# Patient Record
Sex: MALE | Race: WHITE | NOT HISPANIC OR LATINO | Employment: OTHER | URBAN - METROPOLITAN AREA
[De-identification: names, ages, dates, MRNs, and addresses within clinical notes are randomized per-mention and may not be internally consistent; named-entity substitution may affect disease eponyms.]

---

## 2017-01-01 ENCOUNTER — APPOINTMENT (OUTPATIENT)
Dept: RADIOLOGY | Facility: MEDICAL CENTER | Age: 57
DRG: 432 | End: 2017-01-01
Attending: EMERGENCY MEDICINE
Payer: MEDICARE

## 2017-01-01 ENCOUNTER — HOSPITAL ENCOUNTER (EMERGENCY)
Facility: MEDICAL CENTER | Age: 57
End: 2017-01-06
Payer: MEDICARE

## 2017-01-01 ENCOUNTER — APPOINTMENT (OUTPATIENT)
Dept: RADIOLOGY | Facility: MEDICAL CENTER | Age: 57
DRG: 432 | End: 2017-01-01
Attending: INTERNAL MEDICINE
Payer: MEDICARE

## 2017-01-01 ENCOUNTER — APPOINTMENT (OUTPATIENT)
Dept: RADIOLOGY | Facility: MEDICAL CENTER | Age: 57
DRG: 432 | End: 2017-01-01
Attending: HOSPITALIST
Payer: MEDICARE

## 2017-01-01 ENCOUNTER — APPOINTMENT (OUTPATIENT)
Dept: RADIOLOGY | Facility: MEDICAL CENTER | Age: 57
End: 2017-01-01
Attending: EMERGENCY MEDICINE
Payer: MEDICARE

## 2017-01-01 ENCOUNTER — APPOINTMENT (OUTPATIENT)
Dept: RADIOLOGY | Facility: MEDICAL CENTER | Age: 57
End: 2017-01-01
Attending: GENERAL ACUTE CARE HOSPITAL
Payer: MEDICARE

## 2017-01-01 ENCOUNTER — HOSPITAL ENCOUNTER (EMERGENCY)
Facility: MEDICAL CENTER | Age: 57
End: 2017-01-06
Attending: EMERGENCY MEDICINE
Payer: MEDICARE

## 2017-01-01 ENCOUNTER — APPOINTMENT (OUTPATIENT)
Dept: RADIOLOGY | Facility: MEDICAL CENTER | Age: 57
DRG: 432 | End: 2017-01-01
Attending: NEUROLOGICAL SURGERY
Payer: MEDICARE

## 2017-01-01 ENCOUNTER — HOSPITAL ENCOUNTER (EMERGENCY)
Facility: MEDICAL CENTER | Age: 57
End: 2017-06-04
Attending: EMERGENCY MEDICINE
Payer: MEDICARE

## 2017-01-01 ENCOUNTER — HOSPITAL ENCOUNTER (EMERGENCY)
Facility: MEDICAL CENTER | Age: 57
End: 2017-02-08
Attending: EMERGENCY MEDICINE
Payer: MEDICARE

## 2017-01-01 ENCOUNTER — HOSPITAL ENCOUNTER (EMERGENCY)
Facility: MEDICAL CENTER | Age: 57
End: 2017-06-19
Attending: EMERGENCY MEDICINE
Payer: MEDICARE

## 2017-01-01 ENCOUNTER — RESOLUTE PROFESSIONAL BILLING HOSPITAL PROF FEE (OUTPATIENT)
Dept: HOSPITALIST | Facility: MEDICAL CENTER | Age: 57
End: 2017-01-01
Payer: MEDICARE

## 2017-01-01 ENCOUNTER — HOSPITAL ENCOUNTER (INPATIENT)
Facility: MEDICAL CENTER | Age: 57
LOS: 19 days | DRG: 432 | End: 2017-08-05
Attending: EMERGENCY MEDICINE | Admitting: INTERNAL MEDICINE
Payer: MEDICARE

## 2017-01-01 VITALS
DIASTOLIC BLOOD PRESSURE: 52 MMHG | HEART RATE: 55 BPM | SYSTOLIC BLOOD PRESSURE: 92 MMHG | WEIGHT: 238.98 LBS | HEIGHT: 68 IN | BODY MASS INDEX: 36.22 KG/M2 | TEMPERATURE: 97.3 F | OXYGEN SATURATION: 78 %

## 2017-01-01 VITALS
TEMPERATURE: 98.6 F | DIASTOLIC BLOOD PRESSURE: 84 MMHG | RESPIRATION RATE: 20 BRPM | SYSTOLIC BLOOD PRESSURE: 136 MMHG | WEIGHT: 220 LBS | BODY MASS INDEX: 33.34 KG/M2 | HEART RATE: 101 BPM | OXYGEN SATURATION: 93 % | HEIGHT: 68 IN

## 2017-01-01 VITALS
RESPIRATION RATE: 16 BRPM | TEMPERATURE: 98.4 F | SYSTOLIC BLOOD PRESSURE: 112 MMHG | HEIGHT: 67 IN | HEART RATE: 76 BPM | OXYGEN SATURATION: 96 % | DIASTOLIC BLOOD PRESSURE: 58 MMHG | WEIGHT: 220 LBS | BODY MASS INDEX: 34.53 KG/M2

## 2017-01-01 VITALS
BODY MASS INDEX: 35.85 KG/M2 | HEIGHT: 68 IN | TEMPERATURE: 99 F | WEIGHT: 236.55 LBS | OXYGEN SATURATION: 94 % | HEART RATE: 119 BPM | RESPIRATION RATE: 16 BRPM | SYSTOLIC BLOOD PRESSURE: 134 MMHG | DIASTOLIC BLOOD PRESSURE: 86 MMHG

## 2017-01-01 VITALS
RESPIRATION RATE: 18 BRPM | SYSTOLIC BLOOD PRESSURE: 136 MMHG | OXYGEN SATURATION: 94 % | TEMPERATURE: 99.4 F | HEART RATE: 125 BPM | DIASTOLIC BLOOD PRESSURE: 78 MMHG

## 2017-01-01 VITALS
HEART RATE: 125 BPM | RESPIRATION RATE: 18 BRPM | TEMPERATURE: 99.9 F | WEIGHT: 220 LBS | SYSTOLIC BLOOD PRESSURE: 116 MMHG | HEIGHT: 68 IN | BODY MASS INDEX: 33.34 KG/M2 | DIASTOLIC BLOOD PRESSURE: 75 MMHG

## 2017-01-01 DIAGNOSIS — S00.83XA FACIAL CONTUSION, INITIAL ENCOUNTER: ICD-10-CM

## 2017-01-01 DIAGNOSIS — Z00.00 EXAMINATION, MEDICAL, GENERAL: ICD-10-CM

## 2017-01-01 DIAGNOSIS — F10.921 ALCOHOL INTOXICATION, WITH DELIRIUM (HCC): ICD-10-CM

## 2017-01-01 DIAGNOSIS — F10.29 ALCOHOL DEPENDENCE WITH UNSPECIFIED ALCOHOL-INDUCED DISORDER (HCC): ICD-10-CM

## 2017-01-01 DIAGNOSIS — F33.9 RECURRENT MAJOR DEPRESSIVE DISORDER, REMISSION STATUS UNSPECIFIED (HCC): ICD-10-CM

## 2017-01-01 DIAGNOSIS — F10.920 ALCOHOL INTOXICATION, UNCOMPLICATED (HCC): ICD-10-CM

## 2017-01-01 DIAGNOSIS — F20.9 SCHIZOPHRENIA, UNSPECIFIED TYPE (HCC): ICD-10-CM

## 2017-01-01 DIAGNOSIS — K92.2 UGIB (UPPER GASTROINTESTINAL BLEED): ICD-10-CM

## 2017-01-01 DIAGNOSIS — F10.930 ALCOHOL WITHDRAWAL, UNCOMPLICATED (HCC): ICD-10-CM

## 2017-01-01 LAB
ABO GROUP BLD: NORMAL
AFP-TM SERPL-MCNC: 2 NG/ML (ref 0–9)
ALBUMIN SERPL BCP-MCNC: 1.7 G/DL (ref 3.2–4.9)
ALBUMIN SERPL BCP-MCNC: 2 G/DL (ref 3.2–4.9)
ALBUMIN SERPL BCP-MCNC: 2 G/DL (ref 3.2–4.9)
ALBUMIN SERPL BCP-MCNC: 2.1 G/DL (ref 3.2–4.9)
ALBUMIN SERPL BCP-MCNC: 2.2 G/DL (ref 3.2–4.9)
ALBUMIN SERPL BCP-MCNC: 2.4 G/DL (ref 3.2–4.9)
ALBUMIN SERPL BCP-MCNC: 2.5 G/DL (ref 3.2–4.9)
ALBUMIN SERPL BCP-MCNC: 2.5 G/DL (ref 3.2–4.9)
ALBUMIN SERPL BCP-MCNC: 2.6 G/DL (ref 3.2–4.9)
ALBUMIN SERPL BCP-MCNC: 2.6 G/DL (ref 3.2–4.9)
ALBUMIN SERPL BCP-MCNC: 3.5 G/DL (ref 3.2–4.9)
ALBUMIN SERPL BCP-MCNC: 3.6 G/DL (ref 3.2–4.9)
ALBUMIN/GLOB SERPL: 0.8 G/DL
ALBUMIN/GLOB SERPL: 0.9 G/DL
ALBUMIN/GLOB SERPL: 1 G/DL
ALBUMIN/GLOB SERPL: 1.1 G/DL
ALP SERPL-CCNC: 100 U/L (ref 30–99)
ALP SERPL-CCNC: 106 U/L (ref 30–99)
ALP SERPL-CCNC: 43 U/L (ref 30–99)
ALP SERPL-CCNC: 51 U/L (ref 30–99)
ALP SERPL-CCNC: 57 U/L (ref 30–99)
ALP SERPL-CCNC: 61 U/L (ref 30–99)
ALP SERPL-CCNC: 66 U/L (ref 30–99)
ALP SERPL-CCNC: 73 U/L (ref 30–99)
ALP SERPL-CCNC: 75 U/L (ref 30–99)
ALP SERPL-CCNC: 85 U/L (ref 30–99)
ALP SERPL-CCNC: 86 U/L (ref 30–99)
ALP SERPL-CCNC: 91 U/L (ref 30–99)
ALT SERPL-CCNC: 160 U/L (ref 2–50)
ALT SERPL-CCNC: 17 U/L (ref 2–50)
ALT SERPL-CCNC: 18 U/L (ref 2–50)
ALT SERPL-CCNC: 20 U/L (ref 2–50)
ALT SERPL-CCNC: 23 U/L (ref 2–50)
ALT SERPL-CCNC: 28 U/L (ref 2–50)
ALT SERPL-CCNC: 35 U/L (ref 2–50)
ALT SERPL-CCNC: 36 U/L (ref 2–50)
ALT SERPL-CCNC: 48 U/L (ref 2–50)
ALT SERPL-CCNC: 57 U/L (ref 2–50)
ALT SERPL-CCNC: 69 U/L (ref 2–50)
ALT SERPL-CCNC: 84 U/L (ref 2–50)
AMMONIA PLAS-SCNC: 42 UMOL/L (ref 11–45)
AMMONIA PLAS-SCNC: 44 UMOL/L (ref 11–45)
AMMONIA PLAS-SCNC: 45 UMOL/L (ref 11–45)
AMMONIA PLAS-SCNC: 55 UMOL/L (ref 11–45)
AMMONIA PLAS-SCNC: 56 UMOL/L (ref 11–45)
AMMONIA PLAS-SCNC: 68 UMOL/L (ref 11–45)
AMMONIA PLAS-SCNC: 76 UMOL/L (ref 11–45)
AMMONIA PLAS-SCNC: 80 UMOL/L (ref 11–45)
AMMONIA PLAS-SCNC: 81 UMOL/L (ref 11–45)
AMPHET UR QL SCN: NEGATIVE
AMPHET UR QL SCN: NEGATIVE
ANION GAP SERPL CALC-SCNC: 10 MMOL/L (ref 0–11.9)
ANION GAP SERPL CALC-SCNC: 12 MMOL/L (ref 0–11.9)
ANION GAP SERPL CALC-SCNC: 16 MMOL/L (ref 0–11.9)
ANION GAP SERPL CALC-SCNC: 2 MMOL/L (ref 0–11.9)
ANION GAP SERPL CALC-SCNC: 2 MMOL/L (ref 0–11.9)
ANION GAP SERPL CALC-SCNC: 20 MMOL/L (ref 0–11.9)
ANION GAP SERPL CALC-SCNC: 3 MMOL/L (ref 0–11.9)
ANION GAP SERPL CALC-SCNC: 4 MMOL/L (ref 0–11.9)
ANION GAP SERPL CALC-SCNC: 5 MMOL/L (ref 0–11.9)
ANION GAP SERPL CALC-SCNC: 6 MMOL/L (ref 0–11.9)
ANISOCYTOSIS BLD QL SMEAR: ABNORMAL
ANISOCYTOSIS BLD QL SMEAR: NORMAL
APPEARANCE UR: ABNORMAL
APTT PPP: 35.8 SEC (ref 24.7–36)
AST SERPL-CCNC: 106 U/L (ref 12–45)
AST SERPL-CCNC: 114 U/L (ref 12–45)
AST SERPL-CCNC: 33 U/L (ref 12–45)
AST SERPL-CCNC: 350 U/L (ref 12–45)
AST SERPL-CCNC: 36 U/L (ref 12–45)
AST SERPL-CCNC: 36 U/L (ref 12–45)
AST SERPL-CCNC: 37 U/L (ref 12–45)
AST SERPL-CCNC: 39 U/L (ref 12–45)
AST SERPL-CCNC: 48 U/L (ref 12–45)
AST SERPL-CCNC: 48 U/L (ref 12–45)
AST SERPL-CCNC: 73 U/L (ref 12–45)
AST SERPL-CCNC: 95 U/L (ref 12–45)
BACTERIA #/AREA URNS HPF: NEGATIVE /HPF
BACTERIA BLD CULT: NORMAL
BACTERIA BLD CULT: NORMAL
BARBITURATES UR QL SCN: NEGATIVE
BARBITURATES UR QL SCN: NEGATIVE
BARCODED ABORH UBTYP: 5100
BARCODED ABORH UBTYP: 600
BARCODED ABORH UBTYP: 6200
BARCODED ABORH UBTYP: 7300
BARCODED PRD CODE UBPRD: NORMAL
BARCODED UNIT NUM UBUNT: NORMAL
BASE EXCESS BLDA CALC-SCNC: -2 MMOL/L (ref -4–3)
BASE EXCESS BLDA CALC-SCNC: -3 MMOL/L (ref -4–3)
BASOPHILS # BLD AUTO: 0 % (ref 0–1.8)
BASOPHILS # BLD AUTO: 0.2 % (ref 0–1.8)
BASOPHILS # BLD AUTO: 0.2 % (ref 0–1.8)
BASOPHILS # BLD AUTO: 0.3 % (ref 0–1.8)
BASOPHILS # BLD AUTO: 0.5 % (ref 0–1.8)
BASOPHILS # BLD AUTO: 0.6 % (ref 0–1.8)
BASOPHILS # BLD AUTO: 0.6 % (ref 0–1.8)
BASOPHILS # BLD AUTO: 0.7 % (ref 0–1.8)
BASOPHILS # BLD AUTO: 0.9 % (ref 0–1.8)
BASOPHILS # BLD AUTO: 1.1 % (ref 0–1.8)
BASOPHILS # BLD AUTO: 1.4 % (ref 0–1.8)
BASOPHILS # BLD: 0 K/UL (ref 0–0.12)
BASOPHILS # BLD: 0.01 K/UL (ref 0–0.12)
BASOPHILS # BLD: 0.02 K/UL (ref 0–0.12)
BASOPHILS # BLD: 0.03 K/UL (ref 0–0.12)
BASOPHILS # BLD: 0.04 K/UL (ref 0–0.12)
BASOPHILS # BLD: 0.05 K/UL (ref 0–0.12)
BASOPHILS # BLD: 0.05 K/UL (ref 0–0.12)
BASOPHILS # BLD: 0.09 K/UL (ref 0–0.12)
BASOPHILS # BLD: 0.09 K/UL (ref 0–0.12)
BASOPHILS # BLD: 0.1 K/UL (ref 0–0.12)
BASOPHILS # BLD: 0.13 K/UL (ref 0–0.12)
BENZODIAZ UR QL SCN: NEGATIVE
BENZODIAZ UR QL SCN: NEGATIVE
BILIRUB SERPL-MCNC: 1.4 MG/DL (ref 0.1–1.5)
BILIRUB SERPL-MCNC: 1.9 MG/DL (ref 0.1–1.5)
BILIRUB SERPL-MCNC: 1.9 MG/DL (ref 0.1–1.5)
BILIRUB SERPL-MCNC: 2.1 MG/DL (ref 0.1–1.5)
BILIRUB SERPL-MCNC: 2.3 MG/DL (ref 0.1–1.5)
BILIRUB SERPL-MCNC: 2.7 MG/DL (ref 0.1–1.5)
BILIRUB SERPL-MCNC: 2.8 MG/DL (ref 0.1–1.5)
BILIRUB SERPL-MCNC: 2.8 MG/DL (ref 0.1–1.5)
BILIRUB SERPL-MCNC: 3.5 MG/DL (ref 0.1–1.5)
BILIRUB SERPL-MCNC: 3.6 MG/DL (ref 0.1–1.5)
BILIRUB SERPL-MCNC: 5.1 MG/DL (ref 0.1–1.5)
BILIRUB SERPL-MCNC: 6.8 MG/DL (ref 0.1–1.5)
BILIRUB UR QL STRIP.AUTO: ABNORMAL
BLD GP AB SCN SERPL QL: NORMAL
BODY TEMPERATURE: ABNORMAL DEGREES
BUN SERPL-MCNC: 10 MG/DL (ref 8–22)
BUN SERPL-MCNC: 14 MG/DL (ref 8–22)
BUN SERPL-MCNC: 15 MG/DL (ref 8–22)
BUN SERPL-MCNC: 16 MG/DL (ref 8–22)
BUN SERPL-MCNC: 16 MG/DL (ref 8–22)
BUN SERPL-MCNC: 19 MG/DL (ref 8–22)
BUN SERPL-MCNC: 20 MG/DL (ref 8–22)
BUN SERPL-MCNC: 20 MG/DL (ref 8–22)
BUN SERPL-MCNC: 23 MG/DL (ref 8–22)
BUN SERPL-MCNC: 25 MG/DL (ref 8–22)
BUN SERPL-MCNC: 31 MG/DL (ref 8–22)
BUN SERPL-MCNC: 42 MG/DL (ref 8–22)
BUN SERPL-MCNC: 8 MG/DL (ref 8–22)
BUN SERPL-MCNC: 9 MG/DL (ref 8–22)
BURR CELLS BLD QL SMEAR: NORMAL
BZE UR QL SCN: NEGATIVE
BZE UR QL SCN: NEGATIVE
CALCIUM SERPL-MCNC: 7.5 MG/DL (ref 8.5–10.5)
CALCIUM SERPL-MCNC: 7.6 MG/DL (ref 8.5–10.5)
CALCIUM SERPL-MCNC: 7.9 MG/DL (ref 8.5–10.5)
CALCIUM SERPL-MCNC: 8 MG/DL (ref 8.5–10.5)
CALCIUM SERPL-MCNC: 8.1 MG/DL (ref 8.5–10.5)
CALCIUM SERPL-MCNC: 8.2 MG/DL (ref 8.5–10.5)
CALCIUM SERPL-MCNC: 8.2 MG/DL (ref 8.5–10.5)
CALCIUM SERPL-MCNC: 8.3 MG/DL (ref 8.5–10.5)
CALCIUM SERPL-MCNC: 8.4 MG/DL (ref 8.5–10.5)
CALCIUM SERPL-MCNC: 8.6 MG/DL (ref 8.5–10.5)
CALCIUM SERPL-MCNC: 8.8 MG/DL (ref 8.5–10.5)
CALCIUM SERPL-MCNC: 9 MG/DL (ref 8.5–10.5)
CANNABINOIDS UR QL SCN: NEGATIVE
CANNABINOIDS UR QL SCN: NEGATIVE
CFT BLD TEG: 2 MIN (ref 5–10)
CFT BLD TEG: 3.2 MIN (ref 5–10)
CHLORIDE SERPL-SCNC: 106 MMOL/L (ref 96–112)
CHLORIDE SERPL-SCNC: 108 MMOL/L (ref 96–112)
CHLORIDE SERPL-SCNC: 108 MMOL/L (ref 96–112)
CHLORIDE SERPL-SCNC: 109 MMOL/L (ref 96–112)
CHLORIDE SERPL-SCNC: 109 MMOL/L (ref 96–112)
CHLORIDE SERPL-SCNC: 110 MMOL/L (ref 96–112)
CHLORIDE SERPL-SCNC: 111 MMOL/L (ref 96–112)
CHLORIDE SERPL-SCNC: 112 MMOL/L (ref 96–112)
CHLORIDE SERPL-SCNC: 113 MMOL/L (ref 96–112)
CHLORIDE SERPL-SCNC: 114 MMOL/L (ref 96–112)
CHLORIDE SERPL-SCNC: 116 MMOL/L (ref 96–112)
CHLORIDE SERPL-SCNC: 116 MMOL/L (ref 96–112)
CHLORIDE SERPL-SCNC: 117 MMOL/L (ref 96–112)
CHLORIDE SERPL-SCNC: 118 MMOL/L (ref 96–112)
CK SERPL-CCNC: 50 U/L (ref 0–154)
CLOT ANGLE BLD TEG: 70.3 DEGREES (ref 53–72)
CLOT ANGLE BLD TEG: 71 DEGREES (ref 53–72)
CLOT LYSIS 30M P MA LENFR BLD TEG: 0 % (ref 0–8)
CLOT LYSIS 30M P MA LENFR BLD TEG: 0 % (ref 0–8)
CO2 BLDA-SCNC: 22 MMOL/L (ref 20–33)
CO2 BLDA-SCNC: 23 MMOL/L (ref 20–33)
CO2 BLDA-SCNC: 24 MMOL/L (ref 20–33)
CO2 SERPL-SCNC: 12 MMOL/L (ref 20–33)
CO2 SERPL-SCNC: 17 MMOL/L (ref 20–33)
CO2 SERPL-SCNC: 17 MMOL/L (ref 20–33)
CO2 SERPL-SCNC: 18 MMOL/L (ref 20–33)
CO2 SERPL-SCNC: 19 MMOL/L (ref 20–33)
CO2 SERPL-SCNC: 20 MMOL/L (ref 20–33)
CO2 SERPL-SCNC: 22 MMOL/L (ref 20–33)
CO2 SERPL-SCNC: 23 MMOL/L (ref 20–33)
CO2 SERPL-SCNC: 24 MMOL/L (ref 20–33)
CO2 SERPL-SCNC: 25 MMOL/L (ref 20–33)
CO2 SERPL-SCNC: 27 MMOL/L (ref 20–33)
CO2 SERPL-SCNC: 27 MMOL/L (ref 20–33)
CO2 SERPL-SCNC: 30 MMOL/L (ref 20–33)
CO2 SERPL-SCNC: 30 MMOL/L (ref 20–33)
COLOR UR: ABNORMAL
COMMENT 1642: NORMAL
COMPONENT FT 8504FT: NORMAL
COMPONENT P 8504P: NORMAL
COMPONENT R 8504R: NORMAL
CORTIS SERPL-MCNC: 13 UG/DL (ref 0–23)
CREAT SERPL-MCNC: 0.57 MG/DL (ref 0.5–1.4)
CREAT SERPL-MCNC: 0.59 MG/DL (ref 0.5–1.4)
CREAT SERPL-MCNC: 0.65 MG/DL (ref 0.5–1.4)
CREAT SERPL-MCNC: 0.68 MG/DL (ref 0.5–1.4)
CREAT SERPL-MCNC: 0.69 MG/DL (ref 0.5–1.4)
CREAT SERPL-MCNC: 0.69 MG/DL (ref 0.5–1.4)
CREAT SERPL-MCNC: 0.7 MG/DL (ref 0.5–1.4)
CREAT SERPL-MCNC: 0.71 MG/DL (ref 0.5–1.4)
CREAT SERPL-MCNC: 0.72 MG/DL (ref 0.5–1.4)
CREAT SERPL-MCNC: 0.73 MG/DL (ref 0.5–1.4)
CREAT SERPL-MCNC: 0.73 MG/DL (ref 0.5–1.4)
CREAT SERPL-MCNC: 0.74 MG/DL (ref 0.5–1.4)
CREAT SERPL-MCNC: 0.76 MG/DL (ref 0.5–1.4)
CREAT SERPL-MCNC: 0.78 MG/DL (ref 0.5–1.4)
CREAT SERPL-MCNC: 0.84 MG/DL (ref 0.5–1.4)
CREAT SERPL-MCNC: 0.84 MG/DL (ref 0.5–1.4)
CREAT SERPL-MCNC: 0.86 MG/DL (ref 0.5–1.4)
CREAT SERPL-MCNC: 1.05 MG/DL (ref 0.5–1.4)
CREAT SERPL-MCNC: 1.24 MG/DL (ref 0.5–1.4)
CREAT SERPL-MCNC: 1.46 MG/DL (ref 0.5–1.4)
CRP SERPL HS-MCNC: 2.74 MG/DL (ref 0–0.75)
CT.EXTRINSIC BLD ROTEM: 1.6 MIN (ref 1–3)
CT.EXTRINSIC BLD ROTEM: 1.7 MIN (ref 1–3)
DACRYOCYTES BLD QL SMEAR: NORMAL
EKG IMPRESSION: NORMAL
EOSINOPHIL # BLD AUTO: 0 K/UL (ref 0–0.51)
EOSINOPHIL # BLD AUTO: 0.01 K/UL (ref 0–0.51)
EOSINOPHIL # BLD AUTO: 0.05 K/UL (ref 0–0.51)
EOSINOPHIL # BLD AUTO: 0.07 K/UL (ref 0–0.51)
EOSINOPHIL # BLD AUTO: 0.09 K/UL (ref 0–0.51)
EOSINOPHIL # BLD AUTO: 0.12 K/UL (ref 0–0.51)
EOSINOPHIL # BLD AUTO: 0.19 K/UL (ref 0–0.51)
EOSINOPHIL # BLD AUTO: 0.21 K/UL (ref 0–0.51)
EOSINOPHIL # BLD AUTO: 0.27 K/UL (ref 0–0.51)
EOSINOPHIL # BLD AUTO: 0.28 K/UL (ref 0–0.51)
EOSINOPHIL # BLD AUTO: 0.29 K/UL (ref 0–0.51)
EOSINOPHIL # BLD AUTO: 0.37 K/UL (ref 0–0.51)
EOSINOPHIL # BLD AUTO: 0.38 K/UL (ref 0–0.51)
EOSINOPHIL NFR BLD: 0 % (ref 0–6.9)
EOSINOPHIL NFR BLD: 0.1 % (ref 0–6.9)
EOSINOPHIL NFR BLD: 0.6 % (ref 0–6.9)
EOSINOPHIL NFR BLD: 0.9 % (ref 0–6.9)
EOSINOPHIL NFR BLD: 1.8 % (ref 0–6.9)
EOSINOPHIL NFR BLD: 2.3 % (ref 0–6.9)
EOSINOPHIL NFR BLD: 2.6 % (ref 0–6.9)
EOSINOPHIL NFR BLD: 2.7 % (ref 0–6.9)
EOSINOPHIL NFR BLD: 2.8 % (ref 0–6.9)
EOSINOPHIL NFR BLD: 3 % (ref 0–6.9)
EOSINOPHIL NFR BLD: 3.3 % (ref 0–6.9)
EOSINOPHIL NFR BLD: 3.4 % (ref 0–6.9)
EOSINOPHIL NFR BLD: 4.1 % (ref 0–6.9)
EOSINOPHIL NFR BLD: 4.1 % (ref 0–6.9)
EOSINOPHIL NFR BLD: 6.5 % (ref 0–6.9)
EOSINOPHIL NFR BLD: 8 % (ref 0–6.9)
EPI CELLS #/AREA URNS HPF: ABNORMAL /HPF
ERYTHROCYTE [DISTWIDTH] IN BLOOD BY AUTOMATED COUNT: 47.8 FL (ref 35.9–50)
ERYTHROCYTE [DISTWIDTH] IN BLOOD BY AUTOMATED COUNT: 49.2 FL (ref 35.9–50)
ERYTHROCYTE [DISTWIDTH] IN BLOOD BY AUTOMATED COUNT: 49.9 FL (ref 35.9–50)
ERYTHROCYTE [DISTWIDTH] IN BLOOD BY AUTOMATED COUNT: 52.2 FL (ref 35.9–50)
ERYTHROCYTE [DISTWIDTH] IN BLOOD BY AUTOMATED COUNT: 53.1 FL (ref 35.9–50)
ERYTHROCYTE [DISTWIDTH] IN BLOOD BY AUTOMATED COUNT: 53.1 FL (ref 35.9–50)
ERYTHROCYTE [DISTWIDTH] IN BLOOD BY AUTOMATED COUNT: 53.8 FL (ref 35.9–50)
ERYTHROCYTE [DISTWIDTH] IN BLOOD BY AUTOMATED COUNT: 54 FL (ref 35.9–50)
ERYTHROCYTE [DISTWIDTH] IN BLOOD BY AUTOMATED COUNT: 54.1 FL (ref 35.9–50)
ERYTHROCYTE [DISTWIDTH] IN BLOOD BY AUTOMATED COUNT: 59.1 FL (ref 35.9–50)
ERYTHROCYTE [DISTWIDTH] IN BLOOD BY AUTOMATED COUNT: 59.7 FL (ref 35.9–50)
ERYTHROCYTE [DISTWIDTH] IN BLOOD BY AUTOMATED COUNT: 62.2 FL (ref 35.9–50)
ERYTHROCYTE [DISTWIDTH] IN BLOOD BY AUTOMATED COUNT: 63 FL (ref 35.9–50)
ERYTHROCYTE [DISTWIDTH] IN BLOOD BY AUTOMATED COUNT: 63.1 FL (ref 35.9–50)
ERYTHROCYTE [DISTWIDTH] IN BLOOD BY AUTOMATED COUNT: 64.6 FL (ref 35.9–50)
ERYTHROCYTE [DISTWIDTH] IN BLOOD BY AUTOMATED COUNT: 66.2 FL (ref 35.9–50)
ERYTHROCYTE [DISTWIDTH] IN BLOOD BY AUTOMATED COUNT: 67.7 FL (ref 35.9–50)
ERYTHROCYTE [DISTWIDTH] IN BLOOD BY AUTOMATED COUNT: 69.3 FL (ref 35.9–50)
ERYTHROCYTE [DISTWIDTH] IN BLOOD BY AUTOMATED COUNT: 80.3 FL (ref 35.9–50)
ERYTHROCYTE [DISTWIDTH] IN BLOOD BY AUTOMATED COUNT: 90.8 FL (ref 35.9–50)
ERYTHROCYTE [DISTWIDTH] IN BLOOD BY AUTOMATED COUNT: 90.9 FL (ref 35.9–50)
ETHANOL BLD-MCNC: 0.33 G/DL
GFR SERPL CREATININE-BSD FRML MDRD: 50 ML/MIN/1.73 M 2
GFR SERPL CREATININE-BSD FRML MDRD: >60 ML/MIN/1.73 M 2
GLOBULIN SER CALC-MCNC: 1.7 G/DL (ref 1.9–3.5)
GLOBULIN SER CALC-MCNC: 2.1 G/DL (ref 1.9–3.5)
GLOBULIN SER CALC-MCNC: 2.2 G/DL (ref 1.9–3.5)
GLOBULIN SER CALC-MCNC: 2.3 G/DL (ref 1.9–3.5)
GLOBULIN SER CALC-MCNC: 2.4 G/DL (ref 1.9–3.5)
GLOBULIN SER CALC-MCNC: 2.4 G/DL (ref 1.9–3.5)
GLOBULIN SER CALC-MCNC: 2.6 G/DL (ref 1.9–3.5)
GLOBULIN SER CALC-MCNC: 2.8 G/DL (ref 1.9–3.5)
GLOBULIN SER CALC-MCNC: 2.9 G/DL (ref 1.9–3.5)
GLOBULIN SER CALC-MCNC: 2.9 G/DL (ref 1.9–3.5)
GLOBULIN SER CALC-MCNC: 3.7 G/DL (ref 1.9–3.5)
GLOBULIN SER CALC-MCNC: 3.7 G/DL (ref 1.9–3.5)
GLUCOSE BLD-MCNC: 106 MG/DL (ref 65–99)
GLUCOSE BLD-MCNC: 107 MG/DL (ref 65–99)
GLUCOSE BLD-MCNC: 107 MG/DL (ref 65–99)
GLUCOSE BLD-MCNC: 112 MG/DL (ref 65–99)
GLUCOSE BLD-MCNC: 112 MG/DL (ref 65–99)
GLUCOSE BLD-MCNC: 121 MG/DL (ref 65–99)
GLUCOSE BLD-MCNC: 121 MG/DL (ref 65–99)
GLUCOSE BLD-MCNC: 130 MG/DL (ref 65–99)
GLUCOSE BLD-MCNC: 136 MG/DL (ref 65–99)
GLUCOSE BLD-MCNC: 279 MG/DL (ref 65–99)
GLUCOSE BLD-MCNC: 76 MG/DL (ref 65–99)
GLUCOSE BLD-MCNC: 83 MG/DL (ref 65–99)
GLUCOSE BLD-MCNC: 88 MG/DL (ref 65–99)
GLUCOSE SERPL-MCNC: 101 MG/DL (ref 65–99)
GLUCOSE SERPL-MCNC: 102 MG/DL (ref 65–99)
GLUCOSE SERPL-MCNC: 105 MG/DL (ref 65–99)
GLUCOSE SERPL-MCNC: 106 MG/DL (ref 65–99)
GLUCOSE SERPL-MCNC: 109 MG/DL (ref 65–99)
GLUCOSE SERPL-MCNC: 110 MG/DL (ref 65–99)
GLUCOSE SERPL-MCNC: 111 MG/DL (ref 65–99)
GLUCOSE SERPL-MCNC: 116 MG/DL (ref 65–99)
GLUCOSE SERPL-MCNC: 116 MG/DL (ref 65–99)
GLUCOSE SERPL-MCNC: 125 MG/DL (ref 65–99)
GLUCOSE SERPL-MCNC: 135 MG/DL (ref 65–99)
GLUCOSE SERPL-MCNC: 140 MG/DL (ref 65–99)
GLUCOSE SERPL-MCNC: 165 MG/DL (ref 65–99)
GLUCOSE SERPL-MCNC: 64 MG/DL (ref 65–99)
GLUCOSE SERPL-MCNC: 71 MG/DL (ref 65–99)
GLUCOSE SERPL-MCNC: 76 MG/DL (ref 65–99)
GLUCOSE SERPL-MCNC: 78 MG/DL (ref 65–99)
GLUCOSE SERPL-MCNC: 84 MG/DL (ref 65–99)
GLUCOSE SERPL-MCNC: 87 MG/DL (ref 65–99)
GLUCOSE SERPL-MCNC: 92 MG/DL (ref 65–99)
GLUCOSE UR STRIP.AUTO-MCNC: NEGATIVE MG/DL
HCO3 BLDA-SCNC: 21.4 MMOL/L (ref 17–25)
HCO3 BLDA-SCNC: 21.5 MMOL/L (ref 17–25)
HCO3 BLDA-SCNC: 21.6 MMOL/L (ref 17–25)
HCO3 BLDA-SCNC: 21.6 MMOL/L (ref 17–25)
HCO3 BLDA-SCNC: 22.9 MMOL/L (ref 17–25)
HCT VFR BLD AUTO: 21.1 % (ref 42–52)
HCT VFR BLD AUTO: 22.3 % (ref 42–52)
HCT VFR BLD AUTO: 22.5 % (ref 42–52)
HCT VFR BLD AUTO: 22.6 % (ref 42–52)
HCT VFR BLD AUTO: 22.8 % (ref 42–52)
HCT VFR BLD AUTO: 23.5 % (ref 42–52)
HCT VFR BLD AUTO: 23.7 % (ref 42–52)
HCT VFR BLD AUTO: 24.2 % (ref 42–52)
HCT VFR BLD AUTO: 24.3 % (ref 42–52)
HCT VFR BLD AUTO: 24.7 % (ref 42–52)
HCT VFR BLD AUTO: 24.7 % (ref 42–52)
HCT VFR BLD AUTO: 24.8 % (ref 42–52)
HCT VFR BLD AUTO: 24.9 % (ref 42–52)
HCT VFR BLD AUTO: 24.9 % (ref 42–52)
HCT VFR BLD AUTO: 25 % (ref 42–52)
HCT VFR BLD AUTO: 25.1 % (ref 42–52)
HCT VFR BLD AUTO: 25.3 % (ref 42–52)
HCT VFR BLD AUTO: 25.4 % (ref 42–52)
HCT VFR BLD AUTO: 25.8 % (ref 42–52)
HCT VFR BLD AUTO: 26 % (ref 42–52)
HCT VFR BLD AUTO: 26.1 % (ref 42–52)
HCT VFR BLD AUTO: 26.1 % (ref 42–52)
HCT VFR BLD AUTO: 26.7 % (ref 42–52)
HCT VFR BLD AUTO: 26.8 % (ref 42–52)
HCT VFR BLD AUTO: 26.8 % (ref 42–52)
HCT VFR BLD AUTO: 27 % (ref 42–52)
HCT VFR BLD AUTO: 28.9 % (ref 42–52)
HCT VFR BLD AUTO: 38 % (ref 42–52)
HCT VFR BLD AUTO: 38.8 % (ref 42–52)
HGB BLD-MCNC: 12.1 G/DL (ref 14–18)
HGB BLD-MCNC: 12.2 G/DL (ref 14–18)
HGB BLD-MCNC: 6.2 G/DL (ref 14–18)
HGB BLD-MCNC: 6.4 G/DL (ref 14–18)
HGB BLD-MCNC: 6.4 G/DL (ref 14–18)
HGB BLD-MCNC: 6.8 G/DL (ref 14–18)
HGB BLD-MCNC: 6.9 G/DL (ref 14–18)
HGB BLD-MCNC: 7.1 G/DL (ref 14–18)
HGB BLD-MCNC: 7.3 G/DL (ref 14–18)
HGB BLD-MCNC: 7.4 G/DL (ref 14–18)
HGB BLD-MCNC: 7.5 G/DL (ref 14–18)
HGB BLD-MCNC: 7.6 G/DL (ref 14–18)
HGB BLD-MCNC: 7.7 G/DL (ref 14–18)
HGB BLD-MCNC: 7.7 G/DL (ref 14–18)
HGB BLD-MCNC: 7.8 G/DL (ref 14–18)
HGB BLD-MCNC: 7.9 G/DL (ref 14–18)
HGB BLD-MCNC: 8 G/DL (ref 14–18)
HGB BLD-MCNC: 8.1 G/DL (ref 14–18)
HGB BLD-MCNC: 8.1 G/DL (ref 14–18)
HGB BLD-MCNC: 8.2 G/DL (ref 14–18)
HGB BLD-MCNC: 8.3 G/DL (ref 14–18)
HGB BLD-MCNC: 8.4 G/DL (ref 14–18)
HGB BLD-MCNC: 8.5 G/DL (ref 14–18)
HGB BLD-MCNC: 8.5 G/DL (ref 14–18)
HGB BLD-MCNC: 8.6 G/DL (ref 14–18)
HGB BLD-MCNC: 9.4 G/DL (ref 14–18)
HYALINE CASTS #/AREA URNS LPF: ABNORMAL /LPF
IMM GRANULOCYTES # BLD AUTO: 0.02 K/UL (ref 0–0.11)
IMM GRANULOCYTES # BLD AUTO: 0.03 K/UL (ref 0–0.11)
IMM GRANULOCYTES # BLD AUTO: 0.04 K/UL (ref 0–0.11)
IMM GRANULOCYTES # BLD AUTO: 0.08 K/UL (ref 0–0.11)
IMM GRANULOCYTES # BLD AUTO: 0.09 K/UL (ref 0–0.11)
IMM GRANULOCYTES # BLD AUTO: 0.11 K/UL (ref 0–0.11)
IMM GRANULOCYTES # BLD AUTO: 0.23 K/UL (ref 0–0.11)
IMM GRANULOCYTES # BLD AUTO: 0.37 K/UL (ref 0–0.11)
IMM GRANULOCYTES NFR BLD AUTO: 0.3 % (ref 0–0.9)
IMM GRANULOCYTES NFR BLD AUTO: 0.3 % (ref 0–0.9)
IMM GRANULOCYTES NFR BLD AUTO: 0.4 % (ref 0–0.9)
IMM GRANULOCYTES NFR BLD AUTO: 0.5 % (ref 0–0.9)
IMM GRANULOCYTES NFR BLD AUTO: 0.8 % (ref 0–0.9)
IMM GRANULOCYTES NFR BLD AUTO: 0.9 % (ref 0–0.9)
IMM GRANULOCYTES NFR BLD AUTO: 1 % (ref 0–0.9)
IMM GRANULOCYTES NFR BLD AUTO: 1.1 % (ref 0–0.9)
IMM GRANULOCYTES NFR BLD AUTO: 3.4 % (ref 0–0.9)
IMM GRANULOCYTES NFR BLD AUTO: 5.2 % (ref 0–0.9)
INR PPP: 1.37 (ref 0.87–1.13)
INR PPP: 1.52 (ref 0.87–1.13)
INR PPP: 1.55 (ref 0.87–1.13)
INR PPP: 1.61 (ref 0.87–1.13)
INR PPP: 2.65 (ref 0.87–1.13)
IRON SATN MFR SERPL: 63 % (ref 15–55)
IRON SATN MFR SERPL: 9 % (ref 15–55)
IRON SERPL-MCNC: 150 UG/DL (ref 50–180)
IRON SERPL-MCNC: 24 UG/DL (ref 50–180)
KETONES UR STRIP.AUTO-MCNC: ABNORMAL MG/DL
LACTATE BLD-SCNC: 1.1 MMOL/L (ref 0.5–2)
LACTATE BLD-SCNC: 1.4 MMOL/L (ref 0.5–2)
LACTATE BLD-SCNC: 1.4 MMOL/L (ref 0.5–2)
LACTATE BLD-SCNC: 1.7 MMOL/L (ref 0.5–2)
LACTATE BLD-SCNC: 2.2 MMOL/L (ref 0.5–2)
LEUKOCYTE ESTERASE UR QL STRIP.AUTO: ABNORMAL
LG PLATELETS BLD QL SMEAR: NORMAL
LIPASE SERPL-CCNC: 113 U/L (ref 11–82)
LIPASE SERPL-CCNC: 28 U/L (ref 11–82)
LIPASE SERPL-CCNC: 54 U/L (ref 11–82)
LYMPHOCYTES # BLD AUTO: 0 K/UL (ref 1–4.8)
LYMPHOCYTES # BLD AUTO: 0.24 K/UL (ref 1–4.8)
LYMPHOCYTES # BLD AUTO: 0.42 K/UL (ref 1–4.8)
LYMPHOCYTES # BLD AUTO: 0.46 K/UL (ref 1–4.8)
LYMPHOCYTES # BLD AUTO: 0.5 K/UL (ref 1–4.8)
LYMPHOCYTES # BLD AUTO: 0.6 K/UL (ref 1–4.8)
LYMPHOCYTES # BLD AUTO: 0.83 K/UL (ref 1–4.8)
LYMPHOCYTES # BLD AUTO: 1.01 K/UL (ref 1–4.8)
LYMPHOCYTES # BLD AUTO: 1.07 K/UL (ref 1–4.8)
LYMPHOCYTES # BLD AUTO: 1.08 K/UL (ref 1–4.8)
LYMPHOCYTES # BLD AUTO: 1.11 K/UL (ref 1–4.8)
LYMPHOCYTES # BLD AUTO: 1.45 K/UL (ref 1–4.8)
LYMPHOCYTES # BLD AUTO: 1.54 K/UL (ref 1–4.8)
LYMPHOCYTES # BLD AUTO: 1.54 K/UL (ref 1–4.8)
LYMPHOCYTES # BLD AUTO: 1.83 K/UL (ref 1–4.8)
LYMPHOCYTES # BLD AUTO: 2.73 K/UL (ref 1–4.8)
LYMPHOCYTES NFR BLD: 0 % (ref 22–41)
LYMPHOCYTES NFR BLD: 1.7 % (ref 22–41)
LYMPHOCYTES NFR BLD: 10.3 % (ref 22–41)
LYMPHOCYTES NFR BLD: 10.5 % (ref 22–41)
LYMPHOCYTES NFR BLD: 13.5 % (ref 22–41)
LYMPHOCYTES NFR BLD: 13.8 % (ref 22–41)
LYMPHOCYTES NFR BLD: 15 % (ref 22–41)
LYMPHOCYTES NFR BLD: 15.7 % (ref 22–41)
LYMPHOCYTES NFR BLD: 18.3 % (ref 22–41)
LYMPHOCYTES NFR BLD: 19.3 % (ref 22–41)
LYMPHOCYTES NFR BLD: 19.6 % (ref 22–41)
LYMPHOCYTES NFR BLD: 23 % (ref 22–41)
LYMPHOCYTES NFR BLD: 23 % (ref 22–41)
LYMPHOCYTES NFR BLD: 39.5 % (ref 22–41)
LYMPHOCYTES NFR BLD: 7.9 % (ref 22–41)
LYMPHOCYTES NFR BLD: 8.9 % (ref 22–41)
MACROCYTES BLD QL SMEAR: ABNORMAL
MAGNESIUM SERPL-MCNC: 1.4 MG/DL (ref 1.5–2.5)
MAGNESIUM SERPL-MCNC: 1.7 MG/DL (ref 1.5–2.5)
MAGNESIUM SERPL-MCNC: 1.8 MG/DL (ref 1.5–2.5)
MAGNESIUM SERPL-MCNC: 1.9 MG/DL (ref 1.5–2.5)
MAGNESIUM SERPL-MCNC: 1.9 MG/DL (ref 1.5–2.5)
MAGNESIUM SERPL-MCNC: 2 MG/DL (ref 1.5–2.5)
MANUAL DIFF BLD: NORMAL
MCF BLD TEG: 62.2 MM (ref 50–70)
MCF BLD TEG: 63.2 MM (ref 50–70)
MCH RBC QN AUTO: 26 PG (ref 27–33)
MCH RBC QN AUTO: 26.4 PG (ref 27–33)
MCH RBC QN AUTO: 27.5 PG (ref 27–33)
MCH RBC QN AUTO: 27.7 PG (ref 27–33)
MCH RBC QN AUTO: 28.3 PG (ref 27–33)
MCH RBC QN AUTO: 28.4 PG (ref 27–33)
MCH RBC QN AUTO: 28.5 PG (ref 27–33)
MCH RBC QN AUTO: 28.5 PG (ref 27–33)
MCH RBC QN AUTO: 28.6 PG (ref 27–33)
MCH RBC QN AUTO: 28.6 PG (ref 27–33)
MCH RBC QN AUTO: 28.7 PG (ref 27–33)
MCH RBC QN AUTO: 28.8 PG (ref 27–33)
MCH RBC QN AUTO: 28.9 PG (ref 27–33)
MCH RBC QN AUTO: 29.5 PG (ref 27–33)
MCH RBC QN AUTO: 29.5 PG (ref 27–33)
MCH RBC QN AUTO: 29.9 PG (ref 27–33)
MCH RBC QN AUTO: 31 PG (ref 27–33)
MCHC RBC AUTO-ENTMCNC: 29.5 G/DL (ref 33.7–35.3)
MCHC RBC AUTO-ENTMCNC: 30 G/DL (ref 33.7–35.3)
MCHC RBC AUTO-ENTMCNC: 30 G/DL (ref 33.7–35.3)
MCHC RBC AUTO-ENTMCNC: 30.7 G/DL (ref 33.7–35.3)
MCHC RBC AUTO-ENTMCNC: 30.8 G/DL (ref 33.7–35.3)
MCHC RBC AUTO-ENTMCNC: 30.9 G/DL (ref 33.7–35.3)
MCHC RBC AUTO-ENTMCNC: 31.3 G/DL (ref 33.7–35.3)
MCHC RBC AUTO-ENTMCNC: 31.4 G/DL (ref 33.7–35.3)
MCHC RBC AUTO-ENTMCNC: 31.5 G/DL (ref 33.7–35.3)
MCHC RBC AUTO-ENTMCNC: 31.6 G/DL (ref 33.7–35.3)
MCHC RBC AUTO-ENTMCNC: 31.7 G/DL (ref 33.7–35.3)
MCHC RBC AUTO-ENTMCNC: 31.8 G/DL (ref 33.7–35.3)
MCHC RBC AUTO-ENTMCNC: 31.9 G/DL (ref 33.7–35.3)
MCHC RBC AUTO-ENTMCNC: 32 G/DL (ref 33.7–35.3)
MCHC RBC AUTO-ENTMCNC: 32.2 G/DL (ref 33.7–35.3)
MCHC RBC AUTO-ENTMCNC: 32.2 G/DL (ref 33.7–35.3)
MCHC RBC AUTO-ENTMCNC: 32.5 G/DL (ref 33.7–35.3)
MCHC RBC AUTO-ENTMCNC: 32.5 G/DL (ref 33.7–35.3)
MCHC RBC AUTO-ENTMCNC: 32.7 G/DL (ref 33.7–35.3)
MCHC RBC AUTO-ENTMCNC: 32.9 G/DL (ref 33.7–35.3)
MCHC RBC AUTO-ENTMCNC: 33.6 G/DL (ref 33.7–35.3)
MCV RBC AUTO: 82.7 FL (ref 81.4–97.8)
MCV RBC AUTO: 82.8 FL (ref 81.4–97.8)
MCV RBC AUTO: 84.2 FL (ref 81.4–97.8)
MCV RBC AUTO: 85.3 FL (ref 81.4–97.8)
MCV RBC AUTO: 86.5 FL (ref 81.4–97.8)
MCV RBC AUTO: 87.4 FL (ref 81.4–97.8)
MCV RBC AUTO: 87.5 FL (ref 81.4–97.8)
MCV RBC AUTO: 88.4 FL (ref 81.4–97.8)
MCV RBC AUTO: 88.7 FL (ref 81.4–97.8)
MCV RBC AUTO: 89.3 FL (ref 81.4–97.8)
MCV RBC AUTO: 89.4 FL (ref 81.4–97.8)
MCV RBC AUTO: 89.4 FL (ref 81.4–97.8)
MCV RBC AUTO: 89.6 FL (ref 81.4–97.8)
MCV RBC AUTO: 92.1 FL (ref 81.4–97.8)
MCV RBC AUTO: 93.5 FL (ref 81.4–97.8)
MCV RBC AUTO: 95.4 FL (ref 81.4–97.8)
MCV RBC AUTO: 96 FL (ref 81.4–97.8)
MCV RBC AUTO: 96.3 FL (ref 81.4–97.8)
MCV RBC AUTO: 96.9 FL (ref 81.4–97.8)
MCV RBC AUTO: 97.8 FL (ref 81.4–97.8)
MCV RBC AUTO: 98.5 FL (ref 81.4–97.8)
MDMA UR QL SCN: NEGATIVE
MDMA UR QL SCN: NEGATIVE
METAMYELOCYTES NFR BLD MANUAL: 1.8 %
METHADONE UR QL SCN: NEGATIVE
METHADONE UR QL SCN: NEGATIVE
MICRO URNS: ABNORMAL
MICROCYTES BLD QL SMEAR: ABNORMAL
MICROCYTES BLD QL SMEAR: NORMAL
MONOCYTES # BLD AUTO: 0.08 K/UL (ref 0–0.85)
MONOCYTES # BLD AUTO: 0.2 K/UL (ref 0–0.85)
MONOCYTES # BLD AUTO: 0.21 K/UL (ref 0–0.85)
MONOCYTES # BLD AUTO: 0.39 K/UL (ref 0–0.85)
MONOCYTES # BLD AUTO: 0.51 K/UL (ref 0–0.85)
MONOCYTES # BLD AUTO: 0.54 K/UL (ref 0–0.85)
MONOCYTES # BLD AUTO: 0.62 K/UL (ref 0–0.85)
MONOCYTES # BLD AUTO: 0.63 K/UL (ref 0–0.85)
MONOCYTES # BLD AUTO: 0.63 K/UL (ref 0–0.85)
MONOCYTES # BLD AUTO: 0.64 K/UL (ref 0–0.85)
MONOCYTES # BLD AUTO: 0.68 K/UL (ref 0–0.85)
MONOCYTES # BLD AUTO: 0.72 K/UL (ref 0–0.85)
MONOCYTES # BLD AUTO: 0.73 K/UL (ref 0–0.85)
MONOCYTES # BLD AUTO: 0.73 K/UL (ref 0–0.85)
MONOCYTES # BLD AUTO: 0.97 K/UL (ref 0–0.85)
MONOCYTES # BLD AUTO: 1.44 K/UL (ref 0–0.85)
MONOCYTES NFR BLD AUTO: 1.8 % (ref 0–13.4)
MONOCYTES NFR BLD AUTO: 10.5 % (ref 0–13.4)
MONOCYTES NFR BLD AUTO: 10.7 % (ref 0–13.4)
MONOCYTES NFR BLD AUTO: 11.5 % (ref 0–13.4)
MONOCYTES NFR BLD AUTO: 12.2 % (ref 0–13.4)
MONOCYTES NFR BLD AUTO: 14.7 % (ref 0–13.4)
MONOCYTES NFR BLD AUTO: 3.7 % (ref 0–13.4)
MONOCYTES NFR BLD AUTO: 4.4 % (ref 0–13.4)
MONOCYTES NFR BLD AUTO: 4.5 % (ref 0–13.4)
MONOCYTES NFR BLD AUTO: 4.7 % (ref 0–13.4)
MONOCYTES NFR BLD AUTO: 4.8 % (ref 0–13.4)
MONOCYTES NFR BLD AUTO: 5.4 % (ref 0–13.4)
MONOCYTES NFR BLD AUTO: 8.6 % (ref 0–13.4)
MONOCYTES NFR BLD AUTO: 8.7 % (ref 0–13.4)
MONOCYTES NFR BLD AUTO: 8.9 % (ref 0–13.4)
MONOCYTES NFR BLD AUTO: 8.9 % (ref 0–13.4)
MORPHOLOGY BLD-IMP: NORMAL
MUCOUS THREADS #/AREA URNS HPF: ABNORMAL /HPF
MYELOCYTES NFR BLD MANUAL: 0.9 %
NEUTROPHILS # BLD AUTO: 11.61 K/UL (ref 1.82–7.42)
NEUTROPHILS # BLD AUTO: 12.71 K/UL (ref 1.82–7.42)
NEUTROPHILS # BLD AUTO: 2.69 K/UL (ref 1.82–7.42)
NEUTROPHILS # BLD AUTO: 2.8 K/UL (ref 1.82–7.42)
NEUTROPHILS # BLD AUTO: 2.93 K/UL (ref 1.82–7.42)
NEUTROPHILS # BLD AUTO: 3.15 K/UL (ref 1.82–7.42)
NEUTROPHILS # BLD AUTO: 3.53 K/UL (ref 1.82–7.42)
NEUTROPHILS # BLD AUTO: 3.74 K/UL (ref 1.82–7.42)
NEUTROPHILS # BLD AUTO: 30.66 K/UL (ref 1.82–7.42)
NEUTROPHILS # BLD AUTO: 4.38 K/UL (ref 1.82–7.42)
NEUTROPHILS # BLD AUTO: 4.46 K/UL (ref 1.82–7.42)
NEUTROPHILS # BLD AUTO: 4.69 K/UL (ref 1.82–7.42)
NEUTROPHILS # BLD AUTO: 5 K/UL (ref 1.82–7.42)
NEUTROPHILS # BLD AUTO: 5.25 K/UL (ref 1.82–7.42)
NEUTROPHILS # BLD AUTO: 5.83 K/UL (ref 1.82–7.42)
NEUTROPHILS # BLD AUTO: 8.19 K/UL (ref 1.82–7.42)
NEUTROPHILS NFR BLD: 45.6 % (ref 44–72)
NEUTROPHILS NFR BLD: 59.6 % (ref 44–72)
NEUTROPHILS NFR BLD: 62.5 % (ref 44–72)
NEUTROPHILS NFR BLD: 62.7 % (ref 44–72)
NEUTROPHILS NFR BLD: 66.1 % (ref 44–72)
NEUTROPHILS NFR BLD: 66.6 % (ref 44–72)
NEUTROPHILS NFR BLD: 66.8 % (ref 44–72)
NEUTROPHILS NFR BLD: 69.1 % (ref 44–72)
NEUTROPHILS NFR BLD: 75.2 % (ref 44–72)
NEUTROPHILS NFR BLD: 76.7 % (ref 44–72)
NEUTROPHILS NFR BLD: 78.5 % (ref 44–72)
NEUTROPHILS NFR BLD: 79.3 % (ref 44–72)
NEUTROPHILS NFR BLD: 79.5 % (ref 44–72)
NEUTROPHILS NFR BLD: 86.2 % (ref 44–72)
NEUTROPHILS NFR BLD: 88.6 % (ref 44–72)
NEUTROPHILS NFR BLD: 95.5 % (ref 44–72)
NEUTS BAND NFR BLD MANUAL: 0.9 % (ref 0–10)
NEUTS BAND NFR BLD MANUAL: 2.8 % (ref 0–10)
NITRITE UR QL STRIP.AUTO: POSITIVE
NRBC # BLD AUTO: 0 K/UL
NRBC # BLD AUTO: 0.02 K/UL
NRBC # BLD AUTO: 0.03 K/UL
NRBC # BLD AUTO: 0.04 K/UL
NRBC # BLD AUTO: 0.07 K/UL
NRBC # BLD AUTO: 0.12 K/UL
NRBC # BLD AUTO: 0.17 K/UL
NRBC # BLD AUTO: 0.23 K/UL
NRBC # BLD AUTO: 0.43 K/UL
NRBC BLD AUTO-RTO: 0 /100 WBC
NRBC BLD AUTO-RTO: 0.4 /100 WBC
NRBC BLD AUTO-RTO: 0.4 /100 WBC
NRBC BLD AUTO-RTO: 0.6 /100 WBC
NRBC BLD AUTO-RTO: 0.8 /100 WBC
NRBC BLD AUTO-RTO: 1.3 /100 WBC
NRBC BLD AUTO-RTO: 1.6 /100 WBC
NRBC BLD AUTO-RTO: 1.6 /100 WBC
NRBC BLD AUTO-RTO: 1.7 /100 WBC
O2/TOTAL GAS SETTING VFR VENT: 35 %
O2/TOTAL GAS SETTING VFR VENT: 35 %
O2/TOTAL GAS SETTING VFR VENT: 40 %
OPIATES UR QL SCN: NEGATIVE
OPIATES UR QL SCN: NEGATIVE
OVALOCYTES BLD QL SMEAR: NORMAL
OXYCODONE UR QL SCN: NEGATIVE
OXYCODONE UR QL SCN: NEGATIVE
PA AA BLD-ACNC: 100 %
PA AA BLD-ACNC: 72.4 %
PA ADP BLD-ACNC: 46.1 %
PA ADP BLD-ACNC: 99.8 %
PCO2 BLDA: 32.4 MMHG (ref 26–37)
PCO2 BLDA: 33.2 MMHG (ref 26–37)
PCO2 BLDA: 35 MMHG (ref 26–37)
PCO2 BLDA: 36.5 MMHG (ref 26–37)
PCO2 BLDA: 41.1 MMHG (ref 26–37)
PCO2 TEMP ADJ BLDA: 32.4 MMHG (ref 26–37)
PCO2 TEMP ADJ BLDA: 32.7 MMHG (ref 26–37)
PCO2 TEMP ADJ BLDA: 34.9 MMHG (ref 26–37)
PCO2 TEMP ADJ BLDA: 41.1 MMHG (ref 26–37)
PCP UR QL SCN: NEGATIVE
PCP UR QL SCN: NEGATIVE
PH BLDA: 7.35 [PH] (ref 7.4–7.5)
PH BLDA: 7.38 [PH] (ref 7.4–7.5)
PH BLDA: 7.4 [PH] (ref 7.4–7.5)
PH BLDA: 7.42 [PH] (ref 7.4–7.5)
PH BLDA: 7.43 [PH] (ref 7.4–7.5)
PH TEMP ADJ BLDA: 7.35 [PH] (ref 7.4–7.5)
PH TEMP ADJ BLDA: 7.4 [PH] (ref 7.4–7.5)
PH TEMP ADJ BLDA: 7.43 [PH] (ref 7.4–7.5)
PH TEMP ADJ BLDA: 7.43 [PH] (ref 7.4–7.5)
PH UR STRIP.AUTO: 5.5 [PH]
PHOSPHATE SERPL-MCNC: 2.9 MG/DL (ref 2.5–4.5)
PHOSPHATE SERPL-MCNC: 2.9 MG/DL (ref 2.5–4.5)
PHOSPHATE SERPL-MCNC: 3 MG/DL (ref 2.5–4.5)
PHOSPHATE SERPL-MCNC: 3.4 MG/DL (ref 2.5–4.5)
PHOSPHATE SERPL-MCNC: 3.9 MG/DL (ref 2.5–4.5)
PHOSPHATE SERPL-MCNC: 4 MG/DL (ref 2.5–4.5)
PHOSPHATE SERPL-MCNC: 4.5 MG/DL (ref 2.5–4.5)
PLATELET # BLD AUTO: 101 K/UL (ref 164–446)
PLATELET # BLD AUTO: 101 K/UL (ref 164–446)
PLATELET # BLD AUTO: 111 K/UL (ref 164–446)
PLATELET # BLD AUTO: 117 K/UL (ref 164–446)
PLATELET # BLD AUTO: 154 K/UL (ref 164–446)
PLATELET # BLD AUTO: 36 K/UL (ref 164–446)
PLATELET # BLD AUTO: 38 K/UL (ref 164–446)
PLATELET # BLD AUTO: 39 K/UL (ref 164–446)
PLATELET # BLD AUTO: 40 K/UL (ref 164–446)
PLATELET # BLD AUTO: 41 K/UL (ref 164–446)
PLATELET # BLD AUTO: 44 K/UL (ref 164–446)
PLATELET # BLD AUTO: 46 K/UL (ref 164–446)
PLATELET # BLD AUTO: 49 K/UL (ref 164–446)
PLATELET # BLD AUTO: 50 K/UL (ref 164–446)
PLATELET # BLD AUTO: 51 K/UL (ref 164–446)
PLATELET # BLD AUTO: 52 K/UL (ref 164–446)
PLATELET # BLD AUTO: 57 K/UL (ref 164–446)
PLATELET # BLD AUTO: 61 K/UL (ref 164–446)
PLATELET # BLD AUTO: 63 K/UL (ref 164–446)
PLATELET # BLD AUTO: 74 K/UL (ref 164–446)
PLATELET # BLD AUTO: 90 K/UL (ref 164–446)
PLATELET BLD QL SMEAR: NORMAL
PLATELETS.RETICULATED NFR BLD AUTO: 12.2 K/UL (ref 0.6–13.1)
PLATELETS.RETICULATED NFR BLD AUTO: 13.8 K/UL (ref 0.6–13.1)
PLATELETS.RETICULATED NFR BLD AUTO: 14 K/UL (ref 0.6–13.1)
PLATELETS.RETICULATED NFR BLD AUTO: 17.8 K/UL (ref 0.6–13.1)
PMV BLD AUTO: 10.8 FL (ref 9–12.9)
PMV BLD AUTO: 11 FL (ref 9–12.9)
PMV BLD AUTO: 11.2 FL (ref 9–12.9)
PMV BLD AUTO: 11.2 FL (ref 9–12.9)
PMV BLD AUTO: 11.3 FL (ref 9–12.9)
PMV BLD AUTO: 11.6 FL (ref 9–12.9)
PMV BLD AUTO: 11.8 FL (ref 9–12.9)
PMV BLD AUTO: 11.8 FL (ref 9–12.9)
PMV BLD AUTO: 12.2 FL (ref 9–12.9)
PMV BLD AUTO: 12.5 FL (ref 9–12.9)
PMV BLD AUTO: 12.6 FL (ref 9–12.9)
PMV BLD AUTO: 12.6 FL (ref 9–12.9)
PMV BLD AUTO: 12.7 FL (ref 9–12.9)
PMV BLD AUTO: 12.8 FL (ref 9–12.9)
PMV BLD AUTO: 12.9 FL (ref 9–12.9)
PMV BLD AUTO: 13 FL (ref 9–12.9)
PMV BLD AUTO: 13.5 FL (ref 9–12.9)
PO2 BLDA: 55 MMHG (ref 64–87)
PO2 BLDA: 58 MMHG (ref 64–87)
PO2 BLDA: 62 MMHG (ref 64–87)
PO2 BLDA: 66 MMHG (ref 64–87)
PO2 BLDA: 77 MMHG (ref 64–87)
PO2 TEMP ADJ BLDA: 55 MMHG (ref 64–87)
PO2 TEMP ADJ BLDA: 57 MMHG (ref 64–87)
PO2 TEMP ADJ BLDA: 62 MMHG (ref 64–87)
PO2 TEMP ADJ BLDA: 66 MMHG (ref 64–87)
POC BREATHALIZER: 0.02 PERCENT (ref 0–0.01)
POC BREATHALIZER: 0.03 PERCENT (ref 0–0.01)
POC BREATHALIZER: 0.03 PERCENT (ref 0–0.01)
POC BREATHALIZER: 0.15 PERCENT (ref 0–0.01)
POC BREATHALIZER: 0.18 PERCENT (ref 0–0.01)
POC BREATHALIZER: 0.26 PERCENT (ref 0–0.01)
POIKILOCYTOSIS BLD QL SMEAR: NORMAL
POLYCHROMASIA BLD QL SMEAR: NORMAL
POTASSIUM SERPL-SCNC: 3.2 MMOL/L (ref 3.6–5.5)
POTASSIUM SERPL-SCNC: 3.3 MMOL/L (ref 3.6–5.5)
POTASSIUM SERPL-SCNC: 3.3 MMOL/L (ref 3.6–5.5)
POTASSIUM SERPL-SCNC: 3.4 MMOL/L (ref 3.6–5.5)
POTASSIUM SERPL-SCNC: 3.5 MMOL/L (ref 3.6–5.5)
POTASSIUM SERPL-SCNC: 3.6 MMOL/L (ref 3.6–5.5)
POTASSIUM SERPL-SCNC: 3.7 MMOL/L (ref 3.6–5.5)
POTASSIUM SERPL-SCNC: 3.7 MMOL/L (ref 3.6–5.5)
POTASSIUM SERPL-SCNC: 3.8 MMOL/L (ref 3.6–5.5)
POTASSIUM SERPL-SCNC: 4 MMOL/L (ref 3.6–5.5)
POTASSIUM SERPL-SCNC: 4.2 MMOL/L (ref 3.6–5.5)
POTASSIUM SERPL-SCNC: 4.4 MMOL/L (ref 3.6–5.5)
POTASSIUM SERPL-SCNC: 4.4 MMOL/L (ref 3.6–5.5)
POTASSIUM SERPL-SCNC: 4.8 MMOL/L (ref 3.6–5.5)
POTASSIUM SERPL-SCNC: 4.9 MMOL/L (ref 3.6–5.5)
POTASSIUM SERPL-SCNC: 5.2 MMOL/L (ref 3.6–5.5)
PREALB SERPL-MCNC: 4 MG/DL (ref 18–38)
PRODUCT TYPE UPROD: NORMAL
PROPOXYPH UR QL SCN: NEGATIVE
PROPOXYPH UR QL SCN: NEGATIVE
PROT SERPL-MCNC: 3.4 G/DL (ref 6–8.2)
PROT SERPL-MCNC: 4.2 G/DL (ref 6–8.2)
PROT SERPL-MCNC: 4.4 G/DL (ref 6–8.2)
PROT SERPL-MCNC: 4.4 G/DL (ref 6–8.2)
PROT SERPL-MCNC: 4.5 G/DL (ref 6–8.2)
PROT SERPL-MCNC: 4.6 G/DL (ref 6–8.2)
PROT SERPL-MCNC: 5.2 G/DL (ref 6–8.2)
PROT SERPL-MCNC: 5.4 G/DL (ref 6–8.2)
PROT SERPL-MCNC: 7.2 G/DL (ref 6–8.2)
PROT SERPL-MCNC: 7.3 G/DL (ref 6–8.2)
PROT UR QL STRIP: NEGATIVE MG/DL
PROTHROMBIN TIME: 17.3 SEC (ref 12–14.6)
PROTHROMBIN TIME: 18.8 SEC (ref 12–14.6)
PROTHROMBIN TIME: 19.1 SEC (ref 12–14.6)
PROTHROMBIN TIME: 19.6 SEC (ref 12–14.6)
PROTHROMBIN TIME: 29.1 SEC (ref 12–14.6)
RBC # BLD AUTO: 2.46 M/UL (ref 4.7–6.1)
RBC # BLD AUTO: 2.51 M/UL (ref 4.7–6.1)
RBC # BLD AUTO: 2.52 M/UL (ref 4.7–6.1)
RBC # BLD AUTO: 2.61 M/UL (ref 4.7–6.1)
RBC # BLD AUTO: 2.61 M/UL (ref 4.7–6.1)
RBC # BLD AUTO: 2.69 M/UL (ref 4.7–6.1)
RBC # BLD AUTO: 2.71 M/UL (ref 4.7–6.1)
RBC # BLD AUTO: 2.73 M/UL (ref 4.7–6.1)
RBC # BLD AUTO: 2.74 M/UL (ref 4.7–6.1)
RBC # BLD AUTO: 2.74 M/UL (ref 4.7–6.1)
RBC # BLD AUTO: 2.78 M/UL (ref 4.7–6.1)
RBC # BLD AUTO: 2.79 M/UL (ref 4.7–6.1)
RBC # BLD AUTO: 2.88 M/UL (ref 4.7–6.1)
RBC # BLD AUTO: 2.92 M/UL (ref 4.7–6.1)
RBC # BLD AUTO: 2.97 M/UL (ref 4.7–6.1)
RBC # BLD AUTO: 2.97 M/UL (ref 4.7–6.1)
RBC # BLD AUTO: 3 M/UL (ref 4.7–6.1)
RBC # BLD AUTO: 3.02 M/UL (ref 4.7–6.1)
RBC # BLD AUTO: 3.39 M/UL (ref 4.7–6.1)
RBC # BLD AUTO: 4.59 M/UL (ref 4.7–6.1)
RBC # BLD AUTO: 4.69 M/UL (ref 4.7–6.1)
RBC BLD AUTO: PRESENT
RBC UR QL AUTO: NEGATIVE
RH BLD: NORMAL
SAO2 % BLDA: 90 % (ref 93–99)
SAO2 % BLDA: 91 % (ref 93–99)
SAO2 % BLDA: 92 % (ref 93–99)
SAO2 % BLDA: 92 % (ref 93–99)
SAO2 % BLDA: 95 % (ref 93–99)
SCHISTOCYTES BLD QL SMEAR: NORMAL
SCHISTOCYTES BLD QL SMEAR: NORMAL
SIGNIFICANT IND 70042: NORMAL
SIGNIFICANT IND 70042: NORMAL
SITE SITE: NORMAL
SITE SITE: NORMAL
SMUDGE CELLS BLD QL SMEAR: NORMAL
SODIUM SERPL-SCNC: 133 MMOL/L (ref 135–145)
SODIUM SERPL-SCNC: 133 MMOL/L (ref 135–145)
SODIUM SERPL-SCNC: 135 MMOL/L (ref 135–145)
SODIUM SERPL-SCNC: 136 MMOL/L (ref 135–145)
SODIUM SERPL-SCNC: 138 MMOL/L (ref 135–145)
SODIUM SERPL-SCNC: 139 MMOL/L (ref 135–145)
SODIUM SERPL-SCNC: 140 MMOL/L (ref 135–145)
SODIUM SERPL-SCNC: 140 MMOL/L (ref 135–145)
SODIUM SERPL-SCNC: 141 MMOL/L (ref 135–145)
SODIUM SERPL-SCNC: 142 MMOL/L (ref 135–145)
SODIUM SERPL-SCNC: 143 MMOL/L (ref 135–145)
SODIUM SERPL-SCNC: 143 MMOL/L (ref 135–145)
SODIUM SERPL-SCNC: 144 MMOL/L (ref 135–145)
SODIUM SERPL-SCNC: 144 MMOL/L (ref 135–145)
SODIUM SERPL-SCNC: 145 MMOL/L (ref 135–145)
SODIUM SERPL-SCNC: 146 MMOL/L (ref 135–145)
SODIUM SERPL-SCNC: 147 MMOL/L (ref 135–145)
SODIUM SERPL-SCNC: 148 MMOL/L (ref 135–145)
SOURCE SOURCE: NORMAL
SOURCE SOURCE: NORMAL
SP GR UR STRIP.AUTO: 1.02
SPECIMEN DRAWN FROM PATIENT: ABNORMAL
SPERM #/AREA URNS HPF: ABNORMAL /HPF
TEG ALGORITHM TGALG: ABNORMAL
TEG ALGORITHM TGALG: ABNORMAL
TIBC SERPL-MCNC: 238 UG/DL (ref 250–450)
TIBC SERPL-MCNC: 274 UG/DL (ref 250–450)
TRIGL SERPL-MCNC: 137 MG/DL (ref 0–149)
TRIGL SERPL-MCNC: 82 MG/DL (ref 0–149)
TRIGL SERPL-MCNC: 82 MG/DL (ref 0–149)
TSH SERPL DL<=0.005 MIU/L-ACNC: 3.71 UIU/ML (ref 0.3–3.7)
UNIT STATUS USTAT: NORMAL
UROBILINOGEN UR STRIP.AUTO-MCNC: 1 MG/DL
WBC # BLD AUTO: 10.5 K/UL (ref 4.8–10.8)
WBC # BLD AUTO: 11.6 K/UL (ref 4.8–10.8)
WBC # BLD AUTO: 12.4 K/UL (ref 4.8–10.8)
WBC # BLD AUTO: 13.5 K/UL (ref 4.8–10.8)
WBC # BLD AUTO: 14.2 K/UL (ref 4.8–10.8)
WBC # BLD AUTO: 3.2 K/UL (ref 4.8–10.8)
WBC # BLD AUTO: 3.7 K/UL (ref 4.8–10.8)
WBC # BLD AUTO: 32.1 K/UL (ref 4.8–10.8)
WBC # BLD AUTO: 4.3 K/UL (ref 4.8–10.8)
WBC # BLD AUTO: 4.5 K/UL (ref 4.8–10.8)
WBC # BLD AUTO: 4.7 K/UL (ref 4.8–10.8)
WBC # BLD AUTO: 5.7 K/UL (ref 4.8–10.8)
WBC # BLD AUTO: 5.7 K/UL (ref 4.8–10.8)
WBC # BLD AUTO: 6.8 K/UL (ref 4.8–10.8)
WBC # BLD AUTO: 6.9 K/UL (ref 4.8–10.8)
WBC # BLD AUTO: 7.1 K/UL (ref 4.8–10.8)
WBC # BLD AUTO: 7.9 K/UL (ref 4.8–10.8)
WBC # BLD AUTO: 8 K/UL (ref 4.8–10.8)
WBC # BLD AUTO: 8.4 K/UL (ref 4.8–10.8)
WBC #/AREA URNS HPF: ABNORMAL /HPF

## 2017-01-01 PROCEDURE — 700105 HCHG RX REV CODE 258: Performed by: HOSPITALIST

## 2017-01-01 PROCEDURE — 94002 VENT MGMT INPAT INIT DAY: CPT

## 2017-01-01 PROCEDURE — 86850 RBC ANTIBODY SCREEN: CPT

## 2017-01-01 PROCEDURE — 700102 HCHG RX REV CODE 250 W/ 637 OVERRIDE(OP): Performed by: INTERNAL MEDICINE

## 2017-01-01 PROCEDURE — 94667 MNPJ CHEST WALL 1ST: CPT

## 2017-01-01 PROCEDURE — A9270 NON-COVERED ITEM OR SERVICE: HCPCS | Performed by: HOSPITALIST

## 2017-01-01 PROCEDURE — A9270 NON-COVERED ITEM OR SERVICE: HCPCS | Performed by: INTERNAL MEDICINE

## 2017-01-01 PROCEDURE — 94150 VITAL CAPACITY TEST: CPT

## 2017-01-01 PROCEDURE — 80053 COMPREHEN METABOLIC PANEL: CPT

## 2017-01-01 PROCEDURE — 30243R1 TRANSFUSION OF NONAUTOLOGOUS PLATELETS INTO CENTRAL VEIN, PERCUTANEOUS APPROACH: ICD-10-PCS | Performed by: EMERGENCY MEDICINE

## 2017-01-01 PROCEDURE — 700111 HCHG RX REV CODE 636 W/ 250 OVERRIDE (IP): Performed by: INTERNAL MEDICINE

## 2017-01-01 PROCEDURE — 82803 BLOOD GASES ANY COMBINATION: CPT

## 2017-01-01 PROCEDURE — 500066 HCHG BITE BLOCK, ECT: Performed by: INTERNAL MEDICINE

## 2017-01-01 PROCEDURE — 36430 TRANSFUSION BLD/BLD COMPNT: CPT

## 2017-01-01 PROCEDURE — 06L34CZ OCCLUSION OF ESOPHAGEAL VEIN WITH EXTRALUMINAL DEVICE, PERCUTANEOUS ENDOSCOPIC APPROACH: ICD-10-PCS | Performed by: INTERNAL MEDICINE

## 2017-01-01 PROCEDURE — 90791 PSYCH DIAGNOSTIC EVALUATION: CPT

## 2017-01-01 PROCEDURE — 770022 HCHG ROOM/CARE - ICU (200)

## 2017-01-01 PROCEDURE — 71010 DX-CHEST-PORTABLE (1 VIEW): CPT

## 2017-01-01 PROCEDURE — 302970 POC BREATHALIZER: Performed by: EMERGENCY MEDICINE

## 2017-01-01 PROCEDURE — 84100 ASSAY OF PHOSPHORUS: CPT

## 2017-01-01 PROCEDURE — 83550 IRON BINDING TEST: CPT

## 2017-01-01 PROCEDURE — 93010 ELECTROCARDIOGRAM REPORT: CPT | Performed by: INTERNAL MEDICINE

## 2017-01-01 PROCEDURE — 99291 CRITICAL CARE FIRST HOUR: CPT | Performed by: INTERNAL MEDICINE

## 2017-01-01 PROCEDURE — 700111 HCHG RX REV CODE 636 W/ 250 OVERRIDE (IP): Performed by: HOSPITALIST

## 2017-01-01 PROCEDURE — 85027 COMPLETE CBC AUTOMATED: CPT

## 2017-01-01 PROCEDURE — 700111 HCHG RX REV CODE 636 W/ 250 OVERRIDE (IP)

## 2017-01-01 PROCEDURE — B548ZZA ULTRASONOGRAPHY OF SUPERIOR VENA CAVA, GUIDANCE: ICD-10-PCS | Performed by: HOSPITALIST

## 2017-01-01 PROCEDURE — 770001 HCHG ROOM/CARE - MED/SURG/GYN PRIV*

## 2017-01-01 PROCEDURE — 85576 BLOOD PLATELET AGGREGATION: CPT

## 2017-01-01 PROCEDURE — 700101 HCHG RX REV CODE 250: Performed by: HOSPITALIST

## 2017-01-01 PROCEDURE — 93005 ELECTROCARDIOGRAM TRACING: CPT | Performed by: INTERNAL MEDICINE

## 2017-01-01 PROCEDURE — C9113 INJ PANTOPRAZOLE SODIUM, VIA: HCPCS | Performed by: HOSPITALIST

## 2017-01-01 PROCEDURE — 700105 HCHG RX REV CODE 258: Performed by: INTERNAL MEDICINE

## 2017-01-01 PROCEDURE — 99233 SBSQ HOSP IP/OBS HIGH 50: CPT | Performed by: HOSPITALIST

## 2017-01-01 PROCEDURE — 94640 AIRWAY INHALATION TREATMENT: CPT

## 2017-01-01 PROCEDURE — C9113 INJ PANTOPRAZOLE SODIUM, VIA: HCPCS | Performed by: INTERNAL MEDICINE

## 2017-01-01 PROCEDURE — 96366 THER/PROPH/DIAG IV INF ADDON: CPT

## 2017-01-01 PROCEDURE — 84443 ASSAY THYROID STIM HORMONE: CPT

## 2017-01-01 PROCEDURE — 302970 POC BREATHALIZER

## 2017-01-01 PROCEDURE — 502240 HCHG MISC OR SUPPLY RC 0272: Performed by: INTERNAL MEDICINE

## 2017-01-01 PROCEDURE — 700102 HCHG RX REV CODE 250 W/ 637 OVERRIDE(OP): Performed by: HOSPITALIST

## 2017-01-01 PROCEDURE — 700101 HCHG RX REV CODE 250: Performed by: GENERAL ACUTE CARE HOSPITAL

## 2017-01-01 PROCEDURE — 92526 ORAL FUNCTION THERAPY: CPT

## 2017-01-01 PROCEDURE — 82550 ASSAY OF CK (CPK): CPT

## 2017-01-01 PROCEDURE — 700102 HCHG RX REV CODE 250 W/ 637 OVERRIDE(OP): Performed by: STUDENT IN AN ORGANIZED HEALTH CARE EDUCATION/TRAINING PROGRAM

## 2017-01-01 PROCEDURE — 82140 ASSAY OF AMMONIA: CPT

## 2017-01-01 PROCEDURE — 80048 BASIC METABOLIC PNL TOTAL CA: CPT

## 2017-01-01 PROCEDURE — C9113 INJ PANTOPRAZOLE SODIUM, VIA: HCPCS | Performed by: EMERGENCY MEDICINE

## 2017-01-01 PROCEDURE — 85025 COMPLETE CBC W/AUTO DIFF WBC: CPT

## 2017-01-01 PROCEDURE — 83690 ASSAY OF LIPASE: CPT

## 2017-01-01 PROCEDURE — 51798 US URINE CAPACITY MEASURE: CPT

## 2017-01-01 PROCEDURE — 700102 HCHG RX REV CODE 250 W/ 637 OVERRIDE(OP)

## 2017-01-01 PROCEDURE — 96365 THER/PROPH/DIAG IV INF INIT: CPT

## 2017-01-01 PROCEDURE — 74000 DX-ABDOMEN-1 VIEW: CPT

## 2017-01-01 PROCEDURE — A9270 NON-COVERED ITEM OR SERVICE: HCPCS | Performed by: PHARMACIST

## 2017-01-01 PROCEDURE — 82962 GLUCOSE BLOOD TEST: CPT

## 2017-01-01 PROCEDURE — 85055 RETICULATED PLATELET ASSAY: CPT

## 2017-01-01 PROCEDURE — A9270 NON-COVERED ITEM OR SERVICE: HCPCS | Performed by: STUDENT IN AN ORGANIZED HEALTH CARE EDUCATION/TRAINING PROGRAM

## 2017-01-01 PROCEDURE — HZ2ZZZZ DETOXIFICATION SERVICES FOR SUBSTANCE ABUSE TREATMENT: ICD-10-PCS | Performed by: INTERNAL MEDICINE

## 2017-01-01 PROCEDURE — 85730 THROMBOPLASTIN TIME PARTIAL: CPT

## 2017-01-01 PROCEDURE — 700105 HCHG RX REV CODE 258: Performed by: EMERGENCY MEDICINE

## 2017-01-01 PROCEDURE — 0W3P8ZZ CONTROL BLEEDING IN GASTROINTESTINAL TRACT, VIA NATURAL OR ARTIFICIAL OPENING ENDOSCOPIC: ICD-10-PCS | Performed by: INTERNAL MEDICINE

## 2017-01-01 PROCEDURE — 36415 COLL VENOUS BLD VENIPUNCTURE: CPT

## 2017-01-01 PROCEDURE — 700101 HCHG RX REV CODE 250: Performed by: INTERNAL MEDICINE

## 2017-01-01 PROCEDURE — 86140 C-REACTIVE PROTEIN: CPT

## 2017-01-01 PROCEDURE — 93005 ELECTROCARDIOGRAM TRACING: CPT | Performed by: EMERGENCY MEDICINE

## 2017-01-01 PROCEDURE — 94668 MNPJ CHEST WALL SBSQ: CPT

## 2017-01-01 PROCEDURE — 85610 PROTHROMBIN TIME: CPT

## 2017-01-01 PROCEDURE — 86923 COMPATIBILITY TEST ELECTRIC: CPT

## 2017-01-01 PROCEDURE — 700102 HCHG RX REV CODE 250 W/ 637 OVERRIDE(OP): Performed by: PHARMACIST

## 2017-01-01 PROCEDURE — 85018 HEMOGLOBIN: CPT

## 2017-01-01 PROCEDURE — 80307 DRUG TEST PRSMV CHEM ANLYZR: CPT

## 2017-01-01 PROCEDURE — 83735 ASSAY OF MAGNESIUM: CPT

## 2017-01-01 PROCEDURE — 82962 GLUCOSE BLOOD TEST: CPT | Mod: 91

## 2017-01-01 PROCEDURE — 70450 CT HEAD/BRAIN W/O DYE: CPT

## 2017-01-01 PROCEDURE — 83605 ASSAY OF LACTIC ACID: CPT

## 2017-01-01 PROCEDURE — 700111 HCHG RX REV CODE 636 W/ 250 OVERRIDE (IP): Performed by: EMERGENCY MEDICINE

## 2017-01-01 PROCEDURE — G8997 SWALLOW GOAL STATUS: HCPCS | Mod: CJ

## 2017-01-01 PROCEDURE — 99291 CRITICAL CARE FIRST HOUR: CPT | Mod: 25 | Performed by: HOSPITALIST

## 2017-01-01 PROCEDURE — 99285 EMERGENCY DEPT VISIT HI MDM: CPT

## 2017-01-01 PROCEDURE — 02HV33Z INSERTION OF INFUSION DEVICE INTO SUPERIOR VENA CAVA, PERCUTANEOUS APPROACH: ICD-10-PCS | Performed by: INTERNAL MEDICINE

## 2017-01-01 PROCEDURE — C1751 CATH, INF, PER/CENT/MIDLINE: HCPCS

## 2017-01-01 PROCEDURE — 94003 VENT MGMT INPAT SUBQ DAY: CPT

## 2017-01-01 PROCEDURE — 85027 COMPLETE CBC AUTOMATED: CPT | Mod: 91

## 2017-01-01 PROCEDURE — 85055 RETICULATED PLATELET ASSAY: CPT | Mod: 91

## 2017-01-01 PROCEDURE — P9016 RBC LEUKOCYTES REDUCED: HCPCS

## 2017-01-01 PROCEDURE — 86900 BLOOD TYPING SEROLOGIC ABO: CPT

## 2017-01-01 PROCEDURE — 87040 BLOOD CULTURE FOR BACTERIA: CPT

## 2017-01-01 PROCEDURE — 36600 WITHDRAWAL OF ARTERIAL BLOOD: CPT

## 2017-01-01 PROCEDURE — 160002 HCHG RECOVERY MINUTES (STAT): Performed by: INTERNAL MEDICINE

## 2017-01-01 PROCEDURE — 85007 BL SMEAR W/DIFF WBC COUNT: CPT

## 2017-01-01 PROCEDURE — 86901 BLOOD TYPING SEROLOGIC RH(D): CPT

## 2017-01-01 PROCEDURE — 160009 HCHG ANES TIME/MIN: Performed by: INTERNAL MEDICINE

## 2017-01-01 PROCEDURE — 36556 INSERT NON-TUNNEL CV CATH: CPT

## 2017-01-01 PROCEDURE — 160035 HCHG PACU - 1ST 60 MINS PHASE I: Performed by: INTERNAL MEDICINE

## 2017-01-01 PROCEDURE — 96372 THER/PROPH/DIAG INJ SC/IM: CPT | Mod: XU

## 2017-01-01 PROCEDURE — 30243L1 TRANSFUSION OF NONAUTOLOGOUS FRESH PLASMA INTO CENTRAL VEIN, PERCUTANEOUS APPROACH: ICD-10-PCS | Performed by: EMERGENCY MEDICINE

## 2017-01-01 PROCEDURE — 160048 HCHG OR STATISTICAL LEVEL 1-5: Performed by: INTERNAL MEDICINE

## 2017-01-01 PROCEDURE — 770006 HCHG ROOM/CARE - MED/SURG/GYN SEMI*

## 2017-01-01 PROCEDURE — 85018 HEMOGLOBIN: CPT | Mod: 91

## 2017-01-01 PROCEDURE — 81001 URINALYSIS AUTO W/SCOPE: CPT

## 2017-01-01 PROCEDURE — 84134 ASSAY OF PREALBUMIN: CPT

## 2017-01-01 PROCEDURE — 302449 STATCHG TRIAGE ONLY (STATISTIC)

## 2017-01-01 PROCEDURE — 700101 HCHG RX REV CODE 250: Performed by: EMERGENCY MEDICINE

## 2017-01-01 PROCEDURE — 99291 CRITICAL CARE FIRST HOUR: CPT | Performed by: HOSPITALIST

## 2017-01-01 PROCEDURE — 700102 HCHG RX REV CODE 250 W/ 637 OVERRIDE(OP): Performed by: GENERAL ACUTE CARE HOSPITAL

## 2017-01-01 PROCEDURE — 160203 HCHG ENDO MINUTES - 1ST 30 MINS LEVEL 4: Performed by: INTERNAL MEDICINE

## 2017-01-01 PROCEDURE — 83540 ASSAY OF IRON: CPT

## 2017-01-01 PROCEDURE — 85014 HEMATOCRIT: CPT | Mod: 91

## 2017-01-01 PROCEDURE — 700111 HCHG RX REV CODE 636 W/ 250 OVERRIDE (IP): Performed by: PHARMACIST

## 2017-01-01 PROCEDURE — P9034 PLATELETS, PHERESIS: HCPCS

## 2017-01-01 PROCEDURE — 36556 INSERT NON-TUNNEL CV CATH: CPT | Performed by: HOSPITALIST

## 2017-01-01 PROCEDURE — 160208 HCHG ENDO MINUTES - EA ADDL 1 MIN LEVEL 4: Performed by: INTERNAL MEDICINE

## 2017-01-01 PROCEDURE — 99152 MOD SED SAME PHYS/QHP 5/>YRS: CPT | Performed by: INTERNAL MEDICINE

## 2017-01-01 PROCEDURE — 96367 TX/PROPH/DG ADDL SEQ IV INF: CPT

## 2017-01-01 PROCEDURE — 99152 MOD SED SAME PHYS/QHP 5/>YRS: CPT

## 2017-01-01 PROCEDURE — 302255 BARRIER CREAM MOISTURE BAZA PROTECT (ZINC) 5OZ: Performed by: HOSPITALIST

## 2017-01-01 PROCEDURE — 71010 DX-CHEST-LIMITED (1 VIEW): CPT

## 2017-01-01 PROCEDURE — 700105 HCHG RX REV CODE 258

## 2017-01-01 PROCEDURE — 99153 MOD SED SAME PHYS/QHP EA: CPT | Performed by: INTERNAL MEDICINE

## 2017-01-01 PROCEDURE — 84478 ASSAY OF TRIGLYCERIDES: CPT

## 2017-01-01 PROCEDURE — 85014 HEMATOCRIT: CPT

## 2017-01-01 PROCEDURE — 85384 FIBRINOGEN ACTIVITY: CPT

## 2017-01-01 PROCEDURE — 99291 CRITICAL CARE FIRST HOUR: CPT

## 2017-01-01 PROCEDURE — 94760 N-INVAS EAR/PLS OXIMETRY 1: CPT

## 2017-01-01 PROCEDURE — 302255 BARRIER CREAM MOISTURE BAZA PROTECT (ZINC) 5OZ: Performed by: INTERNAL MEDICINE

## 2017-01-01 PROCEDURE — C1751 CATH, INF, PER/CENT/MIDLINE: HCPCS | Performed by: EMERGENCY MEDICINE

## 2017-01-01 PROCEDURE — A9270 NON-COVERED ITEM OR SERVICE: HCPCS

## 2017-01-01 PROCEDURE — 82533 TOTAL CORTISOL: CPT

## 2017-01-01 PROCEDURE — A9270 NON-COVERED ITEM OR SERVICE: HCPCS | Performed by: GENERAL ACUTE CARE HOSPITAL

## 2017-01-01 PROCEDURE — 700111 HCHG RX REV CODE 636 W/ 250 OVERRIDE (IP): Performed by: GENERAL ACUTE CARE HOSPITAL

## 2017-01-01 PROCEDURE — 5A1945Z RESPIRATORY VENTILATION, 24-96 CONSECUTIVE HOURS: ICD-10-PCS | Performed by: EMERGENCY MEDICINE

## 2017-01-01 PROCEDURE — 99153 MOD SED SAME PHYS/QHP EA: CPT

## 2017-01-01 PROCEDURE — 96361 HYDRATE IV INFUSION ADD-ON: CPT

## 2017-01-01 PROCEDURE — P9034 PLATELETS, PHERESIS: HCPCS | Mod: 91

## 2017-01-01 PROCEDURE — 02HV33Z INSERTION OF INFUSION DEVICE INTO SUPERIOR VENA CAVA, PERCUTANEOUS APPROACH: ICD-10-PCS | Performed by: HOSPITALIST

## 2017-01-01 PROCEDURE — 302136 NUTRITION PUMP: Performed by: INTERNAL MEDICINE

## 2017-01-01 PROCEDURE — 82105 ALPHA-FETOPROTEIN SERUM: CPT

## 2017-01-01 PROCEDURE — P9017 PLASMA 1 DONOR FRZ W/IN 8 HR: HCPCS

## 2017-01-01 PROCEDURE — 30243N1 TRANSFUSION OF NONAUTOLOGOUS RED BLOOD CELLS INTO CENTRAL VEIN, PERCUTANEOUS APPROACH: ICD-10-PCS | Performed by: EMERGENCY MEDICINE

## 2017-01-01 PROCEDURE — 99291 CRITICAL CARE FIRST HOUR: CPT | Mod: AI,25 | Performed by: INTERNAL MEDICINE

## 2017-01-01 PROCEDURE — G8996 SWALLOW CURRENT STATUS: HCPCS | Mod: CK

## 2017-01-01 PROCEDURE — 99232 SBSQ HOSP IP/OBS MODERATE 35: CPT | Performed by: HOSPITALIST

## 2017-01-01 PROCEDURE — 76705 ECHO EXAM OF ABDOMEN: CPT

## 2017-01-01 PROCEDURE — 85347 COAGULATION TIME ACTIVATED: CPT

## 2017-01-01 PROCEDURE — 36556 INSERT NON-TUNNEL CV CATH: CPT | Performed by: INTERNAL MEDICINE

## 2017-01-01 PROCEDURE — 96368 THER/DIAG CONCURRENT INF: CPT

## 2017-01-01 PROCEDURE — 700101 HCHG RX REV CODE 250

## 2017-01-01 PROCEDURE — P9016 RBC LEUKOCYTES REDUCED: HCPCS | Mod: 91

## 2017-01-01 PROCEDURE — 93005 ELECTROCARDIOGRAM TRACING: CPT | Performed by: GENERAL ACUTE CARE HOSPITAL

## 2017-01-01 PROCEDURE — 700105 HCHG RX REV CODE 258: Performed by: GENERAL ACUTE CARE HOSPITAL

## 2017-01-01 PROCEDURE — 92610 EVALUATE SWALLOWING FUNCTION: CPT

## 2017-01-01 PROCEDURE — 96375 TX/PRO/DX INJ NEW DRUG ADDON: CPT

## 2017-01-01 PROCEDURE — 3E0G8GC INTRODUCTION OF OTHER THERAPEUTIC SUBSTANCE INTO UPPER GI, VIA NATURAL OR ARTIFICIAL OPENING ENDOSCOPIC: ICD-10-PCS | Performed by: INTERNAL MEDICINE

## 2017-01-01 PROCEDURE — 96374 THER/PROPH/DIAG INJ IV PUSH: CPT

## 2017-01-01 RX ORDER — SODIUM CHLORIDE 9 MG/ML
30 INJECTION, SOLUTION INTRAVENOUS
Status: DISCONTINUED | OUTPATIENT
Start: 2017-01-01 | End: 2017-01-01

## 2017-01-01 RX ORDER — POTASSIUM CHLORIDE 14.9 MG/ML
20 INJECTION INTRAVENOUS ONCE
Status: COMPLETED | OUTPATIENT
Start: 2017-01-01 | End: 2017-01-01

## 2017-01-01 RX ORDER — POTASSIUM CHLORIDE 7.45 MG/ML
10 INJECTION INTRAVENOUS ONCE
Status: CANCELLED | OUTPATIENT
Start: 2017-01-01 | End: 2017-01-01

## 2017-01-01 RX ORDER — DIPHENHYDRAMINE HCL 25 MG
25 TABLET ORAL ONCE
Status: COMPLETED | OUTPATIENT
Start: 2017-01-01 | End: 2017-01-01

## 2017-01-01 RX ORDER — SPIRONOLACTONE 50 MG/1
50 TABLET, FILM COATED ORAL
Status: DISCONTINUED | OUTPATIENT
Start: 2017-01-01 | End: 2017-01-01

## 2017-01-01 RX ORDER — LORAZEPAM 2 MG/ML
0.5 INJECTION INTRAMUSCULAR EVERY 4 HOURS PRN
Status: DISCONTINUED | OUTPATIENT
Start: 2017-01-01 | End: 2017-01-01

## 2017-01-01 RX ORDER — LORAZEPAM 2 MG/ML
1-2 INJECTION INTRAMUSCULAR
Status: DISCONTINUED | OUTPATIENT
Start: 2017-01-01 | End: 2017-01-01 | Stop reason: HOSPADM

## 2017-01-01 RX ORDER — DIPHENHYDRAMINE HYDROCHLORIDE 50 MG/ML
25 INJECTION INTRAMUSCULAR; INTRAVENOUS ONCE
Status: COMPLETED | OUTPATIENT
Start: 2017-01-01 | End: 2017-01-01

## 2017-01-01 RX ORDER — THIAMINE MONONITRATE (VIT B1) 100 MG
100 TABLET ORAL ONCE
Status: COMPLETED | OUTPATIENT
Start: 2017-01-01 | End: 2017-01-01

## 2017-01-01 RX ORDER — IPRATROPIUM BROMIDE AND ALBUTEROL SULFATE 2.5; .5 MG/3ML; MG/3ML
3 SOLUTION RESPIRATORY (INHALATION)
Status: DISCONTINUED | OUTPATIENT
Start: 2017-01-01 | End: 2017-01-01

## 2017-01-01 RX ORDER — FOLIC ACID 1 MG/1
1 TABLET ORAL DAILY
Status: DISCONTINUED | OUTPATIENT
Start: 2017-01-01 | End: 2017-01-01 | Stop reason: HOSPADM

## 2017-01-01 RX ORDER — MAGNESIUM SULFATE HEPTAHYDRATE 40 MG/ML
4 INJECTION, SOLUTION INTRAVENOUS ONCE
Status: COMPLETED | OUTPATIENT
Start: 2017-01-01 | End: 2017-01-01

## 2017-01-01 RX ORDER — FOLIC ACID 1 MG/1
1 TABLET ORAL DAILY
Status: DISCONTINUED | OUTPATIENT
Start: 2017-01-01 | End: 2017-01-01 | Stop reason: CLARIF

## 2017-01-01 RX ORDER — AMOXICILLIN 250 MG
2 CAPSULE ORAL 2 TIMES DAILY
Status: DISCONTINUED | OUTPATIENT
Start: 2017-01-01 | End: 2017-01-01

## 2017-01-01 RX ORDER — SODIUM CHLORIDE 9 MG/ML
500 INJECTION, SOLUTION INTRAVENOUS ONCE
Status: COMPLETED | OUTPATIENT
Start: 2017-01-01 | End: 2017-01-01

## 2017-01-01 RX ORDER — PROMETHAZINE HYDROCHLORIDE 25 MG/1
12.5-25 SUPPOSITORY RECTAL EVERY 4 HOURS PRN
Status: DISCONTINUED | OUTPATIENT
Start: 2017-01-01 | End: 2017-01-01 | Stop reason: HOSPADM

## 2017-01-01 RX ORDER — SODIUM CHLORIDE, SODIUM LACTATE, POTASSIUM CHLORIDE, CALCIUM CHLORIDE 600; 310; 30; 20 MG/100ML; MG/100ML; MG/100ML; MG/100ML
INJECTION, SOLUTION INTRAVENOUS CONTINUOUS
Status: DISCONTINUED | OUTPATIENT
Start: 2017-01-01 | End: 2017-01-01

## 2017-01-01 RX ORDER — HALOPERIDOL 5 MG/ML
5 INJECTION INTRAMUSCULAR EVERY 6 HOURS PRN
Status: DISCONTINUED | OUTPATIENT
Start: 2017-01-01 | End: 2017-01-01 | Stop reason: HOSPADM

## 2017-01-01 RX ORDER — LORAZEPAM 1 MG/1
0.5 TABLET ORAL EVERY 4 HOURS PRN
Status: DISCONTINUED | OUTPATIENT
Start: 2017-01-01 | End: 2017-01-01

## 2017-01-01 RX ORDER — MIDAZOLAM HYDROCHLORIDE 1 MG/ML
.5-2 INJECTION INTRAMUSCULAR; INTRAVENOUS PRN
Status: DISCONTINUED | OUTPATIENT
Start: 2017-01-01 | End: 2017-01-01

## 2017-01-01 RX ORDER — POTASSIUM CHLORIDE 20 MEQ/1
40 TABLET, EXTENDED RELEASE ORAL ONCE
Status: DISCONTINUED | OUTPATIENT
Start: 2017-01-01 | End: 2017-01-01 | Stop reason: CLARIF

## 2017-01-01 RX ORDER — LORAZEPAM 2 MG/ML
1 INJECTION INTRAMUSCULAR PRN
Status: DISCONTINUED | OUTPATIENT
Start: 2017-01-01 | End: 2017-01-01 | Stop reason: HOSPADM

## 2017-01-01 RX ORDER — CHLORDIAZEPOXIDE HYDROCHLORIDE 25 MG/1
25 CAPSULE, GELATIN COATED ORAL NIGHTLY
Status: DISCONTINUED | OUTPATIENT
Start: 2017-01-01 | End: 2017-01-01

## 2017-01-01 RX ORDER — LACTULOSE 20 G/30ML
30 SOLUTION ORAL 3 TIMES DAILY
Status: DISCONTINUED | OUTPATIENT
Start: 2017-01-01 | End: 2017-01-01

## 2017-01-01 RX ORDER — POTASSIUM CHLORIDE 7.45 MG/ML
10 INJECTION INTRAVENOUS
Status: COMPLETED | OUTPATIENT
Start: 2017-01-01 | End: 2017-01-01

## 2017-01-01 RX ORDER — PREDNISONE 20 MG/1
40 TABLET ORAL DAILY
Status: DISCONTINUED | OUTPATIENT
Start: 2017-01-01 | End: 2017-01-01 | Stop reason: HOSPADM

## 2017-01-01 RX ORDER — HALOPERIDOL 5 MG/ML
5 INJECTION INTRAMUSCULAR ONCE
Status: DISCONTINUED | OUTPATIENT
Start: 2017-01-01 | End: 2017-01-01

## 2017-01-01 RX ORDER — POTASSIUM CHLORIDE 20 MEQ/1
40 TABLET, EXTENDED RELEASE ORAL ONCE
Status: COMPLETED | OUTPATIENT
Start: 2017-01-01 | End: 2017-01-01

## 2017-01-01 RX ORDER — MIDAZOLAM HYDROCHLORIDE 1 MG/ML
INJECTION INTRAMUSCULAR; INTRAVENOUS
Status: COMPLETED
Start: 2017-01-01 | End: 2017-01-01

## 2017-01-01 RX ORDER — CHLORHEXIDINE GLUCONATE ORAL RINSE 1.2 MG/ML
15 SOLUTION DENTAL 2 TIMES DAILY
Status: DISCONTINUED | OUTPATIENT
Start: 2017-01-01 | End: 2017-01-01 | Stop reason: ALTCHOICE

## 2017-01-01 RX ORDER — POTASSIUM CHLORIDE 20 MEQ/1
20 TABLET, EXTENDED RELEASE ORAL ONCE
Status: COMPLETED | OUTPATIENT
Start: 2017-01-01 | End: 2017-01-01

## 2017-01-01 RX ORDER — POTASSIUM CHLORIDE 20 MEQ/1
20 TABLET, EXTENDED RELEASE ORAL 3 TIMES DAILY
Status: COMPLETED | OUTPATIENT
Start: 2017-01-01 | End: 2017-01-01

## 2017-01-01 RX ORDER — FUROSEMIDE 10 MG/ML
40 INJECTION INTRAMUSCULAR; INTRAVENOUS 3 TIMES DAILY
Status: DISCONTINUED | OUTPATIENT
Start: 2017-01-01 | End: 2017-01-01

## 2017-01-01 RX ORDER — SODIUM CHLORIDE 9 MG/ML
1000 INJECTION, SOLUTION INTRAVENOUS
Status: DISCONTINUED | OUTPATIENT
Start: 2017-01-01 | End: 2017-01-01

## 2017-01-01 RX ORDER — LORAZEPAM 2 MG/ML
1 INJECTION INTRAMUSCULAR
Status: DISCONTINUED | OUTPATIENT
Start: 2017-01-01 | End: 2017-01-01

## 2017-01-01 RX ORDER — CHLORDIAZEPOXIDE HYDROCHLORIDE 25 MG/1
25 CAPSULE, GELATIN COATED ORAL 2 TIMES DAILY
Status: DISCONTINUED | OUTPATIENT
Start: 2017-01-01 | End: 2017-01-01

## 2017-01-01 RX ORDER — ALBUTEROL SULFATE 90 UG/1
2 AEROSOL, METERED RESPIRATORY (INHALATION) EVERY 6 HOURS PRN
Qty: 1 INHALER | Refills: 0 | Status: SHIPPED | OUTPATIENT
Start: 2017-01-01 | End: 2017-01-01

## 2017-01-01 RX ORDER — OMEPRAZOLE 20 MG/1
20 CAPSULE, DELAYED RELEASE ORAL
Status: DISCONTINUED | OUTPATIENT
Start: 2017-01-01 | End: 2017-01-01

## 2017-01-01 RX ORDER — MAGNESIUM SULFATE HEPTAHYDRATE 40 MG/ML
2 INJECTION, SOLUTION INTRAVENOUS ONCE
Status: COMPLETED | OUTPATIENT
Start: 2017-01-01 | End: 2017-01-01

## 2017-01-01 RX ORDER — MIDAZOLAM HYDROCHLORIDE 1 MG/ML
INJECTION INTRAMUSCULAR; INTRAVENOUS
Status: DISCONTINUED | OUTPATIENT
Start: 2017-01-01 | End: 2017-01-01 | Stop reason: HOSPADM

## 2017-01-01 RX ORDER — FUROSEMIDE 40 MG/1
40 TABLET ORAL
Status: DISCONTINUED | OUTPATIENT
Start: 2017-01-01 | End: 2017-01-01

## 2017-01-01 RX ORDER — BISACODYL 10 MG
10 SUPPOSITORY, RECTAL RECTAL
Status: DISCONTINUED | OUTPATIENT
Start: 2017-01-01 | End: 2017-01-01

## 2017-01-01 RX ORDER — ONDANSETRON 4 MG/1
4 TABLET, ORALLY DISINTEGRATING ORAL EVERY 4 HOURS PRN
Status: DISCONTINUED | OUTPATIENT
Start: 2017-01-01 | End: 2017-01-01 | Stop reason: HOSPADM

## 2017-01-01 RX ORDER — POLYETHYLENE GLYCOL 3350 17 G/17G
1 POWDER, FOR SOLUTION ORAL
Status: DISCONTINUED | OUTPATIENT
Start: 2017-01-01 | End: 2017-01-01

## 2017-01-01 RX ORDER — SODIUM CHLORIDE 9 MG/ML
2000 INJECTION, SOLUTION INTRAVENOUS ONCE
Status: COMPLETED | OUTPATIENT
Start: 2017-01-01 | End: 2017-01-01

## 2017-01-01 RX ORDER — ESOMEPRAZOLE SODIUM 40 MG/5ML
40 INJECTION INTRAVENOUS DAILY
Status: DISCONTINUED | OUTPATIENT
Start: 2017-01-01 | End: 2017-01-01

## 2017-01-01 RX ORDER — LORAZEPAM 2 MG/ML
1.5 INJECTION INTRAMUSCULAR
Status: DISCONTINUED | OUTPATIENT
Start: 2017-01-01 | End: 2017-01-01

## 2017-01-01 RX ORDER — SODIUM CHLORIDE AND POTASSIUM CHLORIDE 150; 900 MG/100ML; MG/100ML
INJECTION, SOLUTION INTRAVENOUS CONTINUOUS
Status: DISCONTINUED | OUTPATIENT
Start: 2017-01-01 | End: 2017-01-01

## 2017-01-01 RX ORDER — PROMETHAZINE HYDROCHLORIDE 25 MG/1
12.5-25 TABLET ORAL EVERY 4 HOURS PRN
Status: DISCONTINUED | OUTPATIENT
Start: 2017-01-01 | End: 2017-01-01 | Stop reason: HOSPADM

## 2017-01-01 RX ORDER — THIAMINE MONONITRATE (VIT B1) 100 MG
100 TABLET ORAL DAILY
Status: DISCONTINUED | OUTPATIENT
Start: 2017-01-01 | End: 2017-01-01 | Stop reason: HOSPADM

## 2017-01-01 RX ORDER — MIDAZOLAM HYDROCHLORIDE 1 MG/ML
2 INJECTION INTRAMUSCULAR; INTRAVENOUS ONCE
Status: COMPLETED | OUTPATIENT
Start: 2017-01-01 | End: 2017-01-01

## 2017-01-01 RX ORDER — OMEPRAZOLE 20 MG/1
40 CAPSULE, DELAYED RELEASE ORAL 2 TIMES DAILY
Status: DISCONTINUED | OUTPATIENT
Start: 2017-01-01 | End: 2017-01-01

## 2017-01-01 RX ORDER — LORAZEPAM 1 MG/1
2 TABLET ORAL
Status: DISCONTINUED | OUTPATIENT
Start: 2017-01-01 | End: 2017-01-01

## 2017-01-01 RX ORDER — LORAZEPAM 1 MG/1
1 TABLET ORAL EVERY 4 HOURS PRN
Status: DISCONTINUED | OUTPATIENT
Start: 2017-01-01 | End: 2017-01-01

## 2017-01-01 RX ORDER — GABAPENTIN 100 MG/1
100 CAPSULE ORAL 3 TIMES DAILY
Status: DISCONTINUED | OUTPATIENT
Start: 2017-01-01 | End: 2017-01-01

## 2017-01-01 RX ORDER — LORAZEPAM 1 MG/1
3 TABLET ORAL
Status: DISCONTINUED | OUTPATIENT
Start: 2017-01-01 | End: 2017-01-01

## 2017-01-01 RX ORDER — ONDANSETRON 2 MG/ML
4 INJECTION INTRAMUSCULAR; INTRAVENOUS EVERY 4 HOURS PRN
Status: DISCONTINUED | OUTPATIENT
Start: 2017-01-01 | End: 2017-01-01 | Stop reason: HOSPADM

## 2017-01-01 RX ORDER — FOLIC ACID 1 MG/1
1 TABLET ORAL ONCE
Status: COMPLETED | OUTPATIENT
Start: 2017-01-01 | End: 2017-01-01

## 2017-01-01 RX ORDER — IPRATROPIUM BROMIDE AND ALBUTEROL SULFATE 2.5; .5 MG/3ML; MG/3ML
3 SOLUTION RESPIRATORY (INHALATION)
Status: DISCONTINUED | OUTPATIENT
Start: 2017-01-01 | End: 2017-01-01 | Stop reason: HOSPADM

## 2017-01-01 RX ORDER — LORAZEPAM 2 MG/ML
2 INJECTION INTRAMUSCULAR
Status: DISCONTINUED | OUTPATIENT
Start: 2017-01-01 | End: 2017-01-01

## 2017-01-01 RX ORDER — ALBUTEROL SULFATE 90 UG/1
AEROSOL, METERED RESPIRATORY (INHALATION)
Status: COMPLETED
Start: 2017-01-01 | End: 2017-01-01

## 2017-01-01 RX ORDER — THIAMINE MONONITRATE (VIT B1) 100 MG
100 TABLET ORAL DAILY
Status: DISCONTINUED | OUTPATIENT
Start: 2017-01-01 | End: 2017-01-01 | Stop reason: CLARIF

## 2017-01-01 RX ORDER — SODIUM CHLORIDE 9 MG/ML
500 INJECTION, SOLUTION INTRAVENOUS
Status: ACTIVE | OUTPATIENT
Start: 2017-01-01 | End: 2017-01-01

## 2017-01-01 RX ORDER — ACETAMINOPHEN 325 MG/1
650 TABLET ORAL EVERY 4 HOURS PRN
Status: DISCONTINUED | OUTPATIENT
Start: 2017-01-01 | End: 2017-01-01 | Stop reason: HOSPADM

## 2017-01-01 RX ORDER — MIDAZOLAM HYDROCHLORIDE 1 MG/ML
1 INJECTION INTRAMUSCULAR; INTRAVENOUS ONCE
Status: DISCONTINUED | OUTPATIENT
Start: 2017-01-01 | End: 2017-01-01 | Stop reason: CLARIF

## 2017-01-01 RX ORDER — CHLORDIAZEPOXIDE HYDROCHLORIDE 25 MG/1
25 CAPSULE, GELATIN COATED ORAL EVERY 8 HOURS
Status: DISCONTINUED | OUTPATIENT
Start: 2017-01-01 | End: 2017-01-01

## 2017-01-01 RX ORDER — KETAMINE HYDROCHLORIDE 50 MG/ML
40 INJECTION, SOLUTION INTRAMUSCULAR; INTRAVENOUS ONCE
Status: COMPLETED | OUTPATIENT
Start: 2017-01-01 | End: 2017-01-01

## 2017-01-01 RX ORDER — SUCRALFATE ORAL 1 G/10ML
1 SUSPENSION ORAL EVERY 6 HOURS
Status: DISCONTINUED | OUTPATIENT
Start: 2017-01-01 | End: 2017-01-01

## 2017-01-01 RX ORDER — LIDOCAINE HYDROCHLORIDE 10 MG/ML
1-2 INJECTION, SOLUTION INFILTRATION; PERINEURAL
Status: DISCONTINUED | OUTPATIENT
Start: 2017-01-01 | End: 2017-01-01

## 2017-01-01 RX ORDER — ACETAMINOPHEN 325 MG/1
650 TABLET ORAL ONCE
Status: DISCONTINUED | OUTPATIENT
Start: 2017-01-01 | End: 2017-01-01

## 2017-01-01 RX ORDER — LORAZEPAM 1 MG/1
4 TABLET ORAL
Status: DISCONTINUED | OUTPATIENT
Start: 2017-01-01 | End: 2017-01-01

## 2017-01-01 RX ORDER — LORAZEPAM 2 MG/ML
1 INJECTION INTRAMUSCULAR
Status: DISCONTINUED | OUTPATIENT
Start: 2017-01-01 | End: 2017-01-01 | Stop reason: HOSPADM

## 2017-01-01 RX ORDER — FUROSEMIDE 10 MG/ML
40 INJECTION INTRAMUSCULAR; INTRAVENOUS
Status: DISCONTINUED | OUTPATIENT
Start: 2017-01-01 | End: 2017-01-01

## 2017-01-01 RX ORDER — PROPRANOLOL HYDROCHLORIDE 10 MG/1
10 TABLET ORAL EVERY 8 HOURS
Status: DISCONTINUED | OUTPATIENT
Start: 2017-01-01 | End: 2017-01-01 | Stop reason: ALTCHOICE

## 2017-01-01 RX ORDER — OCTREOTIDE ACETATE 50 UG/ML
50 INJECTION, SOLUTION INTRAVENOUS; SUBCUTANEOUS ONCE
Status: COMPLETED | OUTPATIENT
Start: 2017-01-01 | End: 2017-01-01

## 2017-01-01 RX ORDER — ONDANSETRON 2 MG/ML
8 INJECTION INTRAMUSCULAR; INTRAVENOUS ONCE
Status: COMPLETED | OUTPATIENT
Start: 2017-01-01 | End: 2017-01-01

## 2017-01-01 RX ORDER — ACETAMINOPHEN 650 MG/1
650 SUPPOSITORY RECTAL ONCE
Status: COMPLETED | OUTPATIENT
Start: 2017-01-01 | End: 2017-01-01

## 2017-01-01 RX ORDER — DEXTROSE MONOHYDRATE 25 G/50ML
25 INJECTION, SOLUTION INTRAVENOUS
Status: DISCONTINUED | OUTPATIENT
Start: 2017-01-01 | End: 2017-01-01 | Stop reason: HOSPADM

## 2017-01-01 RX ADMIN — SUCRALFATE 1 G: 1 SUSPENSION ORAL at 05:07

## 2017-01-01 RX ADMIN — SUCRALFATE 1 G: 1 SUSPENSION ORAL at 17:41

## 2017-01-01 RX ADMIN — LACTULOSE 30 ML: 10 SOLUTION ORAL at 15:01

## 2017-01-01 RX ADMIN — LORAZEPAM 1.5 MG: 2 INJECTION INTRAMUSCULAR; INTRAVENOUS at 18:00

## 2017-01-01 RX ADMIN — ACETAMINOPHEN 650 MG: 325 TABLET, FILM COATED ORAL at 04:07

## 2017-01-01 RX ADMIN — PROPRANOLOL HYDROCHLORIDE 10 MG: 10 TABLET ORAL at 17:19

## 2017-01-01 RX ADMIN — POTASSIUM CHLORIDE 20 MEQ: 1500 TABLET, EXTENDED RELEASE ORAL at 14:19

## 2017-01-01 RX ADMIN — OMEPRAZOLE 40 MG: 20 CAPSULE, DELAYED RELEASE ORAL at 21:39

## 2017-01-01 RX ADMIN — SUCRALFATE 1 G: 1 SUSPENSION ORAL at 05:55

## 2017-01-01 RX ADMIN — IPRATROPIUM BROMIDE AND ALBUTEROL SULFATE 3 ML: .5; 3 SOLUTION RESPIRATORY (INHALATION) at 02:45

## 2017-01-01 RX ADMIN — LACTULOSE 30 ML: 20 SOLUTION ORAL at 09:00

## 2017-01-01 RX ADMIN — MIDAZOLAM HYDROCHLORIDE 2 MG: 1 INJECTION INTRAMUSCULAR; INTRAVENOUS at 15:00

## 2017-01-01 RX ADMIN — SUCRALFATE 1 G: 1 SUSPENSION ORAL at 18:18

## 2017-01-01 RX ADMIN — Medication 100 MG: at 11:22

## 2017-01-01 RX ADMIN — LORAZEPAM 1.5 MG: 2 INJECTION INTRAMUSCULAR; INTRAVENOUS at 00:28

## 2017-01-01 RX ADMIN — LORAZEPAM 2 MG: 1 TABLET ORAL at 03:03

## 2017-01-01 RX ADMIN — POTASSIUM CHLORIDE 40 MEQ: 1500 TABLET, EXTENDED RELEASE ORAL at 18:41

## 2017-01-01 RX ADMIN — IRON DEXTRAN 25 MG: 50 INJECTION INTRAMUSCULAR; INTRAVENOUS at 12:55

## 2017-01-01 RX ADMIN — LORAZEPAM 1.5 MG: 2 INJECTION INTRAMUSCULAR; INTRAVENOUS at 21:56

## 2017-01-01 RX ADMIN — RIFAXIMIN 550 MG: 550 TABLET ORAL at 21:37

## 2017-01-01 RX ADMIN — SUCRALFATE 1 G: 1 SUSPENSION ORAL at 05:34

## 2017-01-01 RX ADMIN — LORAZEPAM 1.5 MG: 2 INJECTION INTRAMUSCULAR; INTRAVENOUS at 09:32

## 2017-01-01 RX ADMIN — PROPRANOLOL HYDROCHLORIDE 10 MG: 10 TABLET ORAL at 17:22

## 2017-01-01 RX ADMIN — STANDARDIZED SENNA CONCENTRATE AND DOCUSATE SODIUM 2 TABLET: 8.6; 5 TABLET, FILM COATED ORAL at 10:24

## 2017-01-01 RX ADMIN — SPIRONOLACTONE 50 MG: 50 TABLET ORAL at 05:29

## 2017-01-01 RX ADMIN — FUROSEMIDE 40 MG: 10 INJECTION, SOLUTION INTRAMUSCULAR; INTRAVENOUS at 14:19

## 2017-01-01 RX ADMIN — RIFAXIMIN 550 MG: 550 TABLET ORAL at 08:33

## 2017-01-01 RX ADMIN — MAGNESIUM SULFATE IN WATER 2 G: 40 INJECTION, SOLUTION INTRAVENOUS at 10:00

## 2017-01-01 RX ADMIN — CEFTRIAXONE 2 G: 2 INJECTION, POWDER, FOR SOLUTION INTRAMUSCULAR; INTRAVENOUS at 08:32

## 2017-01-01 RX ADMIN — LORAZEPAM 1 MG: 2 INJECTION INTRAMUSCULAR; INTRAVENOUS at 12:46

## 2017-01-01 RX ADMIN — RIFAXIMIN 550 MG: 550 TABLET ORAL at 21:39

## 2017-01-01 RX ADMIN — GABAPENTIN 100 MG: 100 CAPSULE ORAL at 20:34

## 2017-01-01 RX ADMIN — SODIUM CHLORIDE 80 MG: 9 INJECTION, SOLUTION INTRAVENOUS at 14:47

## 2017-01-01 RX ADMIN — RIFAXIMIN 550 MG: 550 TABLET ORAL at 21:10

## 2017-01-01 RX ADMIN — STANDARDIZED SENNA CONCENTRATE AND DOCUSATE SODIUM 2 TABLET: 8.6; 5 TABLET, FILM COATED ORAL at 20:02

## 2017-01-01 RX ADMIN — SUCRALFATE 1 G: 1 SUSPENSION ORAL at 11:11

## 2017-01-01 RX ADMIN — THERA TABS 1 TABLET: TAB at 09:00

## 2017-01-01 RX ADMIN — CEFTRIAXONE 2 G: 2 INJECTION, POWDER, FOR SOLUTION INTRAMUSCULAR; INTRAVENOUS at 08:59

## 2017-01-01 RX ADMIN — LACTULOSE 30 ML: 20 SOLUTION ORAL at 14:02

## 2017-01-01 RX ADMIN — IPRATROPIUM BROMIDE AND ALBUTEROL SULFATE 3 ML: .5; 3 SOLUTION RESPIRATORY (INHALATION) at 22:31

## 2017-01-01 RX ADMIN — SUCRALFATE 1 G: 1 SUSPENSION ORAL at 17:11

## 2017-01-01 RX ADMIN — LACTULOSE 30 ML: 10 SOLUTION ORAL at 09:20

## 2017-01-01 RX ADMIN — GABAPENTIN 100 MG: 100 CAPSULE ORAL at 15:44

## 2017-01-01 RX ADMIN — PROPRANOLOL HYDROCHLORIDE 10 MG: 10 TABLET ORAL at 17:55

## 2017-01-01 RX ADMIN — LORAZEPAM 1 MG: 1 TABLET ORAL at 04:22

## 2017-01-01 RX ADMIN — IPRATROPIUM BROMIDE AND ALBUTEROL SULFATE 3 ML: .5; 3 SOLUTION RESPIRATORY (INHALATION) at 22:33

## 2017-01-01 RX ADMIN — SODIUM CHLORIDE 8 MG/HR: 9 INJECTION, SOLUTION INTRAVENOUS at 17:25

## 2017-01-01 RX ADMIN — SPIRONOLACTONE 50 MG: 50 TABLET ORAL at 14:13

## 2017-01-01 RX ADMIN — IPRATROPIUM BROMIDE AND ALBUTEROL SULFATE 3 ML: .5; 3 SOLUTION RESPIRATORY (INHALATION) at 14:14

## 2017-01-01 RX ADMIN — GABAPENTIN 100 MG: 100 CAPSULE ORAL at 21:07

## 2017-01-01 RX ADMIN — SODIUM CHLORIDE 8 MG/HR: 9 INJECTION, SOLUTION INTRAVENOUS at 22:28

## 2017-01-01 RX ADMIN — SUCRALFATE 1 G: 1 SUSPENSION ORAL at 17:09

## 2017-01-01 RX ADMIN — OCTREOTIDE ACETATE 50 MCG/HR: 200 INJECTION, SOLUTION INTRAVENOUS; SUBCUTANEOUS at 14:47

## 2017-01-01 RX ADMIN — SODIUM CHLORIDE 8 MG/HR: 9 INJECTION, SOLUTION INTRAVENOUS at 07:14

## 2017-01-01 RX ADMIN — MAGNESIUM SULFATE IN WATER 4 G: 40 INJECTION, SOLUTION INTRAVENOUS at 11:09

## 2017-01-01 RX ADMIN — OCTREOTIDE ACETATE 50 MCG/HR: 200 INJECTION, SOLUTION INTRAVENOUS; SUBCUTANEOUS at 18:09

## 2017-01-01 RX ADMIN — PROPRANOLOL HYDROCHLORIDE 10 MG: 10 TABLET ORAL at 08:07

## 2017-01-01 RX ADMIN — RIFAXIMIN 550 MG: 550 TABLET ORAL at 08:00

## 2017-01-01 RX ADMIN — OCTREOTIDE ACETATE 50 MCG/HR: 200 INJECTION, SOLUTION INTRAVENOUS; SUBCUTANEOUS at 05:01

## 2017-01-01 RX ADMIN — LACTULOSE 30 ML: 20 SOLUTION ORAL at 20:11

## 2017-01-01 RX ADMIN — GABAPENTIN 100 MG: 100 CAPSULE ORAL at 20:11

## 2017-01-01 RX ADMIN — LACTULOSE 30 ML: 20 SOLUTION ORAL at 09:24

## 2017-01-01 RX ADMIN — THERA TABS 1 TABLET: TAB at 11:22

## 2017-01-01 RX ADMIN — LACTULOSE 30 ML: 10 SOLUTION ORAL at 14:42

## 2017-01-01 RX ADMIN — LORAZEPAM 1.5 MG: 2 INJECTION INTRAMUSCULAR; INTRAVENOUS at 14:02

## 2017-01-01 RX ADMIN — ESOMEPRAZOLE SODIUM 8 MG/HR: 40 INJECTION INTRAVENOUS at 12:08

## 2017-01-01 RX ADMIN — LORAZEPAM 3 MG: 1 TABLET ORAL at 00:31

## 2017-01-01 RX ADMIN — LORAZEPAM 2 MG: 2 INJECTION INTRAMUSCULAR; INTRAVENOUS at 21:09

## 2017-01-01 RX ADMIN — POTASSIUM CHLORIDE 10 MEQ: 7.46 INJECTION, SOLUTION INTRAVENOUS at 13:46

## 2017-01-01 RX ADMIN — FOLIC ACID 1 MG: 1 TABLET ORAL at 08:20

## 2017-01-01 RX ADMIN — LORAZEPAM 1 MG: 1 TABLET ORAL at 22:19

## 2017-01-01 RX ADMIN — SUCRALFATE 1 G: 1 SUSPENSION ORAL at 04:41

## 2017-01-01 RX ADMIN — LORAZEPAM 1.5 MG: 2 INJECTION INTRAMUSCULAR; INTRAVENOUS at 22:21

## 2017-01-01 RX ADMIN — LACTULOSE 30 ML: 20 SOLUTION ORAL at 09:45

## 2017-01-01 RX ADMIN — PROPRANOLOL HYDROCHLORIDE 10 MG: 10 TABLET ORAL at 17:09

## 2017-01-01 RX ADMIN — NOREPINEPHRINE BITARTRATE 13 MCG/MIN: 1 INJECTION INTRAVENOUS at 13:56

## 2017-01-01 RX ADMIN — CEFTRIAXONE SODIUM 2 G: 2 INJECTION, POWDER, FOR SOLUTION INTRAMUSCULAR; INTRAVENOUS at 07:45

## 2017-01-01 RX ADMIN — CEFTRIAXONE SODIUM 2 G: 2 INJECTION, POWDER, FOR SOLUTION INTRAMUSCULAR; INTRAVENOUS at 08:34

## 2017-01-01 RX ADMIN — SODIUM CHLORIDE, POTASSIUM CHLORIDE, SODIUM LACTATE AND CALCIUM CHLORIDE: 600; 310; 30; 20 INJECTION, SOLUTION INTRAVENOUS at 20:21

## 2017-01-01 RX ADMIN — OMEPRAZOLE 20 MG: 20 CAPSULE, DELAYED RELEASE ORAL at 20:14

## 2017-01-01 RX ADMIN — SUCRALFATE 1 G: 1 SUSPENSION ORAL at 00:33

## 2017-01-01 RX ADMIN — IPRATROPIUM BROMIDE AND ALBUTEROL SULFATE 3 ML: .5; 3 SOLUTION RESPIRATORY (INHALATION) at 18:51

## 2017-01-01 RX ADMIN — LORAZEPAM 1.5 MG: 2 INJECTION INTRAMUSCULAR; INTRAVENOUS at 07:08

## 2017-01-01 RX ADMIN — CEFTRIAXONE SODIUM 2 G: 2 INJECTION, POWDER, FOR SOLUTION INTRAMUSCULAR; INTRAVENOUS at 08:20

## 2017-01-01 RX ADMIN — PROPRANOLOL HYDROCHLORIDE 10 MG: 10 TABLET ORAL at 00:32

## 2017-01-01 RX ADMIN — LORAZEPAM 1.5 MG: 2 INJECTION INTRAMUSCULAR; INTRAVENOUS at 06:20

## 2017-01-01 RX ADMIN — GABAPENTIN 100 MG: 100 CAPSULE ORAL at 15:14

## 2017-01-01 RX ADMIN — LORAZEPAM 1 MG: 1 TABLET ORAL at 07:08

## 2017-01-01 RX ADMIN — IPRATROPIUM BROMIDE AND ALBUTEROL SULFATE 3 ML: .5; 3 SOLUTION RESPIRATORY (INHALATION) at 06:57

## 2017-01-01 RX ADMIN — SODIUM CHLORIDE 8 MG/HR: 9 INJECTION, SOLUTION INTRAVENOUS at 10:16

## 2017-01-01 RX ADMIN — LORAZEPAM 2 MG: 2 INJECTION INTRAMUSCULAR; INTRAVENOUS at 22:33

## 2017-01-01 RX ADMIN — SUCRALFATE 1 G: 1 SUSPENSION ORAL at 12:05

## 2017-01-01 RX ADMIN — CHLORDIAZEPOXIDE HYDROCHLORIDE 25 MG: 25 CAPSULE ORAL at 14:02

## 2017-01-01 RX ADMIN — LORAZEPAM 1.5 MG: 2 INJECTION INTRAMUSCULAR; INTRAVENOUS at 01:32

## 2017-01-01 RX ADMIN — IPRATROPIUM BROMIDE AND ALBUTEROL SULFATE 3 ML: .5; 3 SOLUTION RESPIRATORY (INHALATION) at 10:52

## 2017-01-01 RX ADMIN — IPRATROPIUM BROMIDE AND ALBUTEROL SULFATE 3 ML: .5; 3 SOLUTION RESPIRATORY (INHALATION) at 06:58

## 2017-01-01 RX ADMIN — LORAZEPAM 1.5 MG: 2 INJECTION INTRAMUSCULAR; INTRAVENOUS at 20:11

## 2017-01-01 RX ADMIN — LORAZEPAM 1.5 MG: 2 INJECTION INTRAMUSCULAR; INTRAVENOUS at 23:31

## 2017-01-01 RX ADMIN — OCTREOTIDE ACETATE 50 MCG/HR: 200 INJECTION, SOLUTION INTRAVENOUS; SUBCUTANEOUS at 14:19

## 2017-01-01 RX ADMIN — IPRATROPIUM BROMIDE AND ALBUTEROL SULFATE 3 ML: .5; 3 SOLUTION RESPIRATORY (INHALATION) at 22:50

## 2017-01-01 RX ADMIN — LACTULOSE 30 ML: 10 SOLUTION ORAL at 08:00

## 2017-01-01 RX ADMIN — LACTULOSE 30 ML: 20 SOLUTION ORAL at 20:34

## 2017-01-01 RX ADMIN — LORAZEPAM 1 MG: 2 INJECTION INTRAMUSCULAR; INTRAVENOUS at 11:23

## 2017-01-01 RX ADMIN — RIFAXIMIN 550 MG: 550 TABLET ORAL at 20:32

## 2017-01-01 RX ADMIN — PROPRANOLOL HYDROCHLORIDE 10 MG: 10 TABLET ORAL at 23:34

## 2017-01-01 RX ADMIN — FOLIC ACID: 5 INJECTION, SOLUTION INTRAMUSCULAR; INTRAVENOUS; SUBCUTANEOUS at 15:00

## 2017-01-01 RX ADMIN — FUROSEMIDE 40 MG: 10 INJECTION, SOLUTION INTRAMUSCULAR; INTRAVENOUS at 05:55

## 2017-01-01 RX ADMIN — SUCRALFATE 1 G: 1 SUSPENSION ORAL at 11:18

## 2017-01-01 RX ADMIN — IPRATROPIUM BROMIDE AND ALBUTEROL SULFATE 3 ML: .5; 3 SOLUTION RESPIRATORY (INHALATION) at 22:54

## 2017-01-01 RX ADMIN — GABAPENTIN 100 MG: 100 CAPSULE ORAL at 08:00

## 2017-01-01 RX ADMIN — RIFAXIMIN 550 MG: 550 TABLET ORAL at 20:00

## 2017-01-01 RX ADMIN — LACTULOSE 30 ML: 10 SOLUTION ORAL at 08:39

## 2017-01-01 RX ADMIN — RIFAXIMIN 550 MG: 550 TABLET ORAL at 19:42

## 2017-01-01 RX ADMIN — RIFAXIMIN 550 MG: 550 TABLET ORAL at 09:20

## 2017-01-01 RX ADMIN — CHLORHEXIDINE GLUCONATE 15 ML: 1.2 RINSE ORAL at 07:51

## 2017-01-01 RX ADMIN — OMEPRAZOLE 20 MG: 20 CAPSULE, DELAYED RELEASE ORAL at 20:17

## 2017-01-01 RX ADMIN — SUCRALFATE 1 G: 1 SUSPENSION ORAL at 19:43

## 2017-01-01 RX ADMIN — RIFAXIMIN 550 MG: 550 TABLET ORAL at 20:15

## 2017-01-01 RX ADMIN — ACETAMINOPHEN 650 MG: 650 SUPPOSITORY RECTAL at 12:21

## 2017-01-01 RX ADMIN — SUCRALFATE 1 G: 1 SUSPENSION ORAL at 01:13

## 2017-01-01 RX ADMIN — IPRATROPIUM BROMIDE AND ALBUTEROL SULFATE 3 ML: .5; 3 SOLUTION RESPIRATORY (INHALATION) at 02:43

## 2017-01-01 RX ADMIN — POTASSIUM CHLORIDE 20 MEQ: 1500 TABLET, EXTENDED RELEASE ORAL at 21:02

## 2017-01-01 RX ADMIN — GABAPENTIN 100 MG: 100 CAPSULE ORAL at 20:14

## 2017-01-01 RX ADMIN — LACTULOSE 30 ML: 10 SOLUTION ORAL at 15:52

## 2017-01-01 RX ADMIN — GABAPENTIN 100 MG: 100 CAPSULE ORAL at 08:06

## 2017-01-01 RX ADMIN — LORAZEPAM 2 MG: 2 INJECTION INTRAMUSCULAR; INTRAVENOUS at 03:52

## 2017-01-01 RX ADMIN — IPRATROPIUM BROMIDE AND ALBUTEROL SULFATE 3 ML: .5; 3 SOLUTION RESPIRATORY (INHALATION) at 14:45

## 2017-01-01 RX ADMIN — PROPOFOL 80 MG: 10 INJECTION, EMULSION INTRAVENOUS at 15:15

## 2017-01-01 RX ADMIN — CHLORDIAZEPOXIDE HYDROCHLORIDE 25 MG: 25 CAPSULE ORAL at 09:24

## 2017-01-01 RX ADMIN — SUCRALFATE 1 G: 1 SUSPENSION ORAL at 05:06

## 2017-01-01 RX ADMIN — SODIUM CHLORIDE 8 MG/HR: 9 INJECTION, SOLUTION INTRAVENOUS at 04:33

## 2017-01-01 RX ADMIN — CEFTRIAXONE SODIUM 2 G: 2 INJECTION, POWDER, FOR SOLUTION INTRAMUSCULAR; INTRAVENOUS at 08:08

## 2017-01-01 RX ADMIN — LORAZEPAM 1 MG: 1 TABLET ORAL at 21:05

## 2017-01-01 RX ADMIN — LORAZEPAM 2 MG: 2 INJECTION INTRAMUSCULAR; INTRAVENOUS at 02:25

## 2017-01-01 RX ADMIN — LACTULOSE 30 ML: 20 SOLUTION ORAL at 08:06

## 2017-01-01 RX ADMIN — PROPOFOL 20 MCG/KG/MIN: 10 INJECTION, EMULSION INTRAVENOUS at 01:45

## 2017-01-01 RX ADMIN — IRON DEXTRAN 1925 MG: 50 INJECTION INTRAMUSCULAR; INTRAVENOUS at 15:28

## 2017-01-01 RX ADMIN — LORAZEPAM 1 MG: 1 TABLET ORAL at 08:44

## 2017-01-01 RX ADMIN — FUROSEMIDE 40 MG: 10 INJECTION, SOLUTION INTRAMUSCULAR; INTRAVENOUS at 15:20

## 2017-01-01 RX ADMIN — IPRATROPIUM BROMIDE AND ALBUTEROL SULFATE 3 ML: .5; 3 SOLUTION RESPIRATORY (INHALATION) at 18:29

## 2017-01-01 RX ADMIN — LORAZEPAM 2 MG: 2 INJECTION INTRAMUSCULAR; INTRAVENOUS at 15:14

## 2017-01-01 RX ADMIN — LACTULOSE 30 ML: 20 SOLUTION ORAL at 15:44

## 2017-01-01 RX ADMIN — HALOPERIDOL LACTATE 5 MG: 5 INJECTION, SOLUTION INTRAMUSCULAR at 20:34

## 2017-01-01 RX ADMIN — LACTULOSE 30 ML: 10 SOLUTION ORAL at 21:37

## 2017-01-01 RX ADMIN — SUCRALFATE 1 G: 1 SUSPENSION ORAL at 06:22

## 2017-01-01 RX ADMIN — MAGNESIUM SULFATE IN WATER 2 G: 40 INJECTION, SOLUTION INTRAVENOUS at 11:11

## 2017-01-01 RX ADMIN — LORAZEPAM 1.5 MG: 2 INJECTION INTRAMUSCULAR; INTRAVENOUS at 10:10

## 2017-01-01 RX ADMIN — POTASSIUM CHLORIDE 20 MEQ: 14.9 INJECTION, SOLUTION INTRAVENOUS at 09:44

## 2017-01-01 RX ADMIN — PROPOFOL 15 MCG/KG/MIN: 10 INJECTION, EMULSION INTRAVENOUS at 11:22

## 2017-01-01 RX ADMIN — PROPOFOL 20 MCG/KG/MIN: 10 INJECTION, EMULSION INTRAVENOUS at 04:33

## 2017-01-01 RX ADMIN — SUCRALFATE 1 G: 1 SUSPENSION ORAL at 05:53

## 2017-01-01 RX ADMIN — RIFAXIMIN 550 MG: 550 TABLET ORAL at 08:02

## 2017-01-01 RX ADMIN — RIFAXIMIN 550 MG: 550 TABLET ORAL at 23:13

## 2017-01-01 RX ADMIN — GABAPENTIN 100 MG: 100 CAPSULE ORAL at 15:01

## 2017-01-01 RX ADMIN — SUCRALFATE 1 G: 1 SUSPENSION ORAL at 12:46

## 2017-01-01 RX ADMIN — POTASSIUM CHLORIDE AND SODIUM CHLORIDE: 900; 150 INJECTION, SOLUTION INTRAVENOUS at 22:28

## 2017-01-01 RX ADMIN — ESOMEPRAZOLE SODIUM 8 MG/HR: 40 INJECTION INTRAVENOUS at 22:03

## 2017-01-01 RX ADMIN — LACTULOSE 30 ML: 20 SOLUTION ORAL at 21:00

## 2017-01-01 RX ADMIN — MAGNESIUM SULFATE IN WATER 2 G: 40 INJECTION, SOLUTION INTRAVENOUS at 08:02

## 2017-01-01 RX ADMIN — FUROSEMIDE 40 MG: 10 INJECTION, SOLUTION INTRAMUSCULAR; INTRAVENOUS at 14:13

## 2017-01-01 RX ADMIN — PROPOFOL 20 MCG/KG/MIN: 10 INJECTION, EMULSION INTRAVENOUS at 02:50

## 2017-01-01 RX ADMIN — LORAZEPAM 1.5 MG: 2 INJECTION INTRAMUSCULAR; INTRAVENOUS at 12:07

## 2017-01-01 RX ADMIN — ACETAMINOPHEN 650 MG: 325 TABLET, FILM COATED ORAL at 17:23

## 2017-01-01 RX ADMIN — PROPRANOLOL HYDROCHLORIDE 10 MG: 10 TABLET ORAL at 08:32

## 2017-01-01 RX ADMIN — CEFTRIAXONE 2 G: 2 INJECTION, POWDER, FOR SOLUTION INTRAMUSCULAR; INTRAVENOUS at 09:24

## 2017-01-01 RX ADMIN — SUCRALFATE 1 G: 1 SUSPENSION ORAL at 23:33

## 2017-01-01 RX ADMIN — OMEPRAZOLE 40 MG: 20 CAPSULE, DELAYED RELEASE ORAL at 09:20

## 2017-01-01 RX ADMIN — SUCRALFATE 1 G: 1 SUSPENSION ORAL at 01:05

## 2017-01-01 RX ADMIN — LORAZEPAM 1.5 MG: 2 INJECTION INTRAMUSCULAR; INTRAVENOUS at 23:22

## 2017-01-01 RX ADMIN — PROPOFOL 15 MCG/KG/MIN: 10 INJECTION, EMULSION INTRAVENOUS at 20:46

## 2017-01-01 RX ADMIN — FUROSEMIDE 40 MG: 10 INJECTION, SOLUTION INTRAMUSCULAR; INTRAVENOUS at 09:11

## 2017-01-01 RX ADMIN — RIFAXIMIN 550 MG: 550 TABLET ORAL at 21:07

## 2017-01-01 RX ADMIN — SODIUM CHLORIDE 8 MG/HR: 9 INJECTION, SOLUTION INTRAVENOUS at 14:19

## 2017-01-01 RX ADMIN — STANDARDIZED SENNA CONCENTRATE AND DOCUSATE SODIUM 2 TABLET: 8.6; 5 TABLET, FILM COATED ORAL at 20:34

## 2017-01-01 RX ADMIN — LORAZEPAM 1.5 MG: 2 INJECTION INTRAMUSCULAR; INTRAVENOUS at 05:07

## 2017-01-01 RX ADMIN — SUCRALFATE 1 G: 1 SUSPENSION ORAL at 10:10

## 2017-01-01 RX ADMIN — LORAZEPAM 1.5 MG: 2 INJECTION INTRAMUSCULAR; INTRAVENOUS at 11:32

## 2017-01-01 RX ADMIN — HALOPERIDOL LACTATE 5 MG: 5 INJECTION, SOLUTION INTRAMUSCULAR at 10:47

## 2017-01-01 RX ADMIN — PROPOFOL 20 MCG/KG/MIN: 10 INJECTION, EMULSION INTRAVENOUS at 09:56

## 2017-01-01 RX ADMIN — SODIUM CHLORIDE 8 MG/HR: 9 INJECTION, SOLUTION INTRAVENOUS at 20:04

## 2017-01-01 RX ADMIN — OMEPRAZOLE 20 MG: 20 CAPSULE, DELAYED RELEASE ORAL at 21:07

## 2017-01-01 RX ADMIN — FENTANYL CITRATE 50 MCG: 50 INJECTION, SOLUTION INTRAMUSCULAR; INTRAVENOUS at 09:23

## 2017-01-01 RX ADMIN — LORAZEPAM 1.5 MG: 2 INJECTION INTRAMUSCULAR; INTRAVENOUS at 23:28

## 2017-01-01 RX ADMIN — FOLIC ACID 1 MG: 1 TABLET ORAL at 11:22

## 2017-01-01 RX ADMIN — LORAZEPAM 1.5 MG: 2 INJECTION INTRAMUSCULAR; INTRAVENOUS at 13:10

## 2017-01-01 RX ADMIN — LORAZEPAM 1 MG: 2 INJECTION INTRAMUSCULAR; INTRAVENOUS at 09:11

## 2017-01-01 RX ADMIN — ACETAMINOPHEN 650 MG: 325 TABLET, FILM COATED ORAL at 20:00

## 2017-01-01 RX ADMIN — LORAZEPAM 2 MG: 2 INJECTION INTRAMUSCULAR; INTRAVENOUS at 07:08

## 2017-01-01 RX ADMIN — OMEPRAZOLE 20 MG: 20 CAPSULE, DELAYED RELEASE ORAL at 20:33

## 2017-01-01 RX ADMIN — IPRATROPIUM BROMIDE AND ALBUTEROL SULFATE 3 ML: .5; 3 SOLUTION RESPIRATORY (INHALATION) at 22:42

## 2017-01-01 RX ADMIN — LORAZEPAM 1 MG: 2 INJECTION INTRAMUSCULAR; INTRAVENOUS at 04:38

## 2017-01-01 RX ADMIN — LORAZEPAM 1.5 MG: 2 INJECTION INTRAMUSCULAR; INTRAVENOUS at 17:53

## 2017-01-01 RX ADMIN — CHLORHEXIDINE GLUCONATE 15 ML: 1.2 RINSE ORAL at 21:59

## 2017-01-01 RX ADMIN — HALOPERIDOL LACTATE 5 MG: 5 INJECTION, SOLUTION INTRAMUSCULAR at 08:02

## 2017-01-01 RX ADMIN — LORAZEPAM 1 MG: 2 INJECTION INTRAMUSCULAR; INTRAVENOUS at 23:37

## 2017-01-01 RX ADMIN — GABAPENTIN 100 MG: 100 CAPSULE ORAL at 20:38

## 2017-01-01 RX ADMIN — POTASSIUM CHLORIDE AND SODIUM CHLORIDE: 900; 150 INJECTION, SOLUTION INTRAVENOUS at 13:12

## 2017-01-01 RX ADMIN — Medication 100 MG: at 08:20

## 2017-01-01 RX ADMIN — SUCRALFATE 1 G: 1 SUSPENSION ORAL at 12:31

## 2017-01-01 RX ADMIN — ONDANSETRON 4 MG: 2 INJECTION INTRAMUSCULAR; INTRAVENOUS at 08:33

## 2017-01-01 RX ADMIN — PROPRANOLOL HYDROCHLORIDE 10 MG: 10 TABLET ORAL at 17:23

## 2017-01-01 RX ADMIN — LACTULOSE 30 ML: 10 SOLUTION ORAL at 20:32

## 2017-01-01 RX ADMIN — CHLORHEXIDINE GLUCONATE 15 ML: 1.2 RINSE ORAL at 20:32

## 2017-01-01 RX ADMIN — OCTREOTIDE ACETATE 50 MCG/HR: 200 INJECTION, SOLUTION INTRAVENOUS; SUBCUTANEOUS at 23:28

## 2017-01-01 RX ADMIN — OCTREOTIDE ACETATE 50 MCG/HR: 200 INJECTION, SOLUTION INTRAVENOUS; SUBCUTANEOUS at 18:50

## 2017-01-01 RX ADMIN — POTASSIUM CHLORIDE 20 MEQ: 200 INJECTION, SOLUTION INTRAVENOUS at 09:19

## 2017-01-01 RX ADMIN — SODIUM CHLORIDE 8 MG/HR: 9 INJECTION, SOLUTION INTRAVENOUS at 01:24

## 2017-01-01 RX ADMIN — PROPRANOLOL HYDROCHLORIDE 10 MG: 10 TABLET ORAL at 08:27

## 2017-01-01 RX ADMIN — FOLIC ACID 1 MG: 1 TABLET ORAL at 08:34

## 2017-01-01 RX ADMIN — CHLORHEXIDINE GLUCONATE 15 ML: 1.2 RINSE ORAL at 08:02

## 2017-01-01 RX ADMIN — DEXMEDETOMIDINE HYDROCHLORIDE 0.5 MCG/KG/HR: 100 INJECTION, SOLUTION INTRAVENOUS at 13:22

## 2017-01-01 RX ADMIN — ESOMEPRAZOLE SODIUM 8 MG/HR: 40 INJECTION INTRAVENOUS at 10:15

## 2017-01-01 RX ADMIN — MAGNESIUM SULFATE IN WATER 2 G: 40 INJECTION, SOLUTION INTRAVENOUS at 10:25

## 2017-01-01 RX ADMIN — LORAZEPAM 1.5 MG: 2 INJECTION INTRAMUSCULAR; INTRAVENOUS at 02:06

## 2017-01-01 RX ADMIN — LORAZEPAM 2 MG: 2 INJECTION INTRAMUSCULAR; INTRAVENOUS at 00:03

## 2017-01-01 RX ADMIN — LORAZEPAM 1.5 MG: 2 INJECTION INTRAMUSCULAR; INTRAVENOUS at 14:46

## 2017-01-01 RX ADMIN — SUCRALFATE 1 G: 1 SUSPENSION ORAL at 06:50

## 2017-01-01 RX ADMIN — POTASSIUM CHLORIDE 40 MEQ: 1500 TABLET, EXTENDED RELEASE ORAL at 10:39

## 2017-01-01 RX ADMIN — SUCRALFATE 1 G: 1 SUSPENSION ORAL at 17:19

## 2017-01-01 RX ADMIN — GABAPENTIN 100 MG: 100 CAPSULE ORAL at 08:27

## 2017-01-01 RX ADMIN — SUCRALFATE 1 G: 1 SUSPENSION ORAL at 17:21

## 2017-01-01 RX ADMIN — OMEPRAZOLE 20 MG: 20 CAPSULE, DELAYED RELEASE ORAL at 21:04

## 2017-01-01 RX ADMIN — SPIRONOLACTONE 50 MG: 50 TABLET ORAL at 15:20

## 2017-01-01 RX ADMIN — FENTANYL CITRATE 100 MCG: 50 INJECTION, SOLUTION INTRAMUSCULAR; INTRAVENOUS at 22:00

## 2017-01-01 RX ADMIN — LORAZEPAM 1.5 MG: 2 INJECTION INTRAMUSCULAR; INTRAVENOUS at 07:47

## 2017-01-01 RX ADMIN — Medication 100 MG: at 08:34

## 2017-01-01 RX ADMIN — GABAPENTIN 100 MG: 100 CAPSULE ORAL at 15:37

## 2017-01-01 RX ADMIN — SUCRALFATE 1 G: 1 SUSPENSION ORAL at 23:39

## 2017-01-01 RX ADMIN — LORAZEPAM 1.5 MG: 2 INJECTION INTRAMUSCULAR; INTRAVENOUS at 09:45

## 2017-01-01 RX ADMIN — SODIUM CHLORIDE 500 ML: 9 INJECTION, SOLUTION INTRAVENOUS at 02:35

## 2017-01-01 RX ADMIN — POTASSIUM CHLORIDE 20 MEQ: 1500 TABLET, EXTENDED RELEASE ORAL at 07:49

## 2017-01-01 RX ADMIN — OMEPRAZOLE 20 MG: 20 CAPSULE, DELAYED RELEASE ORAL at 20:11

## 2017-01-01 RX ADMIN — LACTULOSE 30 ML: 10 SOLUTION ORAL at 15:20

## 2017-01-01 RX ADMIN — GABAPENTIN 100 MG: 100 CAPSULE ORAL at 09:49

## 2017-01-01 RX ADMIN — IPRATROPIUM BROMIDE AND ALBUTEROL SULFATE 3 ML: .5; 3 SOLUTION RESPIRATORY (INHALATION) at 10:54

## 2017-01-01 RX ADMIN — IPRATROPIUM BROMIDE AND ALBUTEROL SULFATE 3 ML: .5; 3 SOLUTION RESPIRATORY (INHALATION) at 19:07

## 2017-01-01 RX ADMIN — SUCRALFATE 1 G: 1 SUSPENSION ORAL at 23:57

## 2017-01-01 RX ADMIN — LACTULOSE 30 ML: 20 SOLUTION ORAL at 14:51

## 2017-01-01 RX ADMIN — LORAZEPAM 2 MG: 2 INJECTION INTRAMUSCULAR; INTRAVENOUS at 17:09

## 2017-01-01 RX ADMIN — SUCRALFATE 1 G: 1 SUSPENSION ORAL at 00:28

## 2017-01-01 RX ADMIN — SODIUM CHLORIDE 8 MG/HR: 9 INJECTION, SOLUTION INTRAVENOUS at 05:56

## 2017-01-01 RX ADMIN — LORAZEPAM 1.5 MG: 2 INJECTION INTRAMUSCULAR; INTRAVENOUS at 08:25

## 2017-01-01 RX ADMIN — SUCRALFATE 1 G: 1 SUSPENSION ORAL at 17:23

## 2017-01-01 RX ADMIN — PROPRANOLOL HYDROCHLORIDE 10 MG: 10 TABLET ORAL at 07:45

## 2017-01-01 RX ADMIN — PROPOFOL 15 MCG/KG/MIN: 10 INJECTION, EMULSION INTRAVENOUS at 01:51

## 2017-01-01 RX ADMIN — LORAZEPAM 1.5 MG: 2 INJECTION INTRAMUSCULAR; INTRAVENOUS at 21:15

## 2017-01-01 RX ADMIN — GABAPENTIN 100 MG: 100 CAPSULE ORAL at 14:02

## 2017-01-01 RX ADMIN — SUCRALFATE 1 G: 1 SUSPENSION ORAL at 11:21

## 2017-01-01 RX ADMIN — GABAPENTIN 100 MG: 100 CAPSULE ORAL at 08:32

## 2017-01-01 RX ADMIN — SUCRALFATE 1 G: 1 SUSPENSION ORAL at 12:33

## 2017-01-01 RX ADMIN — LORAZEPAM 1 MG: 1 TABLET ORAL at 16:34

## 2017-01-01 RX ADMIN — OCTREOTIDE ACETATE 50 MCG: 50 INJECTION, SOLUTION INTRAVENOUS; SUBCUTANEOUS at 14:32

## 2017-01-01 RX ADMIN — FENTANYL CITRATE 50 MCG: 50 INJECTION, SOLUTION INTRAMUSCULAR; INTRAVENOUS at 08:23

## 2017-01-01 RX ADMIN — SODIUM CHLORIDE 8 MG/HR: 9 INJECTION, SOLUTION INTRAVENOUS at 02:00

## 2017-01-01 RX ADMIN — SUCRALFATE 1 G: 1 SUSPENSION ORAL at 05:43

## 2017-01-01 RX ADMIN — CHLORHEXIDINE GLUCONATE 15 ML: 1.2 RINSE ORAL at 20:15

## 2017-01-01 RX ADMIN — GABAPENTIN 100 MG: 100 CAPSULE ORAL at 15:20

## 2017-01-01 RX ADMIN — SUCRALFATE 1 G: 1 SUSPENSION ORAL at 23:32

## 2017-01-01 RX ADMIN — PROPRANOLOL HYDROCHLORIDE 10 MG: 10 TABLET ORAL at 08:09

## 2017-01-01 RX ADMIN — OMEPRAZOLE 20 MG: 20 CAPSULE, DELAYED RELEASE ORAL at 20:02

## 2017-01-01 RX ADMIN — SODIUM CHLORIDE 8 MG/HR: 9 INJECTION, SOLUTION INTRAVENOUS at 14:00

## 2017-01-01 RX ADMIN — ACETAMINOPHEN 650 MG: 325 TABLET, FILM COATED ORAL at 21:42

## 2017-01-01 RX ADMIN — POTASSIUM CHLORIDE AND SODIUM CHLORIDE: 900; 150 INJECTION, SOLUTION INTRAVENOUS at 18:41

## 2017-01-01 RX ADMIN — LACTULOSE 30 ML: 10 SOLUTION ORAL at 23:12

## 2017-01-01 RX ADMIN — STANDARDIZED SENNA CONCENTRATE AND DOCUSATE SODIUM 2 TABLET: 8.6; 5 TABLET, FILM COATED ORAL at 08:00

## 2017-01-01 RX ADMIN — SUCRALFATE 1 G: 1 SUSPENSION ORAL at 05:57

## 2017-01-01 RX ADMIN — CHLORHEXIDINE GLUCONATE 15 ML: 1.2 RINSE ORAL at 08:32

## 2017-01-01 RX ADMIN — ONDANSETRON 4 MG: 2 INJECTION INTRAMUSCULAR; INTRAVENOUS at 08:39

## 2017-01-01 RX ADMIN — LORAZEPAM 2 MG: 1 TABLET ORAL at 12:23

## 2017-01-01 RX ADMIN — PROMETHAZINE HYDROCHLORIDE 25 MG: 25 TABLET ORAL at 12:34

## 2017-01-01 RX ADMIN — LORAZEPAM 1.5 MG: 2 INJECTION INTRAMUSCULAR; INTRAVENOUS at 17:15

## 2017-01-01 RX ADMIN — LORAZEPAM 1 MG: 2 INJECTION INTRAMUSCULAR; INTRAVENOUS at 18:05

## 2017-01-01 RX ADMIN — LORAZEPAM 1.5 MG: 2 INJECTION INTRAMUSCULAR; INTRAVENOUS at 15:37

## 2017-01-01 RX ADMIN — SUCRALFATE 1 G: 1 SUSPENSION ORAL at 13:00

## 2017-01-01 RX ADMIN — MIDAZOLAM 2 MG: 1 INJECTION INTRAMUSCULAR; INTRAVENOUS at 15:00

## 2017-01-01 RX ADMIN — LORAZEPAM 1.5 MG: 2 INJECTION INTRAMUSCULAR; INTRAVENOUS at 10:19

## 2017-01-01 RX ADMIN — GABAPENTIN 100 MG: 100 CAPSULE ORAL at 20:32

## 2017-01-01 RX ADMIN — SUCRALFATE 1 G: 1 SUSPENSION ORAL at 23:45

## 2017-01-01 RX ADMIN — GABAPENTIN 100 MG: 100 CAPSULE ORAL at 21:37

## 2017-01-01 RX ADMIN — NOREPINEPHRINE BITARTRATE 2 MCG/MIN: 1 INJECTION INTRAVENOUS at 20:16

## 2017-01-01 RX ADMIN — THERA TABS 1 TABLET: TAB at 08:34

## 2017-01-01 RX ADMIN — SUCRALFATE 1 G: 1 SUSPENSION ORAL at 17:55

## 2017-01-01 RX ADMIN — SODIUM CHLORIDE 2000 ML: 9 INJECTION, SOLUTION INTRAVENOUS at 13:00

## 2017-01-01 RX ADMIN — ACETAMINOPHEN 650 MG: 325 TABLET, FILM COATED ORAL at 11:31

## 2017-01-01 RX ADMIN — SUCRALFATE 1 G: 1 SUSPENSION ORAL at 06:26

## 2017-01-01 RX ADMIN — RIFAXIMIN 550 MG: 550 TABLET ORAL at 20:34

## 2017-01-01 RX ADMIN — PROPRANOLOL HYDROCHLORIDE 10 MG: 10 TABLET ORAL at 03:04

## 2017-01-01 RX ADMIN — MIDAZOLAM HYDROCHLORIDE 2 MG: 1 INJECTION, SOLUTION INTRAMUSCULAR; INTRAVENOUS at 20:00

## 2017-01-01 RX ADMIN — LORAZEPAM 1.5 MG: 2 INJECTION INTRAMUSCULAR; INTRAVENOUS at 18:15

## 2017-01-01 RX ADMIN — SUCRALFATE 1 G: 1 SUSPENSION ORAL at 18:07

## 2017-01-01 RX ADMIN — SODIUM CHLORIDE 80 MG: 9 INJECTION, SOLUTION INTRAVENOUS at 14:18

## 2017-01-01 RX ADMIN — SODIUM CHLORIDE, POTASSIUM CHLORIDE, SODIUM LACTATE AND CALCIUM CHLORIDE: 600; 310; 30; 20 INJECTION, SOLUTION INTRAVENOUS at 05:56

## 2017-01-01 RX ADMIN — FOLIC ACID: 5 INJECTION, SOLUTION INTRAMUSCULAR; INTRAVENOUS; SUBCUTANEOUS at 16:44

## 2017-01-01 RX ADMIN — LACTULOSE 30 ML: 10 SOLUTION ORAL at 14:07

## 2017-01-01 RX ADMIN — LACTULOSE 30 ML: 20 SOLUTION ORAL at 09:50

## 2017-01-01 RX ADMIN — CHLORDIAZEPOXIDE HYDROCHLORIDE 25 MG: 25 CAPSULE ORAL at 20:33

## 2017-01-01 RX ADMIN — LORAZEPAM 1.5 MG: 2 INJECTION INTRAMUSCULAR; INTRAVENOUS at 02:41

## 2017-01-01 RX ADMIN — ONDANSETRON 4 MG: 2 INJECTION INTRAMUSCULAR; INTRAVENOUS at 16:35

## 2017-01-01 RX ADMIN — LORAZEPAM 2 MG: 1 TABLET ORAL at 20:22

## 2017-01-01 RX ADMIN — HALOPERIDOL LACTATE 5 MG: 5 INJECTION, SOLUTION INTRAMUSCULAR at 19:58

## 2017-01-01 RX ADMIN — ACETAMINOPHEN 650 MG: 325 TABLET, FILM COATED ORAL at 21:39

## 2017-01-01 RX ADMIN — LORAZEPAM 3 MG: 1 TABLET ORAL at 17:22

## 2017-01-01 RX ADMIN — LORAZEPAM 1.5 MG: 2 INJECTION INTRAMUSCULAR; INTRAVENOUS at 01:14

## 2017-01-01 RX ADMIN — LORAZEPAM 1.5 MG: 2 INJECTION INTRAMUSCULAR; INTRAVENOUS at 13:40

## 2017-01-01 RX ADMIN — GABAPENTIN 100 MG: 100 CAPSULE ORAL at 14:42

## 2017-01-01 RX ADMIN — LORAZEPAM 1 MG: 2 INJECTION INTRAMUSCULAR; INTRAVENOUS at 11:49

## 2017-01-01 RX ADMIN — HALOPERIDOL LACTATE 5 MG: 5 INJECTION, SOLUTION INTRAMUSCULAR at 03:08

## 2017-01-01 RX ADMIN — MIDAZOLAM HYDROCHLORIDE 2 MG: 1 INJECTION INTRAMUSCULAR; INTRAVENOUS at 20:00

## 2017-01-01 RX ADMIN — LORAZEPAM 2 MG: 2 INJECTION INTRAMUSCULAR; INTRAVENOUS at 14:54

## 2017-01-01 RX ADMIN — LORAZEPAM 1 MG: 1 TABLET ORAL at 21:12

## 2017-01-01 RX ADMIN — LACTULOSE 30 ML: 10 SOLUTION ORAL at 20:00

## 2017-01-01 RX ADMIN — LORAZEPAM 1.5 MG: 2 INJECTION INTRAMUSCULAR; INTRAVENOUS at 03:59

## 2017-01-01 RX ADMIN — FUROSEMIDE 40 MG: 10 INJECTION, SOLUTION INTRAMUSCULAR; INTRAVENOUS at 21:05

## 2017-01-01 RX ADMIN — CHLORDIAZEPOXIDE HYDROCHLORIDE 25 MG: 25 CAPSULE ORAL at 21:04

## 2017-01-01 RX ADMIN — CHLORHEXIDINE GLUCONATE 15 ML: 1.2 RINSE ORAL at 08:00

## 2017-01-01 RX ADMIN — SODIUM CHLORIDE 2721 ML: 9 INJECTION, SOLUTION INTRAVENOUS at 18:00

## 2017-01-01 RX ADMIN — LACTULOSE 30 ML: 10 SOLUTION ORAL at 21:39

## 2017-01-01 RX ADMIN — POTASSIUM CHLORIDE 10 MEQ: 7.46 INJECTION, SOLUTION INTRAVENOUS at 14:49

## 2017-01-01 RX ADMIN — SUCRALFATE 1 G: 1 SUSPENSION ORAL at 11:24

## 2017-01-01 RX ADMIN — CEFTRIAXONE 2 G: 2 INJECTION, POWDER, FOR SOLUTION INTRAMUSCULAR; INTRAVENOUS at 08:43

## 2017-01-01 RX ADMIN — KETAMINE HYDROCHLORIDE 40 MG: 50 INJECTION, SOLUTION INTRAMUSCULAR; INTRAVENOUS at 14:54

## 2017-01-01 RX ADMIN — LORAZEPAM 0.5 MG: 1 TABLET ORAL at 20:00

## 2017-01-01 RX ADMIN — LORAZEPAM 2 MG: 2 INJECTION INTRAMUSCULAR; INTRAVENOUS at 14:24

## 2017-01-01 RX ADMIN — LORAZEPAM 3 MG: 1 TABLET ORAL at 20:12

## 2017-01-01 RX ADMIN — SODIUM CHLORIDE 8 MG/HR: 9 INJECTION, SOLUTION INTRAVENOUS at 09:15

## 2017-01-01 RX ADMIN — Medication 1 PACKET: at 12:21

## 2017-01-01 RX ADMIN — INSULIN LISPRO 5 UNITS: 100 INJECTION, SOLUTION INTRAVENOUS; SUBCUTANEOUS at 00:15

## 2017-01-01 RX ADMIN — SUCRALFATE 1 G: 1 SUSPENSION ORAL at 00:55

## 2017-01-01 RX ADMIN — DIPHENHYDRAMINE HYDROCHLORIDE 25 MG: 50 INJECTION, SOLUTION INTRAMUSCULAR; INTRAVENOUS at 12:21

## 2017-01-01 RX ADMIN — IPRATROPIUM BROMIDE AND ALBUTEROL SULFATE 3 ML: .5; 3 SOLUTION RESPIRATORY (INHALATION) at 14:27

## 2017-01-01 RX ADMIN — SODIUM CHLORIDE 8 MG/HR: 9 INJECTION, SOLUTION INTRAVENOUS at 15:21

## 2017-01-01 RX ADMIN — PROMETHAZINE HYDROCHLORIDE 25 MG: 25 TABLET ORAL at 10:25

## 2017-01-01 RX ADMIN — PROPOFOL 20 MCG/KG/MIN: 10 INJECTION, EMULSION INTRAVENOUS at 20:04

## 2017-01-01 RX ADMIN — SODIUM CHLORIDE 8 MG/HR: 9 INJECTION, SOLUTION INTRAVENOUS at 14:36

## 2017-01-01 RX ADMIN — SUCRALFATE 1 G: 1 SUSPENSION ORAL at 21:15

## 2017-01-01 RX ADMIN — SUCRALFATE 1 G: 1 SUSPENSION ORAL at 23:28

## 2017-01-01 RX ADMIN — PROPOFOL 5 MCG/KG/MIN: 10 INJECTION, EMULSION INTRAVENOUS at 16:37

## 2017-01-01 RX ADMIN — PREDNISONE 40 MG: 20 TABLET ORAL at 14:41

## 2017-01-01 RX ADMIN — LORAZEPAM 2 MG: 2 INJECTION INTRAMUSCULAR; INTRAVENOUS at 20:34

## 2017-01-01 RX ADMIN — SUCRALFATE 1 G: 1 SUSPENSION ORAL at 17:59

## 2017-01-01 RX ADMIN — SUCRALFATE 1 G: 1 SUSPENSION ORAL at 05:28

## 2017-01-01 RX ADMIN — RIFAXIMIN 550 MG: 550 TABLET ORAL at 08:40

## 2017-01-01 RX ADMIN — LACTULOSE 30 ML: 10 SOLUTION ORAL at 14:13

## 2017-01-01 RX ADMIN — PROPOFOL 20 MCG/KG/MIN: 10 INJECTION, EMULSION INTRAVENOUS at 18:19

## 2017-01-01 RX ADMIN — STANDARDIZED SENNA CONCENTRATE AND DOCUSATE SODIUM 2 TABLET: 8.6; 5 TABLET, FILM COATED ORAL at 08:02

## 2017-01-01 RX ADMIN — LORAZEPAM 1 MG: 1 TABLET ORAL at 12:34

## 2017-01-01 RX ADMIN — GABAPENTIN 100 MG: 100 CAPSULE ORAL at 08:02

## 2017-01-01 RX ADMIN — PROPOFOL 10 MCG/KG/MIN: 10 INJECTION, EMULSION INTRAVENOUS at 09:15

## 2017-01-01 RX ADMIN — LACTULOSE 30 ML: 20 SOLUTION ORAL at 15:14

## 2017-01-01 RX ADMIN — LACTULOSE 30 ML: 10 SOLUTION ORAL at 20:15

## 2017-01-01 RX ADMIN — IPRATROPIUM BROMIDE AND ALBUTEROL SULFATE 3 ML: .5; 3 SOLUTION RESPIRATORY (INHALATION) at 14:50

## 2017-01-01 RX ADMIN — OCTREOTIDE ACETATE 50 MCG/HR: 200 INJECTION, SOLUTION INTRAVENOUS; SUBCUTANEOUS at 12:47

## 2017-01-01 RX ADMIN — OCTREOTIDE ACETATE 50 MCG/HR: 200 INJECTION, SOLUTION INTRAVENOUS; SUBCUTANEOUS at 06:26

## 2017-01-01 RX ADMIN — LORAZEPAM 2 MG: 1 TABLET ORAL at 10:23

## 2017-01-01 RX ADMIN — LORAZEPAM 1 MG: 1 TABLET ORAL at 15:46

## 2017-01-01 RX ADMIN — LACTULOSE 30 ML: 20 SOLUTION ORAL at 21:07

## 2017-01-01 RX ADMIN — ONDANSETRON 8 MG: 2 INJECTION INTRAMUSCULAR; INTRAVENOUS at 14:09

## 2017-01-01 RX ADMIN — NOREPINEPHRINE BITARTRATE 16 MCG/MIN: 1 INJECTION INTRAVENOUS at 04:36

## 2017-01-01 RX ADMIN — CHLORDIAZEPOXIDE HYDROCHLORIDE 25 MG: 25 CAPSULE ORAL at 04:41

## 2017-01-01 RX ADMIN — PROPRANOLOL HYDROCHLORIDE 10 MG: 10 TABLET ORAL at 00:02

## 2017-01-01 RX ADMIN — SUCRALFATE 1 G: 1 SUSPENSION ORAL at 17:26

## 2017-01-01 RX ADMIN — LACTULOSE 30 ML: 20 SOLUTION ORAL at 20:14

## 2017-01-01 RX ADMIN — IPRATROPIUM BROMIDE AND ALBUTEROL SULFATE 3 ML: .5; 3 SOLUTION RESPIRATORY (INHALATION) at 11:20

## 2017-01-01 RX ADMIN — SPIRONOLACTONE 50 MG: 50 TABLET ORAL at 06:22

## 2017-01-01 RX ADMIN — RIFAXIMIN 550 MG: 550 TABLET ORAL at 08:27

## 2017-01-01 RX ADMIN — RIFAXIMIN 550 MG: 550 TABLET ORAL at 14:06

## 2017-01-01 RX ADMIN — FUROSEMIDE 40 MG: 10 INJECTION, SOLUTION INTRAMUSCULAR; INTRAVENOUS at 12:59

## 2017-01-01 RX ADMIN — RIFAXIMIN 550 MG: 550 TABLET ORAL at 20:38

## 2017-01-01 RX ADMIN — CEFTRIAXONE SODIUM 2 G: 2 INJECTION, POWDER, FOR SOLUTION INTRAMUSCULAR; INTRAVENOUS at 14:32

## 2017-01-01 RX ADMIN — SUCRALFATE 1 G: 1 SUSPENSION ORAL at 14:06

## 2017-01-01 RX ADMIN — SUCRALFATE 1 G: 1 SUSPENSION ORAL at 03:23

## 2017-01-01 RX ADMIN — OMEPRAZOLE 40 MG: 20 CAPSULE, DELAYED RELEASE ORAL at 20:00

## 2017-01-01 RX ADMIN — IPRATROPIUM BROMIDE AND ALBUTEROL SULFATE 3 ML: .5; 3 SOLUTION RESPIRATORY (INHALATION) at 02:46

## 2017-01-01 RX ADMIN — CEFTRIAXONE 2 G: 2 INJECTION, POWDER, FOR SOLUTION INTRAMUSCULAR; INTRAVENOUS at 07:59

## 2017-01-01 RX ADMIN — ESOMEPRAZOLE SODIUM 8 MG/HR: 40 INJECTION INTRAVENOUS at 23:37

## 2017-01-01 RX ADMIN — ESOMEPRAZOLE SODIUM 8 MG/HR: 40 INJECTION INTRAVENOUS at 02:00

## 2017-01-01 RX ADMIN — LORAZEPAM 1 MG: 2 INJECTION INTRAMUSCULAR; INTRAVENOUS at 15:13

## 2017-01-01 RX ADMIN — CHLORHEXIDINE GLUCONATE 15 ML: 1.2 RINSE ORAL at 20:38

## 2017-01-01 RX ADMIN — SODIUM CHLORIDE 8 MG/HR: 9 INJECTION, SOLUTION INTRAVENOUS at 20:42

## 2017-01-01 RX ADMIN — FOLIC ACID: 5 INJECTION, SOLUTION INTRAMUSCULAR; INTRAVENOUS; SUBCUTANEOUS at 16:30

## 2017-01-01 RX ADMIN — AMPICILLIN SODIUM AND SULBACTAM SODIUM 3 G: 2; 1 INJECTION, POWDER, FOR SOLUTION INTRAMUSCULAR; INTRAVENOUS at 09:15

## 2017-01-01 RX ADMIN — HALOPERIDOL LACTATE 5 MG: 5 INJECTION, SOLUTION INTRAMUSCULAR at 22:00

## 2017-01-01 RX ADMIN — LACTULOSE 30 ML: 10 SOLUTION ORAL at 20:38

## 2017-01-01 RX ADMIN — POTASSIUM CHLORIDE 20 MEQ: 1500 TABLET, EXTENDED RELEASE ORAL at 09:11

## 2017-01-01 RX ADMIN — LORAZEPAM 1.5 MG: 2 INJECTION INTRAMUSCULAR; INTRAVENOUS at 19:41

## 2017-01-01 RX ADMIN — SUCRALFATE 1 G: 1 SUSPENSION ORAL at 11:34

## 2017-01-01 RX ADMIN — OMEPRAZOLE 40 MG: 20 CAPSULE, DELAYED RELEASE ORAL at 15:34

## 2017-01-01 RX ADMIN — FUROSEMIDE 40 MG: 10 INJECTION, SOLUTION INTRAMUSCULAR; INTRAVENOUS at 21:39

## 2017-01-01 RX ADMIN — IPRATROPIUM BROMIDE AND ALBUTEROL SULFATE 3 ML: .5; 3 SOLUTION RESPIRATORY (INHALATION) at 07:16

## 2017-01-01 RX ADMIN — STANDARDIZED SENNA CONCENTRATE AND DOCUSATE SODIUM 2 TABLET: 8.6; 5 TABLET, FILM COATED ORAL at 07:59

## 2017-01-01 RX ADMIN — LORAZEPAM 1.5 MG: 2 INJECTION INTRAMUSCULAR; INTRAVENOUS at 07:51

## 2017-01-01 RX ADMIN — THIAMINE HYDROCHLORIDE 1000 ML: 100 INJECTION, SOLUTION INTRAMUSCULAR; INTRAVENOUS at 23:37

## 2017-01-01 RX ADMIN — IPRATROPIUM BROMIDE AND ALBUTEROL SULFATE 3 ML: .5; 3 SOLUTION RESPIRATORY (INHALATION) at 06:42

## 2017-01-01 RX ADMIN — LORAZEPAM 1 MG: 1 TABLET ORAL at 00:32

## 2017-01-01 RX ADMIN — IPRATROPIUM BROMIDE AND ALBUTEROL SULFATE 3 ML: .5; 3 SOLUTION RESPIRATORY (INHALATION) at 19:04

## 2017-01-01 RX ADMIN — GABAPENTIN 100 MG: 100 CAPSULE ORAL at 20:15

## 2017-01-01 RX ADMIN — PROPRANOLOL HYDROCHLORIDE 10 MG: 10 TABLET ORAL at 09:50

## 2017-01-01 RX ADMIN — ESOMEPRAZOLE SODIUM 8 MG/HR: 40 INJECTION INTRAVENOUS at 18:09

## 2017-01-01 RX ADMIN — IPRATROPIUM BROMIDE AND ALBUTEROL SULFATE 3 ML: .5; 3 SOLUTION RESPIRATORY (INHALATION) at 11:25

## 2017-01-01 RX ADMIN — FUROSEMIDE 40 MG: 10 INJECTION, SOLUTION INTRAMUSCULAR; INTRAVENOUS at 09:20

## 2017-01-01 RX ADMIN — OCTREOTIDE ACETATE 50 MCG/HR: 200 INJECTION, SOLUTION INTRAVENOUS; SUBCUTANEOUS at 20:42

## 2017-01-01 RX ADMIN — ONDANSETRON 4 MG: 2 INJECTION INTRAMUSCULAR; INTRAVENOUS at 03:03

## 2017-01-01 RX ADMIN — STANDARDIZED SENNA CONCENTRATE AND DOCUSATE SODIUM 2 TABLET: 8.6; 5 TABLET, FILM COATED ORAL at 08:27

## 2017-01-01 RX ADMIN — LACTULOSE 30 ML: 10 SOLUTION ORAL at 21:10

## 2017-01-01 RX ADMIN — LORAZEPAM 2 MG: 2 INJECTION INTRAMUSCULAR; INTRAVENOUS at 23:45

## 2017-01-01 RX ADMIN — LACTULOSE 30 ML: 10 SOLUTION ORAL at 08:02

## 2017-01-01 RX ADMIN — PROMETHAZINE HYDROCHLORIDE 25 MG: 25 TABLET ORAL at 17:44

## 2017-01-01 RX ADMIN — RIFAXIMIN 550 MG: 550 TABLET ORAL at 09:49

## 2017-01-01 RX ADMIN — PROPRANOLOL HYDROCHLORIDE 10 MG: 10 TABLET ORAL at 01:20

## 2017-01-01 ASSESSMENT — LIFESTYLE VARIABLES
NAUSEA AND VOMITING: NO NAUSEA AND NO VOMITING
TREMOR: NO TREMOR
HEADACHE, FULLNESS IN HEAD: VERY MILD
ANXIETY: *
NAUSEA AND VOMITING: NO NAUSEA AND NO VOMITING
AGITATION: *
NAUSEA AND VOMITING: NO NAUSEA AND NO VOMITING
VISUAL DISTURBANCES: EXTREMELY SEVERE HALLUCINATIONS
TOTAL SCORE: 8
TOTAL SCORE: 10
AUDITORY DISTURBANCES: VERY MILD HARSHNESS OR ABILITY TO FRIGHTEN
PAROXYSMAL SWEATS: BARELY PERCEPTIBLE SWEATING. PALMS MOIST
NAUSEA AND VOMITING: NO NAUSEA AND NO VOMITING
ANXIETY: *
AGITATION: *
PAROXYSMAL SWEATS: NO SWEAT VISIBLE
AUDITORY DISTURBANCES: MILD HARSHNESS OR ABILITY TO FRIGHTEN
AUDITORY DISTURBANCES: MILD HARSHNESS OR ABILITY TO FRIGHTEN
AGITATION: *
HEADACHE, FULLNESS IN HEAD: NOT PRESENT
NAUSEA AND VOMITING: NO NAUSEA AND NO VOMITING
PAROXYSMAL SWEATS: BARELY PERCEPTIBLE SWEATING. PALMS MOIST
PAROXYSMAL SWEATS: NO SWEAT VISIBLE
TOTAL SCORE: 11
NAUSEA AND VOMITING: NO NAUSEA AND NO VOMITING
REASON UNABLE TO ASSESS: INTUBATED
AUDITORY DISTURBANCES: NOT PRESENT
TOTAL SCORE: 25
NAUSEA AND VOMITING: NO NAUSEA AND NO VOMITING
AGITATION: SOMEWHAT MORE THAN NORMAL ACTIVITY
TREMOR: TREMOR NOT VISIBLE BUT CAN BE FELT, FINGERTIP TO FINGERTIP
TREMOR: *
DO YOU DRINK ALCOHOL: YES
DOES PATIENT WANT TO STOP DRINKING: YES
HOW MANY TIMES IN THE PAST YEAR HAVE YOU HAD 5 OR MORE DRINKS IN A DAY: 10
AGITATION: *
HEADACHE, FULLNESS IN HEAD: NOT PRESENT
PAROXYSMAL SWEATS: NO SWEAT VISIBLE
TOTAL SCORE: 9
PAROXYSMAL SWEATS: BARELY PERCEPTIBLE SWEATING. PALMS MOIST
AGITATION: *
TREMOR: TREMOR NOT VISIBLE BUT CAN BE FELT, FINGERTIP TO FINGERTIP
TREMOR: *
TREMOR: *
NAUSEA AND VOMITING: NO NAUSEA AND NO VOMITING
HEADACHE, FULLNESS IN HEAD: NOT PRESENT
VISUAL DISTURBANCES: EXTREMELY SEVERE HALLUCINATIONS
TREMOR: TREMOR NOT VISIBLE BUT CAN BE FELT, FINGERTIP TO FINGERTIP
ORIENTATION AND CLOUDING OF SENSORIUM: DISORIENTED FOR PLACE AND / OR PERSON
HAVE YOU EVER FELT YOU SHOULD CUT DOWN ON YOUR DRINKING: YES
ORIENTATION AND CLOUDING OF SENSORIUM: DISORIENTED FOR PLACE AND / OR PERSON
HEADACHE, FULLNESS IN HEAD: NOT PRESENT
TOTAL SCORE: 21
VISUAL DISTURBANCES: NOT PRESENT
AUDITORY DISTURBANCES: NOT PRESENT
NAUSEA AND VOMITING: NO NAUSEA AND NO VOMITING
VISUAL DISTURBANCES: MODERATELY SEVERE HALLUCINATIONS
PAROXYSMAL SWEATS: NO SWEAT VISIBLE
HOW MANY TIMES IN THE PAST YEAR HAVE YOU HAD 5 OR MORE DRINKS IN A DAY: 10
TREMOR: TREMOR NOT VISIBLE BUT CAN BE FELT, FINGERTIP TO FINGERTIP
ORIENTATION AND CLOUDING OF SENSORIUM: ORIENTED AND CAN DO SERIAL ADDITIONS
VISUAL DISTURBANCES: MODERATE SENSITIVITY
AGITATION: MODERATELY FIDGETY AND RESTLESS
ANXIETY: *
ANXIETY: *
ANXIETY: MILDLY ANXIOUS
NAUSEA AND VOMITING: NO NAUSEA AND NO VOMITING
VISUAL DISTURBANCES: MILD SENSITIVITY
ANXIETY: MODERATELY ANXIOUS OR GUARDED, SO ANXIETY IS INFERRED
TOTAL SCORE: 14
CONSUMPTION TOTAL: POSITIVE
AUDITORY DISTURBANCES: NOT PRESENT
TREMOR: TREMOR NOT VISIBLE BUT CAN BE FELT, FINGERTIP TO FINGERTIP
TREMOR: TREMOR NOT VISIBLE BUT CAN BE FELT, FINGERTIP TO FINGERTIP
TOTAL SCORE: 12
ORIENTATION AND CLOUDING OF SENSORIUM: DISORIENTED FOR PLACE AND / OR PERSON
VISUAL DISTURBANCES: MODERATE SENSITIVITY
HEADACHE, FULLNESS IN HEAD: NOT PRESENT
VISUAL DISTURBANCES: MODERATE SENSITIVITY
VISUAL DISTURBANCES: NOT PRESENT
VISUAL DISTURBANCES: NOT PRESENT
AGITATION: NORMAL ACTIVITY
AGITATION: *
TOTAL SCORE: 21
HEADACHE, FULLNESS IN HEAD: MILD
TOTAL SCORE: 6
AGITATION: MODERATELY FIDGETY AND RESTLESS
NAUSEA AND VOMITING: NO NAUSEA AND NO VOMITING
NAUSEA AND VOMITING: *
VISUAL DISTURBANCES: NOT PRESENT
TOTAL SCORE: 14
AGITATION: *
TREMOR: *
NAUSEA AND VOMITING: NO NAUSEA AND NO VOMITING
PAROXYSMAL SWEATS: NO SWEAT VISIBLE
TREMOR: *
TOTAL SCORE: 9
AGITATION: *
DO YOU DRINK ALCOHOL: YES
ANXIETY: MODERATELY ANXIOUS OR GUARDED, SO ANXIETY IS INFERRED
ORIENTATION AND CLOUDING OF SENSORIUM: DISORIENTED FOR PLACE AND / OR PERSON
AUDITORY DISTURBANCES: NOT PRESENT
VISUAL DISTURBANCES: NOT PRESENT
TOTAL SCORE: 5
NAUSEA AND VOMITING: NO NAUSEA AND NO VOMITING
ANXIETY: *
TREMOR: TREMOR NOT VISIBLE BUT CAN BE FELT, FINGERTIP TO FINGERTIP
VISUAL DISTURBANCES: MODERATELY SEVERE HALLUCINATIONS
VISUAL DISTURBANCES: NOT PRESENT
HAVE PEOPLE ANNOYED YOU BY CRITICIZING YOUR DRINKING: YES
SUBSTANCE_ABUSE: 1
TREMOR: NO TREMOR
AUDITORY DISTURBANCES: VERY MILD HARSHNESS OR ABILITY TO FRIGHTEN
AUDITORY DISTURBANCES: MODERATE HARSHNESS OR ABILITY TO FRIGHTEN
PAROXYSMAL SWEATS: *
ANXIETY: NO ANXIETY (AT EASE)
TOTAL SCORE: VERY MILD ITCHING, PINS AND NEEDLES SENSATION, BURNING OR NUMBNESS
PAROXYSMAL SWEATS: NO SWEAT VISIBLE
TREMOR: TREMOR NOT VISIBLE BUT CAN BE FELT, FINGERTIP TO FINGERTIP
PAROXYSMAL SWEATS: BARELY PERCEPTIBLE SWEATING. PALMS MOIST
HAVE YOU EVER FELT YOU SHOULD CUT DOWN ON YOUR DRINKING: YES
TREMOR: NO TREMOR
AGITATION: *
ANXIETY: MODERATELY ANXIOUS OR GUARDED, SO ANXIETY IS INFERRED
ORIENTATION AND CLOUDING OF SENSORIUM: DISORIENTED FOR PLACE AND / OR PERSON
PAROXYSMAL SWEATS: BARELY PERCEPTIBLE SWEATING. PALMS MOIST
PAROXYSMAL SWEATS: NO SWEAT VISIBLE
ORIENTATION AND CLOUDING OF SENSORIUM: DISORIENTED FOR PLACE AND / OR PERSON
TOTAL SCORE: 11
VISUAL DISTURBANCES: NOT PRESENT
HAVE PEOPLE ANNOYED YOU BY CRITICIZING YOUR DRINKING: YES
ANXIETY: MODERATELY ANXIOUS OR GUARDED, SO ANXIETY IS INFERRED
TREMOR: NO TREMOR
VISUAL DISTURBANCES: MODERATELY SEVERE HALLUCINATIONS
TREMOR: *
PAROXYSMAL SWEATS: NO SWEAT VISIBLE
VISUAL DISTURBANCES: NOT PRESENT
DOES PATIENT WANT TO TALK TO SOMEONE ABOUT QUITTING: YES
AUDITORY DISTURBANCES: NOT PRESENT
AUDITORY DISTURBANCES: VERY MILD HARSHNESS OR ABILITY TO FRIGHTEN
NAUSEA AND VOMITING: NO NAUSEA AND NO VOMITING
VISUAL DISTURBANCES: NOT PRESENT
PAROXYSMAL SWEATS: NO SWEAT VISIBLE
ORIENTATION AND CLOUDING OF SENSORIUM: DISORIENTED FOR PLACE AND / OR PERSON
TREMOR: TREMOR NOT VISIBLE BUT CAN BE FELT, FINGERTIP TO FINGERTIP
PAROXYSMAL SWEATS: NO SWEAT VISIBLE
VISUAL DISTURBANCES: MODERATELY SEVERE HALLUCINATIONS
NAUSEA AND VOMITING: NO NAUSEA AND NO VOMITING
ORIENTATION AND CLOUDING OF SENSORIUM: DISORIENTED FOR PLACE AND / OR PERSON
TOTAL SCORE: 21
AUDITORY DISTURBANCES: VERY MILD HARSHNESS OR ABILITY TO FRIGHTEN
AUDITORY DISTURBANCES: MODERATE HARSHNESS OR ABILITY TO FRIGHTEN
PAROXYSMAL SWEATS: *
ORIENTATION AND CLOUDING OF SENSORIUM: DISORIENTED FOR PLACE AND / OR PERSON
VISUAL DISTURBANCES: NOT PRESENT
VISUAL DISTURBANCES: NOT PRESENT
AUDITORY DISTURBANCES: MODERATELY SEVERE HALLUCINATIONS
HEADACHE, FULLNESS IN HEAD: NOT PRESENT
HAVE PEOPLE ANNOYED YOU BY CRITICIZING YOUR DRINKING: YES
NAUSEA AND VOMITING: NO NAUSEA AND NO VOMITING
ORIENTATION AND CLOUDING OF SENSORIUM: DISORIENTED FOR PLACE AND / OR PERSON
AUDITORY DISTURBANCES: MILD HARSHNESS OR ABILITY TO FRIGHTEN
AUDITORY DISTURBANCES: MILD HARSHNESS OR ABILITY TO FRIGHTEN
TOTAL SCORE: 11
TOTAL SCORE: VERY MILD ITCHING, PINS AND NEEDLES SENSATION, BURNING OR NUMBNESS
AUDITORY DISTURBANCES: MILD HARSHNESS OR ABILITY TO FRIGHTEN
AGITATION: *
NAUSEA AND VOMITING: NO NAUSEA AND NO VOMITING
ANXIETY: *
PAROXYSMAL SWEATS: BARELY PERCEPTIBLE SWEATING. PALMS MOIST
AUDITORY DISTURBANCES: VERY MILD HARSHNESS OR ABILITY TO FRIGHTEN
ANXIETY: MODERATELY ANXIOUS OR GUARDED, SO ANXIETY IS INFERRED
TOTAL SCORE: 20
HEADACHE, FULLNESS IN HEAD: NOT PRESENT
TOTAL SCORE: 14
AGITATION: *
PAROXYSMAL SWEATS: *
VISUAL DISTURBANCES: MODERATE SENSITIVITY
TOTAL SCORE: 9
ANXIETY: *
AUDITORY DISTURBANCES: NOT PRESENT
PAROXYSMAL SWEATS: NO SWEAT VISIBLE
AGITATION: *
HEADACHE, FULLNESS IN HEAD: NOT PRESENT
VISUAL DISTURBANCES: MODERATELY SEVERE HALLUCINATIONS
ORIENTATION AND CLOUDING OF SENSORIUM: DATE DISORIENTATION BY MORE THAN TWO CALENDAR DAYS
ANXIETY: MILDLY ANXIOUS
PAROXYSMAL SWEATS: NO SWEAT VISIBLE
PAROXYSMAL SWEATS: BARELY PERCEPTIBLE SWEATING. PALMS MOIST
TOTAL SCORE: 22
TOTAL SCORE: 21
NAUSEA AND VOMITING: NO NAUSEA AND NO VOMITING
NAUSEA AND VOMITING: NO NAUSEA AND NO VOMITING
TREMOR: TREMOR NOT VISIBLE BUT CAN BE FELT, FINGERTIP TO FINGERTIP
PAROXYSMAL SWEATS: *
PAROXYSMAL SWEATS: NO SWEAT VISIBLE
HAVE YOU EVER FELT YOU SHOULD CUT DOWN ON YOUR DRINKING: YES
ANXIETY: *
ANXIETY: *
HEADACHE, FULLNESS IN HEAD: MODERATE
TOTAL SCORE: 16
ANXIETY: *
AGITATION: *
HEADACHE, FULLNESS IN HEAD: NOT PRESENT
AUDITORY DISTURBANCES: MILD HARSHNESS OR ABILITY TO FRIGHTEN
HEADACHE, FULLNESS IN HEAD: NOT PRESENT
ORIENTATION AND CLOUDING OF SENSORIUM: DISORIENTED FOR PLACE AND / OR PERSON
ANXIETY: *
NAUSEA AND VOMITING: NO NAUSEA AND NO VOMITING
TOTAL SCORE: 4
ANXIETY: MODERATELY ANXIOUS OR GUARDED, SO ANXIETY IS INFERRED
VISUAL DISTURBANCES: MODERATELY SEVERE HALLUCINATIONS
PAROXYSMAL SWEATS: *
HEADACHE, FULLNESS IN HEAD: VERY MILD
HEADACHE, FULLNESS IN HEAD: NOT PRESENT
ANXIETY: *
PAROXYSMAL SWEATS: NO SWEAT VISIBLE
TREMOR: NO TREMOR
TOTAL SCORE: VERY MILD ITCHING, PINS AND NEEDLES SENSATION, BURNING OR NUMBNESS
TOTAL SCORE: 4
HEADACHE, FULLNESS IN HEAD: NOT PRESENT
TOTAL SCORE: 16
HEADACHE, FULLNESS IN HEAD: NOT PRESENT
PAROXYSMAL SWEATS: BARELY PERCEPTIBLE SWEATING. PALMS MOIST
PAROXYSMAL SWEATS: BARELY PERCEPTIBLE SWEATING. PALMS MOIST
ANXIETY: *
ORIENTATION AND CLOUDING OF SENSORIUM: DISORIENTED FOR PLACE AND / OR PERSON
TOTAL SCORE: 9
ANXIETY: *
ORIENTATION AND CLOUDING OF SENSORIUM: DATE DISORIENTATION BY MORE THAN TWO CALENDAR DAYS
DOES PATIENT WANT TO TALK TO SOMEONE ABOUT QUITTING: YES
HOW MANY TIMES IN THE PAST YEAR HAVE YOU HAD 5 OR MORE DRINKS IN A DAY: 10
NAUSEA AND VOMITING: NO NAUSEA AND NO VOMITING
TREMOR: *
ORIENTATION AND CLOUDING OF SENSORIUM: DISORIENTED FOR PLACE AND / OR PERSON
TOTAL SCORE: 15
NAUSEA AND VOMITING: NO NAUSEA AND NO VOMITING
HEADACHE, FULLNESS IN HEAD: NOT PRESENT
VISUAL DISTURBANCES: MODERATELY SEVERE HALLUCINATIONS
HEADACHE, FULLNESS IN HEAD: NOT PRESENT
NAUSEA AND VOMITING: NO NAUSEA AND NO VOMITING
AUDITORY DISTURBANCES: NOT PRESENT
ORIENTATION AND CLOUDING OF SENSORIUM: DISORIENTED FOR PLACE AND / OR PERSON
TOTAL SCORE: 11
DOES PATIENT WANT TO TALK TO SOMEONE ABOUT QUITTING: YES
AGITATION: *
TOTAL SCORE: MILD ITCHING, PINS AND NEEDLES SENSATION, BURNING OR NUMBNESS
AUDITORY DISTURBANCES: NOT PRESENT
AGITATION: PACES BACK AND FORTH DURING MOST OF THE INTERVIEW OR CONSTANTLY THRASHES ABOUT.
NAUSEA AND VOMITING: NO NAUSEA AND NO VOMITING
TREMOR: NO TREMOR
TREMOR: TREMOR NOT VISIBLE BUT CAN BE FELT, FINGERTIP TO FINGERTIP
AGITATION: *
ANXIETY: MODERATELY ANXIOUS OR GUARDED, SO ANXIETY IS INFERRED
AGITATION: MODERATELY FIDGETY AND RESTLESS
NAUSEA AND VOMITING: NO NAUSEA AND NO VOMITING
PAROXYSMAL SWEATS: NO SWEAT VISIBLE
ANXIETY: MILDLY ANXIOUS
HAVE PEOPLE ANNOYED YOU BY CRITICIZING YOUR DRINKING: YES
AUDITORY DISTURBANCES: NOT PRESENT
AUDITORY DISTURBANCES: NOT PRESENT
NAUSEA AND VOMITING: MILD NAUSEA WITH NO VOMITING
HEADACHE, FULLNESS IN HEAD: NOT PRESENT
AUDITORY DISTURBANCES: MILD HARSHNESS OR ABILITY TO FRIGHTEN
ORIENTATION AND CLOUDING OF SENSORIUM: DATE DISORIENTATION BY MORE THAN TWO CALENDAR DAYS
ORIENTATION AND CLOUDING OF SENSORIUM: DISORIENTED FOR PLACE AND / OR PERSON
TOTAL SCORE: 10
AUDITORY DISTURBANCES: VERY MILD HARSHNESS OR ABILITY TO FRIGHTEN
NAUSEA AND VOMITING: MILD NAUSEA WITH NO VOMITING
ANXIETY: MILDLY ANXIOUS
ANXIETY: *
TOTAL SCORE: 11
ORIENTATION AND CLOUDING OF SENSORIUM: DISORIENTED FOR PLACE AND / OR PERSON
TOTAL SCORE: 16
ORIENTATION AND CLOUDING OF SENSORIUM: ORIENTED AND CAN DO SERIAL ADDITIONS
ANXIETY: *
AUDITORY DISTURBANCES: VERY MILD HARSHNESS OR ABILITY TO FRIGHTEN
HEADACHE, FULLNESS IN HEAD: NOT PRESENT
NAUSEA AND VOMITING: NO NAUSEA AND NO VOMITING
TREMOR: NO TREMOR
NAUSEA AND VOMITING: NO NAUSEA AND NO VOMITING
VISUAL DISTURBANCES: MILD SENSITIVITY
HEADACHE, FULLNESS IN HEAD: MODERATELY SEVERE
HOW MANY TIMES IN THE PAST YEAR HAVE YOU HAD 5 OR MORE DRINKS IN A DAY: 10
PAROXYSMAL SWEATS: NO SWEAT VISIBLE
TOTAL SCORE: 26
VISUAL DISTURBANCES: MODERATE SENSITIVITY
ORIENTATION AND CLOUDING OF SENSORIUM: DISORIENTED FOR PLACE AND / OR PERSON
AGITATION: NORMAL ACTIVITY
AUDITORY DISTURBANCES: NOT PRESENT
AUDITORY DISTURBANCES: VERY MILD HARSHNESS OR ABILITY TO FRIGHTEN
VISUAL DISTURBANCES: MILD SENSITIVITY
ANXIETY: *
TOTAL SCORE: 24
AGITATION: *
TREMOR: *
HEADACHE, FULLNESS IN HEAD: NOT PRESENT
VISUAL DISTURBANCES: NOT PRESENT
ANXIETY: *
TREMOR: NO TREMOR
NAUSEA AND VOMITING: MILD NAUSEA WITH NO VOMITING
ORIENTATION AND CLOUDING OF SENSORIUM: DATE DISORIENTATION BY MORE THAN TWO CALENDAR DAYS
HEADACHE, FULLNESS IN HEAD: NOT PRESENT
PAROXYSMAL SWEATS: BARELY PERCEPTIBLE SWEATING. PALMS MOIST
DOES PATIENT WANT TO STOP DRINKING: YES
AGITATION: MODERATELY FIDGETY AND RESTLESS
HEADACHE, FULLNESS IN HEAD: NOT PRESENT
HEADACHE, FULLNESS IN HEAD: MILD
ORIENTATION AND CLOUDING OF SENSORIUM: DISORIENTED FOR PLACE AND / OR PERSON
PAROXYSMAL SWEATS: NO SWEAT VISIBLE
ORIENTATION AND CLOUDING OF SENSORIUM: DATE DISORIENTATION BY MORE THAN TWO CALENDAR DAYS
AUDITORY DISTURBANCES: MILD HARSHNESS OR ABILITY TO FRIGHTEN
TOTAL SCORE: MODERATE ITCHING, PINS AND NEEDLES SENSATION, BURNING OR NUMBNESS
TOTAL SCORE: 22
TOTAL SCORE: VERY MILD ITCHING, PINS AND NEEDLES SENSATION, BURNING OR NUMBNESS
AUDITORY DISTURBANCES: MILD HARSHNESS OR ABILITY TO FRIGHTEN
ANXIETY: NO ANXIETY (AT EASE)
ANXIETY: MILDLY ANXIOUS
AGITATION: *
ANXIETY: *
TOTAL SCORE: 24
TOTAL SCORE: 9
AGITATION: *
TOTAL SCORE: 15
VISUAL DISTURBANCES: NOT PRESENT
AGITATION: *
HEADACHE, FULLNESS IN HEAD: NOT PRESENT
HEADACHE, FULLNESS IN HEAD: NOT PRESENT
VISUAL DISTURBANCES: NOT PRESENT
AUDITORY DISTURBANCES: NOT PRESENT
PAROXYSMAL SWEATS: NO SWEAT VISIBLE
HEADACHE, FULLNESS IN HEAD: NOT PRESENT
ANXIETY: *
TOTAL SCORE: 4
VISUAL DISTURBANCES: NOT PRESENT
HEADACHE, FULLNESS IN HEAD: NOT PRESENT
DOES PATIENT WANT TO STOP DRINKING: NO
NAUSEA AND VOMITING: *
ANXIETY: *
TACTILE DISTURBANCES: VERY MILD ITCHING, PINS AND NEEDLES SENSATION, BURNING OR NUMBNESS
AUDITORY DISTURBANCES: MILD HARSHNESS OR ABILITY TO FRIGHTEN
TOTAL SCORE: MODERATELY SEVERE HALLUCINATIONS
ORIENTATION AND CLOUDING OF SENSORIUM: DISORIENTED FOR PLACE AND / OR PERSON
HEADACHE, FULLNESS IN HEAD: NOT PRESENT
TOTAL SCORE: 12
ORIENTATION AND CLOUDING OF SENSORIUM: ORIENTED AND CAN DO SERIAL ADDITIONS
AGITATION: MODERATELY FIDGETY AND RESTLESS
NAUSEA AND VOMITING: MILD NAUSEA WITH NO VOMITING
TOTAL SCORE: 4
TREMOR: TREMOR NOT VISIBLE BUT CAN BE FELT, FINGERTIP TO FINGERTIP
TREMOR: *
VISUAL DISTURBANCES: VERY MILD SENSITIVITY
ANXIETY: *
NAUSEA AND VOMITING: NO NAUSEA AND NO VOMITING
ANXIETY: *
HEADACHE, FULLNESS IN HEAD: NOT PRESENT
ORIENTATION AND CLOUDING OF SENSORIUM: DISORIENTED FOR PLACE AND / OR PERSON
TREMOR: TREMOR NOT VISIBLE BUT CAN BE FELT, FINGERTIP TO FINGERTIP
HOW MANY TIMES IN THE PAST YEAR HAVE YOU HAD 5 OR MORE DRINKS IN A DAY: 10
VISUAL DISTURBANCES: EXTREMELY SEVERE HALLUCINATIONS
ANXIETY: *
ANXIETY: *
ORIENTATION AND CLOUDING OF SENSORIUM: DISORIENTED FOR PLACE AND / OR PERSON
PAROXYSMAL SWEATS: BARELY PERCEPTIBLE SWEATING. PALMS MOIST
ORIENTATION AND CLOUDING OF SENSORIUM: DATE DISORIENTATION BY MORE THAN TWO CALENDAR DAYS
TOTAL SCORE: 16
ANXIETY: *
TOTAL SCORE: 15
HEADACHE, FULLNESS IN HEAD: NOT PRESENT
NAUSEA AND VOMITING: *
AUDITORY DISTURBANCES: NOT PRESENT
AGITATION: *
TREMOR: NO TREMOR
AUDITORY DISTURBANCES: NOT PRESENT
HEADACHE, FULLNESS IN HEAD: NOT PRESENT
NAUSEA AND VOMITING: *
HEADACHE, FULLNESS IN HEAD: VERY MILD
TREMOR: NO TREMOR
AUDITORY DISTURBANCES: NOT PRESENT
PAROXYSMAL SWEATS: NO SWEAT VISIBLE
AVERAGE NUMBER OF DAYS PER WEEK YOU HAVE A DRINK CONTAINING ALCOHOL: 1
TOTAL SCORE: 15
AGITATION: MODERATELY FIDGETY AND RESTLESS
AGITATION: *
NAUSEA AND VOMITING: NO NAUSEA AND NO VOMITING
AUDITORY DISTURBANCES: NOT PRESENT
AGITATION: *
ORIENTATION AND CLOUDING OF SENSORIUM: DISORIENTED FOR PLACE AND / OR PERSON
HEADACHE, FULLNESS IN HEAD: NOT PRESENT
TREMOR: *
VISUAL DISTURBANCES: MODERATE SENSITIVITY
AGITATION: *
NAUSEA AND VOMITING: NO NAUSEA AND NO VOMITING
ANXIETY: MODERATELY ANXIOUS OR GUARDED, SO ANXIETY IS INFERRED
PAROXYSMAL SWEATS: NO SWEAT VISIBLE
VISUAL DISTURBANCES: VERY MILD SENSITIVITY
PAROXYSMAL SWEATS: NO SWEAT VISIBLE
VISUAL DISTURBANCES: MILD SENSITIVITY
TOTAL SCORE: 22
AUDITORY DISTURBANCES: VERY MILD HARSHNESS OR ABILITY TO FRIGHTEN
ANXIETY: MODERATELY ANXIOUS OR GUARDED, SO ANXIETY IS INFERRED
TOTAL SCORE: 22
AUDITORY DISTURBANCES: MILD HARSHNESS OR ABILITY TO FRIGHTEN
TREMOR: *
TREMOR: NO TREMOR
NAUSEA AND VOMITING: NO NAUSEA AND NO VOMITING
AVERAGE NUMBER OF DAYS PER WEEK YOU HAVE A DRINK CONTAINING ALCOHOL: 7
AGITATION: MODERATELY FIDGETY AND RESTLESS
ANXIETY: *
TREMOR: NO TREMOR
NAUSEA AND VOMITING: NO NAUSEA AND NO VOMITING
AGITATION: *
AUDITORY DISTURBANCES: NOT PRESENT
AUDITORY DISTURBANCES: NOT PRESENT
VISUAL DISTURBANCES: VERY MILD SENSITIVITY
NAUSEA AND VOMITING: NO NAUSEA AND NO VOMITING
CONSUMPTION TOTAL: POSITIVE
ANXIETY: MODERATELY ANXIOUS OR GUARDED, SO ANXIETY IS INFERRED
CONSUMPTION TOTAL: POSITIVE
AGITATION: *
ORIENTATION AND CLOUDING OF SENSORIUM: DISORIENTED FOR PLACE AND / OR PERSON
TREMOR: *
ORIENTATION AND CLOUDING OF SENSORIUM: DATE DISORIENTATION BY MORE THAN TWO CALENDAR DAYS
ANXIETY: *
PAROXYSMAL SWEATS: *
TOTAL SCORE: 8
AGITATION: MODERATELY FIDGETY AND RESTLESS
NAUSEA AND VOMITING: NO NAUSEA AND NO VOMITING
ORIENTATION AND CLOUDING OF SENSORIUM: DISORIENTED FOR PLACE AND / OR PERSON
ANXIETY: NO ANXIETY (AT EASE)
TREMOR: *
ORIENTATION AND CLOUDING OF SENSORIUM: DISORIENTED FOR PLACE AND / OR PERSON
AVERAGE NUMBER OF DAYS PER WEEK YOU HAVE A DRINK CONTAINING ALCOHOL: 10
ANXIETY: *
PAROXYSMAL SWEATS: NO SWEAT VISIBLE
PAROXYSMAL SWEATS: NO SWEAT VISIBLE
ORIENTATION AND CLOUDING OF SENSORIUM: DISORIENTED FOR PLACE AND / OR PERSON
TOTAL SCORE: 22
VISUAL DISTURBANCES: MILD SENSITIVITY
VISUAL DISTURBANCES: MODERATE SENSITIVITY
TOTAL SCORE: 4
AGITATION: *
AGITATION: MODERATELY FIDGETY AND RESTLESS
ANXIETY: NO ANXIETY (AT EASE)
ORIENTATION AND CLOUDING OF SENSORIUM: DATE DISORIENTATION BY MORE THAN TWO CALENDAR DAYS
VISUAL DISTURBANCES: MODERATE SENSITIVITY
ANXIETY: *
NAUSEA AND VOMITING: NO NAUSEA AND NO VOMITING
NAUSEA AND VOMITING: MILD NAUSEA WITH NO VOMITING
TOTAL SCORE: 16
ANXIETY: *
VISUAL DISTURBANCES: NOT PRESENT
VISUAL DISTURBANCES: MODERATE SENSITIVITY
ORIENTATION AND CLOUDING OF SENSORIUM: DISORIENTED FOR PLACE AND / OR PERSON
ORIENTATION AND CLOUDING OF SENSORIUM: DATE DISORIENTATION BY MORE THAN TWO CALENDAR DAYS
VISUAL DISTURBANCES: NOT PRESENT
AGITATION: NORMAL ACTIVITY
TOTAL SCORE: 7
TOTAL SCORE: 25
AGITATION: *
HEADACHE, FULLNESS IN HEAD: NOT PRESENT
AUDITORY DISTURBANCES: NOT PRESENT
HEADACHE, FULLNESS IN HEAD: MILD
ANXIETY: *
PAROXYSMAL SWEATS: BARELY PERCEPTIBLE SWEATING. PALMS MOIST
TREMOR: *
ANXIETY: MODERATELY ANXIOUS OR GUARDED, SO ANXIETY IS INFERRED
AUDITORY DISTURBANCES: NOT PRESENT
HEADACHE, FULLNESS IN HEAD: NOT PRESENT
VISUAL DISTURBANCES: NOT PRESENT
VISUAL DISTURBANCES: MODERATELY SEVERE HALLUCINATIONS
AUDITORY DISTURBANCES: VERY MILD HARSHNESS OR ABILITY TO FRIGHTEN
TOTAL SCORE: 9
NAUSEA AND VOMITING: NO NAUSEA AND NO VOMITING
ANXIETY: MODERATELY ANXIOUS OR GUARDED, SO ANXIETY IS INFERRED
VISUAL DISTURBANCES: VERY MILD SENSITIVITY
ANXIETY: MILDLY ANXIOUS
TREMOR: NO TREMOR
PAROXYSMAL SWEATS: BARELY PERCEPTIBLE SWEATING. PALMS MOIST
AGITATION: *
TREMOR: TREMOR NOT VISIBLE BUT CAN BE FELT, FINGERTIP TO FINGERTIP
NAUSEA AND VOMITING: NO NAUSEA AND NO VOMITING
AGITATION: SOMEWHAT MORE THAN NORMAL ACTIVITY
TREMOR: *
HEADACHE, FULLNESS IN HEAD: NOT PRESENT
NAUSEA AND VOMITING: NO NAUSEA AND NO VOMITING
AUDITORY DISTURBANCES: MILD HARSHNESS OR ABILITY TO FRIGHTEN
PAROXYSMAL SWEATS: BARELY PERCEPTIBLE SWEATING. PALMS MOIST
TOTAL SCORE: 20
ANXIETY: *
AUDITORY DISTURBANCES: NOT PRESENT
TOTAL SCORE: 26
EVER FELT BAD OR GUILTY ABOUT YOUR DRINKING: YES
AGITATION: MODERATELY FIDGETY AND RESTLESS
NAUSEA AND VOMITING: NO NAUSEA AND NO VOMITING
ANXIETY: MODERATELY ANXIOUS OR GUARDED, SO ANXIETY IS INFERRED
TREMOR: NO TREMOR
HEADACHE, FULLNESS IN HEAD: NOT PRESENT
TOTAL SCORE: 3
NAUSEA AND VOMITING: NO NAUSEA AND NO VOMITING
ANXIETY: *
PAROXYSMAL SWEATS: NO SWEAT VISIBLE
TACTILE DISTURBANCES: VERY MILD ITCHING, PINS AND NEEDLES SENSATION, BURNING OR NUMBNESS
NAUSEA AND VOMITING: NO NAUSEA AND NO VOMITING
ON A TYPICAL DAY WHEN YOU DRINK ALCOHOL HOW MANY DRINKS DO YOU HAVE: 6
ANXIETY: MODERATELY ANXIOUS OR GUARDED, SO ANXIETY IS INFERRED
ANXIETY: NO ANXIETY (AT EASE)
TOTAL SCORE: 15
PAROXYSMAL SWEATS: BARELY PERCEPTIBLE SWEATING. PALMS MOIST
VISUAL DISTURBANCES: MODERATELY SEVERE HALLUCINATIONS
TREMOR: TREMOR NOT VISIBLE BUT CAN BE FELT, FINGERTIP TO FINGERTIP
AGITATION: PACES BACK AND FORTH DURING MOST OF THE INTERVIEW OR CONSTANTLY THRASHES ABOUT.
TREMOR: NO TREMOR
AUDITORY DISTURBANCES: MODERATE HARSHNESS OR ABILITY TO FRIGHTEN
AGITATION: SOMEWHAT MORE THAN NORMAL ACTIVITY
TREMOR: *
VISUAL DISTURBANCES: MODERATELY SEVERE HALLUCINATIONS
AGITATION: *
VISUAL DISTURBANCES: NOT PRESENT
AGITATION: *
HEADACHE, FULLNESS IN HEAD: NOT PRESENT
AUDITORY DISTURBANCES: NOT PRESENT
TREMOR: TREMOR NOT VISIBLE BUT CAN BE FELT, FINGERTIP TO FINGERTIP
ORIENTATION AND CLOUDING OF SENSORIUM: DATE DISORIENTATION BY MORE THAN TWO CALENDAR DAYS
TOTAL SCORE: 26
NAUSEA AND VOMITING: NO NAUSEA AND NO VOMITING
PAROXYSMAL SWEATS: *
VISUAL DISTURBANCES: VERY MILD SENSITIVITY
NAUSEA AND VOMITING: NO NAUSEA AND NO VOMITING
VISUAL DISTURBANCES: NOT PRESENT
HEADACHE, FULLNESS IN HEAD: NOT PRESENT
PAROXYSMAL SWEATS: NO SWEAT VISIBLE
ANXIETY: MODERATELY ANXIOUS OR GUARDED, SO ANXIETY IS INFERRED
TOTAL SCORE: 17
TOTAL SCORE: 23
PAROXYSMAL SWEATS: NO SWEAT VISIBLE
AGITATION: *
HEADACHE, FULLNESS IN HEAD: NOT PRESENT
VISUAL DISTURBANCES: NOT PRESENT
PAROXYSMAL SWEATS: NO SWEAT VISIBLE
PAROXYSMAL SWEATS: BARELY PERCEPTIBLE SWEATING. PALMS MOIST
AGITATION: *
ON A TYPICAL DAY WHEN YOU DRINK ALCOHOL HOW MANY DRINKS DO YOU HAVE: 6
NAUSEA AND VOMITING: NO NAUSEA AND NO VOMITING
AUDITORY DISTURBANCES: MODERATELY SEVERE HALLUCINATIONS
HEADACHE, FULLNESS IN HEAD: NOT PRESENT
HEADACHE, FULLNESS IN HEAD: MODERATE
NAUSEA AND VOMITING: NO NAUSEA AND NO VOMITING
ORIENTATION AND CLOUDING OF SENSORIUM: DISORIENTED FOR PLACE AND / OR PERSON
TOTAL SCORE: 4
AUDITORY DISTURBANCES: MODERATELY SEVERE HALLUCINATIONS
TOTAL SCORE: 25
HEADACHE, FULLNESS IN HEAD: NOT PRESENT
ANXIETY: *
ORIENTATION AND CLOUDING OF SENSORIUM: DISORIENTED FOR PLACE AND / OR PERSON
AGITATION: SOMEWHAT MORE THAN NORMAL ACTIVITY
ORIENTATION AND CLOUDING OF SENSORIUM: DATE DISORIENTATION BY MORE THAN TWO CALENDAR DAYS
HEADACHE, FULLNESS IN HEAD: VERY MILD
VISUAL DISTURBANCES: MILD SENSITIVITY
ANXIETY: NO ANXIETY (AT EASE)
DOES PATIENT WANT TO STOP DRINKING: YES
TREMOR: TREMOR NOT VISIBLE BUT CAN BE FELT, FINGERTIP TO FINGERTIP
NAUSEA AND VOMITING: NO NAUSEA AND NO VOMITING
PAROXYSMAL SWEATS: NO SWEAT VISIBLE
TOTAL SCORE: 4
HEADACHE, FULLNESS IN HEAD: NOT PRESENT
TREMOR: NO TREMOR
HEADACHE, FULLNESS IN HEAD: NOT PRESENT
HEADACHE, FULLNESS IN HEAD: NOT PRESENT
NAUSEA AND VOMITING: NO NAUSEA AND NO VOMITING
TREMOR: *
ORIENTATION AND CLOUDING OF SENSORIUM: ORIENTED AND CAN DO SERIAL ADDITIONS
AGITATION: *
TREMOR: *
NAUSEA AND VOMITING: NO NAUSEA AND NO VOMITING
DO YOU DRINK ALCOHOL: YES
PAROXYSMAL SWEATS: NO SWEAT VISIBLE
ANXIETY: MODERATELY ANXIOUS OR GUARDED, SO ANXIETY IS INFERRED
TREMOR: TREMOR NOT VISIBLE BUT CAN BE FELT, FINGERTIP TO FINGERTIP
VISUAL DISTURBANCES: MODERATE SENSITIVITY
VISUAL DISTURBANCES: NOT PRESENT
AUDITORY DISTURBANCES: VERY MILD HARSHNESS OR ABILITY TO FRIGHTEN
TOTAL SCORE: 14
TREMOR: TREMOR NOT VISIBLE BUT CAN BE FELT, FINGERTIP TO FINGERTIP
ANXIETY: MILDLY ANXIOUS
TREMOR: NO TREMOR
NAUSEA AND VOMITING: NO NAUSEA AND NO VOMITING
HEADACHE, FULLNESS IN HEAD: NOT PRESENT
TOTAL SCORE: 12
VISUAL DISTURBANCES: NOT PRESENT
NAUSEA AND VOMITING: NO NAUSEA AND NO VOMITING
HEADACHE, FULLNESS IN HEAD: MODERATE
HEADACHE, FULLNESS IN HEAD: MODERATE
TOTAL SCORE: 11
TOTAL SCORE: 14
VISUAL DISTURBANCES: NOT PRESENT
HEADACHE, FULLNESS IN HEAD: MILD
VISUAL DISTURBANCES: NOT PRESENT
AGITATION: *
TREMOR: TREMOR NOT VISIBLE BUT CAN BE FELT, FINGERTIP TO FINGERTIP
AGITATION: MODERATELY FIDGETY AND RESTLESS
TOTAL SCORE: 16
PAROXYSMAL SWEATS: BARELY PERCEPTIBLE SWEATING. PALMS MOIST
TREMOR: MODERATE TREMOR WITH ARMS EXTENDED
ORIENTATION AND CLOUDING OF SENSORIUM: DISORIENTED FOR PLACE AND / OR PERSON
ANXIETY: *
ORIENTATION AND CLOUDING OF SENSORIUM: DISORIENTED FOR PLACE AND / OR PERSON
HEADACHE, FULLNESS IN HEAD: NOT PRESENT
REASON UNABLE TO ASSESS: INTUBATED
NAUSEA AND VOMITING: NO NAUSEA AND NO VOMITING
TOTAL SCORE: 11
HEADACHE, FULLNESS IN HEAD: NOT PRESENT
AGITATION: *
ANXIETY: MODERATELY ANXIOUS OR GUARDED, SO ANXIETY IS INFERRED
VISUAL DISTURBANCES: MODERATE SENSITIVITY
ANXIETY: *
ANXIETY: MODERATELY ANXIOUS OR GUARDED, SO ANXIETY IS INFERRED
VISUAL DISTURBANCES: MILD SENSITIVITY
AGITATION: *
ORIENTATION AND CLOUDING OF SENSORIUM: DISORIENTED FOR PLACE AND / OR PERSON
TREMOR: *
HEADACHE, FULLNESS IN HEAD: VERY MILD
ORIENTATION AND CLOUDING OF SENSORIUM: DATE DISORIENTATION BY MORE THAN TWO CALENDAR DAYS
TOTAL SCORE: VERY MILD ITCHING, PINS AND NEEDLES SENSATION, BURNING OR NUMBNESS
NAUSEA AND VOMITING: NO NAUSEA AND NO VOMITING
ORIENTATION AND CLOUDING OF SENSORIUM: DISORIENTED FOR PLACE AND / OR PERSON
NAUSEA AND VOMITING: NO NAUSEA AND NO VOMITING
VISUAL DISTURBANCES: NOT PRESENT
HEADACHE, FULLNESS IN HEAD: NOT PRESENT
HEADACHE, FULLNESS IN HEAD: NOT PRESENT
TREMOR: NO TREMOR
ORIENTATION AND CLOUDING OF SENSORIUM: DISORIENTED FOR PLACE AND / OR PERSON
AUDITORY DISTURBANCES: NOT PRESENT
EVER FELT BAD OR GUILTY ABOUT YOUR DRINKING: YES
VISUAL DISTURBANCES: MODERATE SENSITIVITY
TOTAL SCORE: 4
AGITATION: MODERATELY FIDGETY AND RESTLESS
AGITATION: *
TOTAL SCORE: 17
PAROXYSMAL SWEATS: BARELY PERCEPTIBLE SWEATING. PALMS MOIST
PAROXYSMAL SWEATS: BARELY PERCEPTIBLE SWEATING. PALMS MOIST
AUDITORY DISTURBANCES: MODERATE HARSHNESS OR ABILITY TO FRIGHTEN
ANXIETY: *
AUDITORY DISTURBANCES: NOT PRESENT
NAUSEA AND VOMITING: NO NAUSEA AND NO VOMITING
TOTAL SCORE: 4
TREMOR: NO TREMOR
AGITATION: MODERATELY FIDGETY AND RESTLESS
HEADACHE, FULLNESS IN HEAD: MILD
TREMOR: TREMOR NOT VISIBLE BUT CAN BE FELT, FINGERTIP TO FINGERTIP
HEADACHE, FULLNESS IN HEAD: NOT PRESENT
ORIENTATION AND CLOUDING OF SENSORIUM: DISORIENTED FOR PLACE AND / OR PERSON
NAUSEA AND VOMITING: NO NAUSEA AND NO VOMITING
ORIENTATION AND CLOUDING OF SENSORIUM: DATE DISORIENTATION BY MORE THAN TWO CALENDAR DAYS
TREMOR: NO TREMOR
AGITATION: *
TOTAL SCORE: 15
HEADACHE, FULLNESS IN HEAD: MILD
HEADACHE, FULLNESS IN HEAD: NOT PRESENT
AGITATION: MODERATELY FIDGETY AND RESTLESS
TOTAL SCORE: 23
ANXIETY: MODERATELY ANXIOUS OR GUARDED, SO ANXIETY IS INFERRED
ORIENTATION AND CLOUDING OF SENSORIUM: DISORIENTED FOR PLACE AND / OR PERSON
PAROXYSMAL SWEATS: BARELY PERCEPTIBLE SWEATING. PALMS MOIST
NAUSEA AND VOMITING: NO NAUSEA AND NO VOMITING
AUDITORY DISTURBANCES: NOT PRESENT
TOTAL SCORE: 4
AGITATION: SOMEWHAT MORE THAN NORMAL ACTIVITY
ANXIETY: *
TOTAL SCORE: 10
ORIENTATION AND CLOUDING OF SENSORIUM: ORIENTED AND CAN DO SERIAL ADDITIONS
AGITATION: *
HEADACHE, FULLNESS IN HEAD: MILD
NAUSEA AND VOMITING: MILD NAUSEA WITH NO VOMITING
AUDITORY DISTURBANCES: NOT PRESENT
HEADACHE, FULLNESS IN HEAD: NOT PRESENT
NAUSEA AND VOMITING: NO NAUSEA AND NO VOMITING
HEADACHE, FULLNESS IN HEAD: NOT PRESENT
TREMOR: NO TREMOR
TREMOR: *
ORIENTATION AND CLOUDING OF SENSORIUM: DATE DISORIENTATION BY NO MORE THAN TWO CALENDAR DAYS
VISUAL DISTURBANCES: MODERATE SENSITIVITY
ORIENTATION AND CLOUDING OF SENSORIUM: DATE DISORIENTATION BY MORE THAN TWO CALENDAR DAYS
TOTAL SCORE: VERY MILD ITCHING, PINS AND NEEDLES SENSATION, BURNING OR NUMBNESS
ORIENTATION AND CLOUDING OF SENSORIUM: DISORIENTED FOR PLACE AND / OR PERSON
TOTAL SCORE: 22
NAUSEA AND VOMITING: NO NAUSEA AND NO VOMITING
HAVE PEOPLE ANNOYED YOU BY CRITICIZING YOUR DRINKING: YES
AUDITORY DISTURBANCES: NOT PRESENT
PAROXYSMAL SWEATS: NO SWEAT VISIBLE
TREMOR: NO TREMOR
ORIENTATION AND CLOUDING OF SENSORIUM: DISORIENTED FOR PLACE AND / OR PERSON
TOTAL SCORE: 4
AUDITORY DISTURBANCES: MILD HARSHNESS OR ABILITY TO FRIGHTEN
ORIENTATION AND CLOUDING OF SENSORIUM: DISORIENTED FOR PLACE AND / OR PERSON
AGITATION: *
AUDITORY DISTURBANCES: MILD HARSHNESS OR ABILITY TO FRIGHTEN
ANXIETY: MILDLY ANXIOUS
AGITATION: SOMEWHAT MORE THAN NORMAL ACTIVITY
AGITATION: *
TOTAL SCORE: 3
ORIENTATION AND CLOUDING OF SENSORIUM: ORIENTED AND CAN DO SERIAL ADDITIONS
VISUAL DISTURBANCES: SEVERE HALLUCINATIONS
TOTAL SCORE: 10
DOES PATIENT WANT TO STOP DRINKING: YES
NAUSEA AND VOMITING: MILD NAUSEA WITH NO VOMITING
AGITATION: *
ANXIETY: MODERATELY ANXIOUS OR GUARDED, SO ANXIETY IS INFERRED
EVER_SMOKED: YES
ON A TYPICAL DAY WHEN YOU DRINK ALCOHOL HOW MANY DRINKS DO YOU HAVE: 6
AUDITORY DISTURBANCES: VERY MILD HARSHNESS OR ABILITY TO FRIGHTEN
TREMOR: TREMOR NOT VISIBLE BUT CAN BE FELT, FINGERTIP TO FINGERTIP
TREMOR: NO TREMOR
PAROXYSMAL SWEATS: BARELY PERCEPTIBLE SWEATING. PALMS MOIST
NAUSEA AND VOMITING: NO NAUSEA AND NO VOMITING
AGITATION: *
PAROXYSMAL SWEATS: NO SWEAT VISIBLE
TOTAL SCORE: 4
TREMOR: TREMOR NOT VISIBLE BUT CAN BE FELT, FINGERTIP TO FINGERTIP
VISUAL DISTURBANCES: NOT PRESENT
ORIENTATION AND CLOUDING OF SENSORIUM: DISORIENTED FOR PLACE AND / OR PERSON
VISUAL DISTURBANCES: MILD SENSITIVITY
VISUAL DISTURBANCES: NOT PRESENT
PAROXYSMAL SWEATS: BARELY PERCEPTIBLE SWEATING. PALMS MOIST
TOTAL SCORE: 15
ANXIETY: *
TREMOR: NO TREMOR
HEADACHE, FULLNESS IN HEAD: NOT PRESENT
AGITATION: MODERATELY FIDGETY AND RESTLESS
AUDITORY DISTURBANCES: MILD HARSHNESS OR ABILITY TO FRIGHTEN
AGITATION: *
TREMOR: NO TREMOR
HEADACHE, FULLNESS IN HEAD: NOT PRESENT
TREMOR: TREMOR NOT VISIBLE BUT CAN BE FELT, FINGERTIP TO FINGERTIP
ANXIETY: *
NAUSEA AND VOMITING: MILD NAUSEA WITH NO VOMITING
AGITATION: *
EVER HAD A DRINK FIRST THING IN THE MORNING TO STEADY YOUR NERVES TO GET RID OF A HANGOVER: YES
EVER FELT BAD OR GUILTY ABOUT YOUR DRINKING: YES
TOTAL SCORE: 16
ORIENTATION AND CLOUDING OF SENSORIUM: DATE DISORIENTATION BY MORE THAN TWO CALENDAR DAYS
PAROXYSMAL SWEATS: BARELY PERCEPTIBLE SWEATING. PALMS MOIST
AUDITORY DISTURBANCES: MODERATELY SEVERE HALLUCINATIONS
TREMOR: *
VISUAL DISTURBANCES: EXTREMELY SEVERE HALLUCINATIONS
NAUSEA AND VOMITING: NO NAUSEA AND NO VOMITING
TOTAL SCORE: 17
VISUAL DISTURBANCES: MILD SENSITIVITY
AGITATION: *
PAROXYSMAL SWEATS: BARELY PERCEPTIBLE SWEATING. PALMS MOIST
DO YOU DRINK ALCOHOL: YES
TOTAL SCORE: 20
TREMOR: TREMOR NOT VISIBLE BUT CAN BE FELT, FINGERTIP TO FINGERTIP
HEADACHE, FULLNESS IN HEAD: NOT PRESENT
AUDITORY DISTURBANCES: NOT PRESENT
ANXIETY: MILDLY ANXIOUS
NAUSEA AND VOMITING: *
TOTAL SCORE: 26
CONSUMPTION TOTAL: POSITIVE
AGITATION: MODERATELY FIDGETY AND RESTLESS
TOTAL SCORE: SEVERE HALLUCINATIONS
VISUAL DISTURBANCES: MODERATE SENSITIVITY
AGITATION: *
HEADACHE, FULLNESS IN HEAD: NOT PRESENT
TOTAL SCORE: 14
ANXIETY: NO ANXIETY (AT EASE)
ORIENTATION AND CLOUDING OF SENSORIUM: DATE DISORIENTATION BY MORE THAN TWO CALENDAR DAYS
AGITATION: MODERATELY FIDGETY AND RESTLESS
TOTAL SCORE: 16
TOTAL SCORE: 25
EVER HAD A DRINK FIRST THING IN THE MORNING TO STEADY YOUR NERVES TO GET RID OF A HANGOVER: YES
TOTAL SCORE: 20
AGITATION: SOMEWHAT MORE THAN NORMAL ACTIVITY
VISUAL DISTURBANCES: MODERATELY SEVERE HALLUCINATIONS
ORIENTATION AND CLOUDING OF SENSORIUM: DISORIENTED FOR PLACE AND / OR PERSON
NAUSEA AND VOMITING: NO NAUSEA AND NO VOMITING
TREMOR: *
PAROXYSMAL SWEATS: BARELY PERCEPTIBLE SWEATING. PALMS MOIST
TREMOR: TREMOR NOT VISIBLE BUT CAN BE FELT, FINGERTIP TO FINGERTIP
TOTAL SCORE: 13
TREMOR: NO TREMOR
PAROXYSMAL SWEATS: NO SWEAT VISIBLE
TOTAL SCORE: 17
TOTAL SCORE: 11
TREMOR: NO TREMOR
PAROXYSMAL SWEATS: *
HEADACHE, FULLNESS IN HEAD: NOT PRESENT
AUDITORY DISTURBANCES: NOT PRESENT
AGITATION: *
TREMOR: *
AGITATION: *
TREMOR: NO TREMOR
VISUAL DISTURBANCES: MILD SENSITIVITY
NAUSEA AND VOMITING: MILD NAUSEA WITH NO VOMITING
TREMOR: *
ORIENTATION AND CLOUDING OF SENSORIUM: DISORIENTED FOR PLACE AND / OR PERSON
ANXIETY: *
ANXIETY: *
VISUAL DISTURBANCES: MODERATE SENSITIVITY
ORIENTATION AND CLOUDING OF SENSORIUM: DISORIENTED FOR PLACE AND / OR PERSON
TOTAL SCORE: 15
TREMOR: NO TREMOR
AGITATION: SOMEWHAT MORE THAN NORMAL ACTIVITY
ANXIETY: *
AGITATION: *
EVER HAD A DRINK FIRST THING IN THE MORNING TO STEADY YOUR NERVES TO GET RID OF A HANGOVER: YES
TREMOR: TREMOR NOT VISIBLE BUT CAN BE FELT, FINGERTIP TO FINGERTIP
ANXIETY: *
ANXIETY: *
AUDITORY DISTURBANCES: NOT PRESENT
TREMOR: NO TREMOR
ORIENTATION AND CLOUDING OF SENSORIUM: ORIENTED AND CAN DO SERIAL ADDITIONS
TREMOR: NO TREMOR
AUDITORY DISTURBANCES: MODERATELY SEVERE HALLUCINATIONS
AGITATION: *
VISUAL DISTURBANCES: NOT PRESENT
AGITATION: *
ORIENTATION AND CLOUDING OF SENSORIUM: DISORIENTED FOR PLACE AND / OR PERSON
NAUSEA AND VOMITING: NO NAUSEA AND NO VOMITING
ANXIETY: NO ANXIETY (AT EASE)
NAUSEA AND VOMITING: NO NAUSEA AND NO VOMITING
TOTAL SCORE: VERY MILD ITCHING, PINS AND NEEDLES SENSATION, BURNING OR NUMBNESS
TREMOR: NO TREMOR
TOTAL SCORE: 4
PAROXYSMAL SWEATS: BARELY PERCEPTIBLE SWEATING. PALMS MOIST
HEADACHE, FULLNESS IN HEAD: NOT PRESENT
AUDITORY DISTURBANCES: NOT PRESENT
ORIENTATION AND CLOUDING OF SENSORIUM: DISORIENTED FOR PLACE AND / OR PERSON
TREMOR: TREMOR NOT VISIBLE BUT CAN BE FELT, FINGERTIP TO FINGERTIP
EVER HAD A DRINK FIRST THING IN THE MORNING TO STEADY YOUR NERVES TO GET RID OF A HANGOVER: YES
ANXIETY: *
TOTAL SCORE: 22
ORIENTATION AND CLOUDING OF SENSORIUM: DATE DISORIENTATION BY MORE THAN TWO CALENDAR DAYS
CONSUMPTION TOTAL: POSITIVE
ANXIETY: *
PAROXYSMAL SWEATS: *
ORIENTATION AND CLOUDING OF SENSORIUM: ORIENTED AND CAN DO SERIAL ADDITIONS
AUDITORY DISTURBANCES: NOT PRESENT
AGITATION: *
TREMOR: *
TREMOR: *
HEADACHE, FULLNESS IN HEAD: NOT PRESENT
HEADACHE, FULLNESS IN HEAD: NOT PRESENT
NAUSEA AND VOMITING: NO NAUSEA AND NO VOMITING
TREMOR: *
TOTAL SCORE: 9
PAROXYSMAL SWEATS: BARELY PERCEPTIBLE SWEATING. PALMS MOIST
TOTAL SCORE: 26
TOTAL SCORE: 4
NAUSEA AND VOMITING: NO NAUSEA AND NO VOMITING
PAROXYSMAL SWEATS: BARELY PERCEPTIBLE SWEATING. PALMS MOIST
ANXIETY: *
HEADACHE, FULLNESS IN HEAD: VERY MILD
AGITATION: *
TREMOR: NO TREMOR
ORIENTATION AND CLOUDING OF SENSORIUM: ORIENTED AND CAN DO SERIAL ADDITIONS
AUDITORY DISTURBANCES: NOT PRESENT
AUDITORY DISTURBANCES: NOT PRESENT
DO YOU DRINK ALCOHOL: YES
ON A TYPICAL DAY WHEN YOU DRINK ALCOHOL HOW MANY DRINKS DO YOU HAVE: 6
EVER_SMOKED: YES
TREMOR: *
TOTAL SCORE: 4
TREMOR: *
VISUAL DISTURBANCES: MODERATE SENSITIVITY
AGITATION: *
AUDITORY DISTURBANCES: MILD HARSHNESS OR ABILITY TO FRIGHTEN
AGITATION: MODERATELY FIDGETY AND RESTLESS
HEADACHE, FULLNESS IN HEAD: NOT PRESENT
EVER HAD A DRINK FIRST THING IN THE MORNING TO STEADY YOUR NERVES TO GET RID OF A HANGOVER: YES
PAROXYSMAL SWEATS: NO SWEAT VISIBLE
AUDITORY DISTURBANCES: NOT PRESENT
PAROXYSMAL SWEATS: NO SWEAT VISIBLE
TREMOR: NO TREMOR
NAUSEA AND VOMITING: NO NAUSEA AND NO VOMITING
TOTAL SCORE: 4
PAROXYSMAL SWEATS: NO SWEAT VISIBLE
ORIENTATION AND CLOUDING OF SENSORIUM: ORIENTED AND CAN DO SERIAL ADDITIONS
VISUAL DISTURBANCES: NOT PRESENT
NAUSEA AND VOMITING: NO NAUSEA AND NO VOMITING
ANXIETY: *
AGITATION: MODERATELY FIDGETY AND RESTLESS
VISUAL DISTURBANCES: MILD SENSITIVITY
ON A TYPICAL DAY WHEN YOU DRINK ALCOHOL HOW MANY DRINKS DO YOU HAVE: 6
HEADACHE, FULLNESS IN HEAD: NOT PRESENT
ANXIETY: MILDLY ANXIOUS
ANXIETY: *
ORIENTATION AND CLOUDING OF SENSORIUM: DISORIENTED FOR PLACE AND / OR PERSON
NAUSEA AND VOMITING: NO NAUSEA AND NO VOMITING
TOTAL SCORE: 10
ORIENTATION AND CLOUDING OF SENSORIUM: DISORIENTED FOR PLACE AND / OR PERSON
ANXIETY: *
TOTAL SCORE: 10
HEADACHE, FULLNESS IN HEAD: NOT PRESENT
TOTAL SCORE: 12
ANXIETY: MODERATELY ANXIOUS OR GUARDED, SO ANXIETY IS INFERRED
TREMOR: *
ORIENTATION AND CLOUDING OF SENSORIUM: DISORIENTED FOR PLACE AND / OR PERSON
TREMOR: TREMOR NOT VISIBLE BUT CAN BE FELT, FINGERTIP TO FINGERTIP
VISUAL DISTURBANCES: MODERATELY SEVERE HALLUCINATIONS
PAROXYSMAL SWEATS: NO SWEAT VISIBLE
ANXIETY: NO ANXIETY (AT EASE)
AUDITORY DISTURBANCES: VERY MILD HARSHNESS OR ABILITY TO FRIGHTEN
TOTAL SCORE: 15
TOTAL SCORE: 12
ANXIETY: MILDLY ANXIOUS
PAROXYSMAL SWEATS: BARELY PERCEPTIBLE SWEATING. PALMS MOIST
AUDITORY DISTURBANCES: NOT PRESENT
AGITATION: *
ANXIETY: *
HEADACHE, FULLNESS IN HEAD: NOT PRESENT
TREMOR: NO TREMOR
AUDITORY DISTURBANCES: MILD HARSHNESS OR ABILITY TO FRIGHTEN
VISUAL DISTURBANCES: NOT PRESENT
ORIENTATION AND CLOUDING OF SENSORIUM: DISORIENTED FOR PLACE AND / OR PERSON
HEADACHE, FULLNESS IN HEAD: NOT PRESENT
EVER FELT BAD OR GUILTY ABOUT YOUR DRINKING: YES
NAUSEA AND VOMITING: NO NAUSEA AND NO VOMITING
VISUAL DISTURBANCES: NOT PRESENT
VISUAL DISTURBANCES: MILD SENSITIVITY
HEADACHE, FULLNESS IN HEAD: NOT PRESENT
VISUAL DISTURBANCES: MODERATELY SEVERE HALLUCINATIONS
ORIENTATION AND CLOUDING OF SENSORIUM: CANNOT DO SERIAL ADDITIONS OR IS UNCERTAIN ABOUT DATE
TREMOR: TREMOR NOT VISIBLE BUT CAN BE FELT, FINGERTIP TO FINGERTIP
ORIENTATION AND CLOUDING OF SENSORIUM: DISORIENTED FOR PLACE AND / OR PERSON
TOTAL SCORE: 14
ORIENTATION AND CLOUDING OF SENSORIUM: DISORIENTED FOR PLACE AND / OR PERSON
NAUSEA AND VOMITING: NO NAUSEA AND NO VOMITING
ORIENTATION AND CLOUDING OF SENSORIUM: DISORIENTED FOR PLACE AND / OR PERSON
HEADACHE, FULLNESS IN HEAD: NOT PRESENT
TREMOR: TREMOR NOT VISIBLE BUT CAN BE FELT, FINGERTIP TO FINGERTIP
AGITATION: SOMEWHAT MORE THAN NORMAL ACTIVITY
PAROXYSMAL SWEATS: NO SWEAT VISIBLE
AUDITORY DISTURBANCES: NOT PRESENT
HAVE YOU EVER FELT YOU SHOULD CUT DOWN ON YOUR DRINKING: YES
AVERAGE NUMBER OF DAYS PER WEEK YOU HAVE A DRINK CONTAINING ALCOHOL: 1
ANXIETY: MILDLY ANXIOUS
EVER HAD A DRINK FIRST THING IN THE MORNING TO STEADY YOUR NERVES TO GET RID OF A HANGOVER: YES
AGITATION: *
PAROXYSMAL SWEATS: NO SWEAT VISIBLE
PAROXYSMAL SWEATS: BARELY PERCEPTIBLE SWEATING. PALMS MOIST
AGITATION: *
TREMOR: TREMOR NOT VISIBLE BUT CAN BE FELT, FINGERTIP TO FINGERTIP
TOTAL SCORE: 26
ANXIETY: *
ORIENTATION AND CLOUDING OF SENSORIUM: DISORIENTED FOR PLACE AND / OR PERSON
VISUAL DISTURBANCES: VERY MILD SENSITIVITY
ANXIETY: MODERATELY ANXIOUS OR GUARDED, SO ANXIETY IS INFERRED
AUDITORY DISTURBANCES: NOT PRESENT
VISUAL DISTURBANCES: VERY MILD SENSITIVITY
AUDITORY DISTURBANCES: VERY MILD HARSHNESS OR ABILITY TO FRIGHTEN
PAROXYSMAL SWEATS: BARELY PERCEPTIBLE SWEATING. PALMS MOIST
PAROXYSMAL SWEATS: BARELY PERCEPTIBLE SWEATING. PALMS MOIST
AUDITORY DISTURBANCES: NOT PRESENT
TOTAL SCORE: 15
ON A TYPICAL DAY WHEN YOU DRINK ALCOHOL HOW MANY DRINKS DO YOU HAVE: 6
ORIENTATION AND CLOUDING OF SENSORIUM: DATE DISORIENTATION BY MORE THAN TWO CALENDAR DAYS
TREMOR: *
AGITATION: *
NAUSEA AND VOMITING: *
TOTAL SCORE: 22
DOES PATIENT WANT TO STOP DRINKING: YES
NAUSEA AND VOMITING: NO NAUSEA AND NO VOMITING
AGITATION: *
TOTAL SCORE: 15
VISUAL DISTURBANCES: MODERATE SENSITIVITY
HEADACHE, FULLNESS IN HEAD: NOT PRESENT
AUDITORY DISTURBANCES: NOT PRESENT
TOTAL SCORE: 19
TREMOR: TREMOR NOT VISIBLE BUT CAN BE FELT, FINGERTIP TO FINGERTIP
TREMOR: *
AUDITORY DISTURBANCES: NOT PRESENT
VISUAL DISTURBANCES: EXTREMELY SEVERE HALLUCINATIONS
AGITATION: *
PAROXYSMAL SWEATS: BARELY PERCEPTIBLE SWEATING. PALMS MOIST
HEADACHE, FULLNESS IN HEAD: NOT PRESENT
AUDITORY DISTURBANCES: NOT PRESENT
NAUSEA AND VOMITING: NO NAUSEA AND NO VOMITING
AUDITORY DISTURBANCES: NOT PRESENT
CONSUMPTION TOTAL: POSITIVE
PAROXYSMAL SWEATS: NO SWEAT VISIBLE
ORIENTATION AND CLOUDING OF SENSORIUM: DISORIENTED FOR PLACE AND / OR PERSON
TREMOR: *
VISUAL DISTURBANCES: MODERATE SENSITIVITY
VISUAL DISTURBANCES: NOT PRESENT
NAUSEA AND VOMITING: NO NAUSEA AND NO VOMITING
AUDITORY DISTURBANCES: NOT PRESENT
TOTAL SCORE: 4
TOTAL SCORE: 4
PAROXYSMAL SWEATS: NO SWEAT VISIBLE
HEADACHE, FULLNESS IN HEAD: MODERATE
AUDITORY DISTURBANCES: NOT PRESENT
ORIENTATION AND CLOUDING OF SENSORIUM: DISORIENTED FOR PLACE AND / OR PERSON
TOTAL SCORE: 4
ORIENTATION AND CLOUDING OF SENSORIUM: DISORIENTED FOR PLACE AND / OR PERSON
NAUSEA AND VOMITING: NO NAUSEA AND NO VOMITING
TOTAL SCORE: 4
TACTILE DISTURBANCES: VERY MILD ITCHING, PINS AND NEEDLES SENSATION, BURNING OR NUMBNESS
PAROXYSMAL SWEATS: NO SWEAT VISIBLE
AGITATION: NORMAL ACTIVITY
ORIENTATION AND CLOUDING OF SENSORIUM: DISORIENTED FOR PLACE AND / OR PERSON
AGITATION: *
HEADACHE, FULLNESS IN HEAD: NOT PRESENT
PAROXYSMAL SWEATS: NO SWEAT VISIBLE
ORIENTATION AND CLOUDING OF SENSORIUM: ORIENTED AND CAN DO SERIAL ADDITIONS
VISUAL DISTURBANCES: VERY MILD SENSITIVITY
VISUAL DISTURBANCES: NOT PRESENT
AGITATION: MODERATELY FIDGETY AND RESTLESS
TOTAL SCORE: 15
NAUSEA AND VOMITING: NO NAUSEA AND NO VOMITING
HEADACHE, FULLNESS IN HEAD: NOT PRESENT
ORIENTATION AND CLOUDING OF SENSORIUM: DISORIENTED FOR PLACE AND / OR PERSON
DOES PATIENT WANT TO TALK TO SOMEONE ABOUT QUITTING: YES
DOES PATIENT WANT TO TALK TO SOMEONE ABOUT QUITTING: YES
PAROXYSMAL SWEATS: BARELY PERCEPTIBLE SWEATING. PALMS MOIST
AUDITORY DISTURBANCES: NOT PRESENT
TOTAL SCORE: 4
HEADACHE, FULLNESS IN HEAD: NOT PRESENT
ORIENTATION AND CLOUDING OF SENSORIUM: DATE DISORIENTATION BY MORE THAN TWO CALENDAR DAYS
PAROXYSMAL SWEATS: BARELY PERCEPTIBLE SWEATING. PALMS MOIST
HEADACHE, FULLNESS IN HEAD: NOT PRESENT
ORIENTATION AND CLOUDING OF SENSORIUM: DATE DISORIENTATION BY MORE THAN TWO CALENDAR DAYS
TREMOR: NO TREMOR
TREMOR: NO TREMOR
VISUAL DISTURBANCES: NOT PRESENT
ANXIETY: MODERATELY ANXIOUS OR GUARDED, SO ANXIETY IS INFERRED
NAUSEA AND VOMITING: NO NAUSEA AND NO VOMITING
AGITATION: *
PAROXYSMAL SWEATS: *
AGITATION: MODERATELY FIDGETY AND RESTLESS
VISUAL DISTURBANCES: MILD SENSITIVITY
NAUSEA AND VOMITING: NO NAUSEA AND NO VOMITING
ORIENTATION AND CLOUDING OF SENSORIUM: DISORIENTED FOR PLACE AND / OR PERSON
DOES PATIENT WANT TO TALK TO SOMEONE ABOUT QUITTING: YES
PAROXYSMAL SWEATS: NO SWEAT VISIBLE
TREMOR: NO TREMOR
PAROXYSMAL SWEATS: *
ANXIETY: *
TOTAL SCORE: 17
TOTAL SCORE: 26
NAUSEA AND VOMITING: NO NAUSEA AND NO VOMITING
NAUSEA AND VOMITING: NO NAUSEA AND NO VOMITING
TREMOR: TREMOR NOT VISIBLE BUT CAN BE FELT, FINGERTIP TO FINGERTIP
HEADACHE, FULLNESS IN HEAD: NOT PRESENT
NAUSEA AND VOMITING: NO NAUSEA AND NO VOMITING
NAUSEA AND VOMITING: NO NAUSEA AND NO VOMITING
PAROXYSMAL SWEATS: BARELY PERCEPTIBLE SWEATING. PALMS MOIST
EVER FELT BAD OR GUILTY ABOUT YOUR DRINKING: YES
AGITATION: *
PAROXYSMAL SWEATS: *
VISUAL DISTURBANCES: NOT PRESENT
TOTAL SCORE: 22
HEADACHE, FULLNESS IN HEAD: VERY MILD
ORIENTATION AND CLOUDING OF SENSORIUM: DATE DISORIENTATION BY NO MORE THAN TWO CALENDAR DAYS
VISUAL DISTURBANCES: NOT PRESENT
ANXIETY: MODERATELY ANXIOUS OR GUARDED, SO ANXIETY IS INFERRED
AUDITORY DISTURBANCES: MODERATELY SEVERE HALLUCINATIONS
TOTAL SCORE: 17
ORIENTATION AND CLOUDING OF SENSORIUM: DATE DISORIENTATION BY MORE THAN TWO CALENDAR DAYS
AUDITORY DISTURBANCES: MILD HARSHNESS OR ABILITY TO FRIGHTEN
HEADACHE, FULLNESS IN HEAD: NOT PRESENT
TOTAL SCORE: 17
ORIENTATION AND CLOUDING OF SENSORIUM: DISORIENTED FOR PLACE AND / OR PERSON
ORIENTATION AND CLOUDING OF SENSORIUM: DATE DISORIENTATION BY MORE THAN TWO CALENDAR DAYS
ORIENTATION AND CLOUDING OF SENSORIUM: DATE DISORIENTATION BY MORE THAN TWO CALENDAR DAYS
NAUSEA AND VOMITING: *
VISUAL DISTURBANCES: VERY MILD SENSITIVITY
ORIENTATION AND CLOUDING OF SENSORIUM: DISORIENTED FOR PLACE AND / OR PERSON
AUDITORY DISTURBANCES: NOT PRESENT
VISUAL DISTURBANCES: NOT PRESENT
NAUSEA AND VOMITING: NO NAUSEA AND NO VOMITING
NAUSEA AND VOMITING: NO NAUSEA AND NO VOMITING
TREMOR: NO TREMOR
NAUSEA AND VOMITING: NO NAUSEA AND NO VOMITING
PAROXYSMAL SWEATS: NO SWEAT VISIBLE
HEADACHE, FULLNESS IN HEAD: NOT PRESENT
HEADACHE, FULLNESS IN HEAD: NOT PRESENT
ANXIETY: MODERATELY ANXIOUS OR GUARDED, SO ANXIETY IS INFERRED
VISUAL DISTURBANCES: NOT PRESENT
PAROXYSMAL SWEATS: NO SWEAT VISIBLE
VISUAL DISTURBANCES: NOT PRESENT
AGITATION: *
AGITATION: *
AGITATION: NORMAL ACTIVITY
HEADACHE, FULLNESS IN HEAD: NOT PRESENT
NAUSEA AND VOMITING: NO NAUSEA AND NO VOMITING
TOTAL SCORE: 5
HEADACHE, FULLNESS IN HEAD: NOT PRESENT
VISUAL DISTURBANCES: VERY MILD SENSITIVITY
ORIENTATION AND CLOUDING OF SENSORIUM: DATE DISORIENTATION BY MORE THAN TWO CALENDAR DAYS
VISUAL DISTURBANCES: MODERATELY SEVERE HALLUCINATIONS
AGITATION: *
HEADACHE, FULLNESS IN HEAD: MILD
PAROXYSMAL SWEATS: NO SWEAT VISIBLE
HEADACHE, FULLNESS IN HEAD: NOT PRESENT
AUDITORY DISTURBANCES: VERY MILD HARSHNESS OR ABILITY TO FRIGHTEN
AUDITORY DISTURBANCES: NOT PRESENT
NAUSEA AND VOMITING: NO NAUSEA AND NO VOMITING
AGITATION: *
AUDITORY DISTURBANCES: MILD HARSHNESS OR ABILITY TO FRIGHTEN
AUDITORY DISTURBANCES: MILD HARSHNESS OR ABILITY TO FRIGHTEN
HEADACHE, FULLNESS IN HEAD: NOT PRESENT
PAROXYSMAL SWEATS: NO SWEAT VISIBLE
ANXIETY: *
VISUAL DISTURBANCES: NOT PRESENT
PAROXYSMAL SWEATS: BARELY PERCEPTIBLE SWEATING. PALMS MOIST
VISUAL DISTURBANCES: VERY MILD SENSITIVITY
TOTAL SCORE: 18
PAROXYSMAL SWEATS: NO SWEAT VISIBLE
AUDITORY DISTURBANCES: VERY MILD HARSHNESS OR ABILITY TO FRIGHTEN
PAROXYSMAL SWEATS: BARELY PERCEPTIBLE SWEATING. PALMS MOIST
AGITATION: *
AGITATION: SOMEWHAT MORE THAN NORMAL ACTIVITY
TREMOR: TREMOR NOT VISIBLE BUT CAN BE FELT, FINGERTIP TO FINGERTIP
HEADACHE, FULLNESS IN HEAD: NOT PRESENT
AVERAGE NUMBER OF DAYS PER WEEK YOU HAVE A DRINK CONTAINING ALCOHOL: 7
ANXIETY: *
NAUSEA AND VOMITING: MILD NAUSEA WITH NO VOMITING
ORIENTATION AND CLOUDING OF SENSORIUM: DATE DISORIENTATION BY MORE THAN TWO CALENDAR DAYS
TOTAL SCORE: 10
HOW MANY TIMES IN THE PAST YEAR HAVE YOU HAD 5 OR MORE DRINKS IN A DAY: 10
PAROXYSMAL SWEATS: BARELY PERCEPTIBLE SWEATING. PALMS MOIST
AUDITORY DISTURBANCES: VERY MILD HARSHNESS OR ABILITY TO FRIGHTEN
ORIENTATION AND CLOUDING OF SENSORIUM: DISORIENTED FOR PLACE AND / OR PERSON
AUDITORY DISTURBANCES: MILD HARSHNESS OR ABILITY TO FRIGHTEN
AUDITORY DISTURBANCES: NOT PRESENT
TREMOR: NO TREMOR
AUDITORY DISTURBANCES: NOT PRESENT
AUDITORY DISTURBANCES: NOT PRESENT
ORIENTATION AND CLOUDING OF SENSORIUM: DISORIENTED FOR PLACE AND / OR PERSON
ORIENTATION AND CLOUDING OF SENSORIUM: DISORIENTED FOR PLACE AND / OR PERSON
VISUAL DISTURBANCES: EXTREMELY SEVERE HALLUCINATIONS
ORIENTATION AND CLOUDING OF SENSORIUM: DISORIENTED FOR PLACE AND / OR PERSON
AUDITORY DISTURBANCES: VERY MILD HARSHNESS OR ABILITY TO FRIGHTEN
AUDITORY DISTURBANCES: NOT PRESENT
VISUAL DISTURBANCES: MODERATELY SEVERE HALLUCINATIONS
HEADACHE, FULLNESS IN HEAD: VERY MILD
HEADACHE, FULLNESS IN HEAD: NOT PRESENT
NAUSEA AND VOMITING: NO NAUSEA AND NO VOMITING
TOTAL SCORE: 10
ANXIETY: NO ANXIETY (AT EASE)
HEADACHE, FULLNESS IN HEAD: NOT PRESENT
NAUSEA AND VOMITING: NO NAUSEA AND NO VOMITING
TOTAL SCORE: 8
ANXIETY: *
ANXIETY: *
HAVE YOU EVER FELT YOU SHOULD CUT DOWN ON YOUR DRINKING: YES
HAVE PEOPLE ANNOYED YOU BY CRITICIZING YOUR DRINKING: YES
TOTAL SCORE: 26
ORIENTATION AND CLOUDING OF SENSORIUM: DATE DISORIENTATION BY MORE THAN TWO CALENDAR DAYS
VISUAL DISTURBANCES: VERY MILD SENSITIVITY
AGITATION: *
AUDITORY DISTURBANCES: NOT PRESENT
HEADACHE, FULLNESS IN HEAD: NOT PRESENT
NAUSEA AND VOMITING: NO NAUSEA AND NO VOMITING
ORIENTATION AND CLOUDING OF SENSORIUM: DATE DISORIENTATION BY MORE THAN TWO CALENDAR DAYS
AGITATION: MODERATELY FIDGETY AND RESTLESS
NAUSEA AND VOMITING: NO NAUSEA AND NO VOMITING
AGITATION: MODERATELY FIDGETY AND RESTLESS
HEADACHE, FULLNESS IN HEAD: MILD
VISUAL DISTURBANCES: MODERATELY SEVERE HALLUCINATIONS
HEADACHE, FULLNESS IN HEAD: NOT PRESENT
VISUAL DISTURBANCES: SEVERE HALLUCINATIONS
DO YOU DRINK ALCOHOL: YES
TREMOR: NO TREMOR
NAUSEA AND VOMITING: NO NAUSEA AND NO VOMITING
AGITATION: MODERATELY FIDGETY AND RESTLESS
ORIENTATION AND CLOUDING OF SENSORIUM: DISORIENTED FOR PLACE AND / OR PERSON
ORIENTATION AND CLOUDING OF SENSORIUM: ORIENTED AND CAN DO SERIAL ADDITIONS
ORIENTATION AND CLOUDING OF SENSORIUM: DATE DISORIENTATION BY MORE THAN TWO CALENDAR DAYS
ORIENTATION AND CLOUDING OF SENSORIUM: DATE DISORIENTATION BY MORE THAN TWO CALENDAR DAYS
HEADACHE, FULLNESS IN HEAD: NOT PRESENT
VISUAL DISTURBANCES: MODERATE SENSITIVITY
TREMOR: *
AGITATION: SOMEWHAT MORE THAN NORMAL ACTIVITY
HEADACHE, FULLNESS IN HEAD: MODERATE
PAROXYSMAL SWEATS: *
DOES PATIENT WANT TO STOP DRINKING: YES
NAUSEA AND VOMITING: *
AUDITORY DISTURBANCES: MILD HARSHNESS OR ABILITY TO FRIGHTEN
HEADACHE, FULLNESS IN HEAD: MODERATE
AUDITORY DISTURBANCES: VERY MILD HARSHNESS OR ABILITY TO FRIGHTEN
PAROXYSMAL SWEATS: BARELY PERCEPTIBLE SWEATING. PALMS MOIST
TREMOR: *
HEADACHE, FULLNESS IN HEAD: NOT PRESENT
PAROXYSMAL SWEATS: NO SWEAT VISIBLE
AUDITORY DISTURBANCES: NOT PRESENT
NAUSEA AND VOMITING: NO NAUSEA AND NO VOMITING
VISUAL DISTURBANCES: NOT PRESENT
ORIENTATION AND CLOUDING OF SENSORIUM: DISORIENTED FOR PLACE AND / OR PERSON
VISUAL DISTURBANCES: MODERATELY SEVERE HALLUCINATIONS
ORIENTATION AND CLOUDING OF SENSORIUM: DISORIENTED FOR PLACE AND / OR PERSON
HEADACHE, FULLNESS IN HEAD: MILD
ANXIETY: *
PAROXYSMAL SWEATS: NO SWEAT VISIBLE
AUDITORY DISTURBANCES: MODERATELY SEVERE HALLUCINATIONS
AGITATION: *
ANXIETY: *
PAROXYSMAL SWEATS: NO SWEAT VISIBLE
PAROXYSMAL SWEATS: NO SWEAT VISIBLE
EVER FELT BAD OR GUILTY ABOUT YOUR DRINKING: YES
ANXIETY: *
AGITATION: *
HEADACHE, FULLNESS IN HEAD: NOT PRESENT
AUDITORY DISTURBANCES: NOT PRESENT
VISUAL DISTURBANCES: VERY MILD SENSITIVITY
ANXIETY: NO ANXIETY (AT EASE)
HEADACHE, FULLNESS IN HEAD: NOT PRESENT
PAROXYSMAL SWEATS: BARELY PERCEPTIBLE SWEATING. PALMS MOIST
AUDITORY DISTURBANCES: MILD HARSHNESS OR ABILITY TO FRIGHTEN
VISUAL DISTURBANCES: VERY MILD SENSITIVITY
ORIENTATION AND CLOUDING OF SENSORIUM: DATE DISORIENTATION BY MORE THAN TWO CALENDAR DAYS
VISUAL DISTURBANCES: EXTREMELY SEVERE HALLUCINATIONS
TOTAL SCORE: 25
PAROXYSMAL SWEATS: NO SWEAT VISIBLE
ANXIETY: *
TREMOR: NO TREMOR
TOTAL SCORE: 14
AVERAGE NUMBER OF DAYS PER WEEK YOU HAVE A DRINK CONTAINING ALCOHOL: 1
AUDITORY DISTURBANCES: NOT PRESENT
NAUSEA AND VOMITING: NO NAUSEA AND NO VOMITING
PAROXYSMAL SWEATS: *
NAUSEA AND VOMITING: NO NAUSEA AND NO VOMITING
TREMOR: *
ANXIETY: *
PAROXYSMAL SWEATS: BARELY PERCEPTIBLE SWEATING. PALMS MOIST
HEADACHE, FULLNESS IN HEAD: NOT PRESENT
PAROXYSMAL SWEATS: BARELY PERCEPTIBLE SWEATING. PALMS MOIST
AUDITORY DISTURBANCES: VERY MILD HARSHNESS OR ABILITY TO FRIGHTEN
AUDITORY DISTURBANCES: MILD HARSHNESS OR ABILITY TO FRIGHTEN
PAROXYSMAL SWEATS: NO SWEAT VISIBLE
TOTAL SCORE: 20
ANXIETY: MODERATELY ANXIOUS OR GUARDED, SO ANXIETY IS INFERRED
AUDITORY DISTURBANCES: MILD HARSHNESS OR ABILITY TO FRIGHTEN
AUDITORY DISTURBANCES: MODERATELY SEVERE HALLUCINATIONS
ANXIETY: MODERATELY ANXIOUS OR GUARDED, SO ANXIETY IS INFERRED
AGITATION: *
PAROXYSMAL SWEATS: NO SWEAT VISIBLE
HAVE YOU EVER FELT YOU SHOULD CUT DOWN ON YOUR DRINKING: YES
TOTAL SCORE: 17
TOTAL SCORE: 3
ORIENTATION AND CLOUDING OF SENSORIUM: DATE DISORIENTATION BY MORE THAN TWO CALENDAR DAYS
AUDITORY DISTURBANCES: NOT PRESENT
PAROXYSMAL SWEATS: NO SWEAT VISIBLE
VISUAL DISTURBANCES: NOT PRESENT
TREMOR: NO TREMOR
AUDITORY DISTURBANCES: VERY MILD HARSHNESS OR ABILITY TO FRIGHTEN
ANXIETY: MILDLY ANXIOUS
NAUSEA AND VOMITING: NO NAUSEA AND NO VOMITING
PAROXYSMAL SWEATS: *
ORIENTATION AND CLOUDING OF SENSORIUM: DISORIENTED FOR PLACE AND / OR PERSON
TOTAL SCORE: MILD ITCHING, PINS AND NEEDLES SENSATION, BURNING OR NUMBNESS
PAROXYSMAL SWEATS: NO SWEAT VISIBLE

## 2017-01-01 ASSESSMENT — ENCOUNTER SYMPTOMS
RESPIRATORY NEGATIVE: 1
CONSTITUTIONAL NEGATIVE: 1
COUGH: 0
TREMORS: 0
RESPIRATORY NEGATIVE: 1
SORE THROAT: 1
SHORTNESS OF BREATH: 0
SPEECH CHANGE: 0
BLOOD IN STOOL: 1
FEVER: 0
GASTROINTESTINAL NEGATIVE: 1
MYALGIAS: 1
NERVOUS/ANXIOUS: 1
FEVER: 0
WEAKNESS: 0
BLURRED VISION: 0
NERVOUS/ANXIOUS: 1
VOMITING: 0
BLOOD IN STOOL: 0
VOMITING: 0
VOMITING: 0
BRUISES/BLEEDS EASILY: 0
CARDIOVASCULAR NEGATIVE: 1
MUSCULOSKELETAL NEGATIVE: 1
NAUSEA: 0
DEPRESSION: 0
ABDOMINAL PAIN: 0
ABDOMINAL PAIN: 0
EYES NEGATIVE: 1
BLURRED VISION: 0
EYES NEGATIVE: 1
EYE DISCHARGE: 0
EYES NEGATIVE: 1
CONSTITUTIONAL NEGATIVE: 1
BLOOD IN STOOL: 0
FEVER: 0
HEARTBURN: 0
COUGH: 0
CARDIOVASCULAR NEGATIVE: 1
SHORTNESS OF BREATH: 0
FEVER: 0
MUSCULOSKELETAL NEGATIVE: 1
BLOOD IN STOOL: 0
FEVER: 1
GASTROINTESTINAL NEGATIVE: 1
SORE THROAT: 1
HEARTBURN: 1
CHILLS: 1
HEARTBURN: 0
CARDIOVASCULAR NEGATIVE: 1
WEAKNESS: 0
PSYCHIATRIC NEGATIVE: 1
VOMITING: 1
DEPRESSION: 0
NAUSEA: 0
BACK PAIN: 0
DIZZINESS: 0
RESPIRATORY NEGATIVE: 1
EYE REDNESS: 0
NAUSEA: 0
HEARTBURN: 0
SPEECH CHANGE: 0
SHORTNESS OF BREATH: 0
RESPIRATORY NEGATIVE: 1
SHORTNESS OF BREATH: 0
CONSTITUTIONAL NEGATIVE: 1
COUGH: 0
MYALGIAS: 0
ABDOMINAL PAIN: 1
HEADACHES: 0
CHILLS: 0
FEVER: 0
ABDOMINAL PAIN: 0
BLOOD IN STOOL: 1
FEVER: 0
WEAKNESS: 1
CHILLS: 0
HEADACHES: 0
RESPIRATORY NEGATIVE: 1
SHORTNESS OF BREATH: 0
HEADACHES: 0
NAUSEA: 1
NAUSEA: 1
ABDOMINAL PAIN: 1
DIZZINESS: 0

## 2017-01-01 ASSESSMENT — COPD QUESTIONNAIRES
COPD SCREENING SCORE: 5
DO YOU EVER COUGH UP ANY MUCUS OR PHLEGM?: YES, A FEW DAYS A WEEK OR MONTH
HAVE YOU SMOKED AT LEAST 100 CIGARETTES IN YOUR ENTIRE LIFE: YES
COPD SCREENING SCORE: 5
DURING THE PAST 4 WEEKS HOW MUCH DID YOU FEEL SHORT OF BREATH: SOME OF THE TIME
DO YOU EVER COUGH UP ANY MUCUS OR PHLEGM?: YES, A FEW DAYS A WEEK OR MONTH
DO YOU EVER COUGH UP ANY MUCUS OR PHLEGM?: YES, A FEW DAYS A WEEK OR MONTH
DURING THE PAST 4 WEEKS HOW MUCH DID YOU FEEL SHORT OF BREATH: SOME OF THE TIME
HAVE YOU SMOKED AT LEAST 100 CIGARETTES IN YOUR ENTIRE LIFE: YES
COPD SCREENING SCORE: 5
DURING THE PAST 4 WEEKS HOW MUCH DID YOU FEEL SHORT OF BREATH: SOME OF THE TIME
DURING THE PAST 4 WEEKS HOW MUCH DID YOU FEEL SHORT OF BREATH: SOME OF THE TIME
DO YOU EVER COUGH UP ANY MUCUS OR PHLEGM?: YES, A FEW DAYS A WEEK OR MONTH
DO YOU EVER COUGH UP ANY MUCUS OR PHLEGM?: YES, A FEW DAYS A WEEK OR MONTH
HAVE YOU SMOKED AT LEAST 100 CIGARETTES IN YOUR ENTIRE LIFE: YES
DURING THE PAST 4 WEEKS HOW MUCH DID YOU FEEL SHORT OF BREATH: SOME OF THE TIME
DURING THE PAST 4 WEEKS HOW MUCH DID YOU FEEL SHORT OF BREATH: SOME OF THE TIME
DO YOU EVER COUGH UP ANY MUCUS OR PHLEGM?: YES, A FEW DAYS A WEEK OR MONTH
HAVE YOU SMOKED AT LEAST 100 CIGARETTES IN YOUR ENTIRE LIFE: YES
COPD SCREENING SCORE: 5
COPD SCREENING SCORE: 5
HAVE YOU SMOKED AT LEAST 100 CIGARETTES IN YOUR ENTIRE LIFE: YES
DURING THE PAST 4 WEEKS HOW MUCH DID YOU FEEL SHORT OF BREATH: SOME OF THE TIME
DURING THE PAST 4 WEEKS HOW MUCH DID YOU FEEL SHORT OF BREATH: SOME OF THE TIME
DO YOU EVER COUGH UP ANY MUCUS OR PHLEGM?: YES, A FEW DAYS A WEEK OR MONTH
COPD SCREENING SCORE: 5
HAVE YOU SMOKED AT LEAST 100 CIGARETTES IN YOUR ENTIRE LIFE: YES
HAVE YOU SMOKED AT LEAST 100 CIGARETTES IN YOUR ENTIRE LIFE: YES
DO YOU EVER COUGH UP ANY MUCUS OR PHLEGM?: YES, A FEW DAYS A WEEK OR MONTH
DO YOU EVER COUGH UP ANY MUCUS OR PHLEGM?: NO/ONLY WITH OCCASIONAL COLDS OR INFECTIONS
COPD SCREENING SCORE: 5
DO YOU EVER COUGH UP ANY MUCUS OR PHLEGM?: YES, A FEW DAYS A WEEK OR MONTH
HAVE YOU SMOKED AT LEAST 100 CIGARETTES IN YOUR ENTIRE LIFE: YES
DURING THE PAST 4 WEEKS HOW MUCH DID YOU FEEL SHORT OF BREATH: SOME OF THE TIME
COPD SCREENING SCORE: 5
HAVE YOU SMOKED AT LEAST 100 CIGARETTES IN YOUR ENTIRE LIFE: YES
HAVE YOU SMOKED AT LEAST 100 CIGARETTES IN YOUR ENTIRE LIFE: YES
COPD SCREENING SCORE: 5
COPD SCREENING SCORE: 5
DURING THE PAST 4 WEEKS HOW MUCH DID YOU FEEL SHORT OF BREATH: SOME OF THE TIME

## 2017-01-01 ASSESSMENT — PATIENT HEALTH QUESTIONNAIRE - PHQ9
4. FEELING TIRED OR HAVING LITTLE ENERGY: NEARLY EVERY DAY
SUM OF ALL RESPONSES TO PHQ9 QUESTIONS 1 AND 2: 1
7. TROUBLE CONCENTRATING ON THINGS, SUCH AS READING THE NEWSPAPER OR WATCHING TELEVISION: MORE THAN HALF THE DAYS
7. TROUBLE CONCENTRATING ON THINGS, SUCH AS READING THE NEWSPAPER OR WATCHING TELEVISION: MORE THAN HALF THE DAYS
8. MOVING OR SPEAKING SO SLOWLY THAT OTHER PEOPLE COULD HAVE NOTICED. OR THE OPPOSITE, BEING SO FIGETY OR RESTLESS THAT YOU HAVE BEEN MOVING AROUND A LOT MORE THAN USUAL: NOT AT ALL
SUM OF ALL RESPONSES TO PHQ QUESTIONS 1-9: 9
5. POOR APPETITE OR OVEREATING: NOT AT ALL
6. FEELING BAD ABOUT YOURSELF - OR THAT YOU ARE A FAILURE OR HAVE LET YOURSELF OR YOUR FAMILY DOWN: MORE THAN HALF THE DAYS
3. TROUBLE FALLING OR STAYING ASLEEP OR SLEEPING TOO MUCH: SEVERAL DAYS
9. THOUGHTS THAT YOU WOULD BE BETTER OFF DEAD, OR OF HURTING YOURSELF: NOT AT ALL
SUM OF ALL RESPONSES TO PHQ9 QUESTIONS 1 AND 2: 1
3. TROUBLE FALLING OR STAYING ASLEEP OR SLEEPING TOO MUCH: SEVERAL DAYS
SUM OF ALL RESPONSES TO PHQ QUESTIONS 1-9: 9
5. POOR APPETITE OR OVEREATING: NOT AT ALL
4. FEELING TIRED OR HAVING LITTLE ENERGY: NEARLY EVERY DAY
5. POOR APPETITE OR OVEREATING: NOT AT ALL
1. LITTLE INTEREST OR PLEASURE IN DOING THINGS: NOT AT ALL
6. FEELING BAD ABOUT YOURSELF - OR THAT YOU ARE A FAILURE OR HAVE LET YOURSELF OR YOUR FAMILY DOWN: MORE THAN HALF THE DAYS
3. TROUBLE FALLING OR STAYING ASLEEP OR SLEEPING TOO MUCH: SEVERAL DAYS
7. TROUBLE CONCENTRATING ON THINGS, SUCH AS READING THE NEWSPAPER OR WATCHING TELEVISION: MORE THAN HALF THE DAYS
SUM OF ALL RESPONSES TO PHQ QUESTIONS 1-9: 9
4. FEELING TIRED OR HAVING LITTLE ENERGY: NEARLY EVERY DAY
8. MOVING OR SPEAKING SO SLOWLY THAT OTHER PEOPLE COULD HAVE NOTICED. OR THE OPPOSITE, BEING SO FIGETY OR RESTLESS THAT YOU HAVE BEEN MOVING AROUND A LOT MORE THAN USUAL: NOT AT ALL
SUM OF ALL RESPONSES TO PHQ9 QUESTIONS 1 AND 2: 1
1. LITTLE INTEREST OR PLEASURE IN DOING THINGS: NOT AT ALL
2. FEELING DOWN, DEPRESSED, IRRITABLE, OR HOPELESS: SEVERAL DAYS
9. THOUGHTS THAT YOU WOULD BE BETTER OFF DEAD, OR OF HURTING YOURSELF: NOT AT ALL
9. THOUGHTS THAT YOU WOULD BE BETTER OFF DEAD, OR OF HURTING YOURSELF: NOT AT ALL
2. FEELING DOWN, DEPRESSED, IRRITABLE, OR HOPELESS: SEVERAL DAYS
8. MOVING OR SPEAKING SO SLOWLY THAT OTHER PEOPLE COULD HAVE NOTICED. OR THE OPPOSITE, BEING SO FIGETY OR RESTLESS THAT YOU HAVE BEEN MOVING AROUND A LOT MORE THAN USUAL: NOT AT ALL
2. FEELING DOWN, DEPRESSED, IRRITABLE, OR HOPELESS: SEVERAL DAYS
6. FEELING BAD ABOUT YOURSELF - OR THAT YOU ARE A FAILURE OR HAVE LET YOURSELF OR YOUR FAMILY DOWN: MORE THAN HALF THE DAYS

## 2017-01-01 ASSESSMENT — PAIN SCALES - GENERAL
PAINLEVEL_OUTOF10: 0
PAINLEVEL_OUTOF10: 0
PAINLEVEL_OUTOF10: 5
PAINLEVEL_OUTOF10: 0
PAINLEVEL_OUTOF10: 7
PAINLEVEL_OUTOF10: 4
PAINLEVEL_OUTOF10: 0
PAINLEVEL_OUTOF10: 4
PAINLEVEL_OUTOF10: 2
PAINLEVEL_OUTOF10: 2
PAINLEVEL_OUTOF10: 5
PAINLEVEL_OUTOF10: 0
PAINLEVEL_OUTOF10: 4
PAINLEVEL_OUTOF10: 0
PAINLEVEL_OUTOF10: 9
PAINLEVEL_OUTOF10: 0
PAINLEVEL_OUTOF10: 4
PAINLEVEL_OUTOF10: 3
PAINLEVEL_OUTOF10: 0
PAINLEVEL_OUTOF10: 7
PAINLEVEL_OUTOF10: 4
PAINLEVEL_OUTOF10: 0
PAINLEVEL_OUTOF10: 0
PAINLEVEL_OUTOF10: 7
PAINLEVEL_OUTOF10: 0
PAINLEVEL_OUTOF10: 5
PAINLEVEL_OUTOF10: 0
PAINLEVEL_OUTOF10: 3
PAINLEVEL_OUTOF10: 6
PAINLEVEL_OUTOF10: 0
PAINLEVEL_OUTOF10: 2
PAINLEVEL_OUTOF10: 0
PAINLEVEL_OUTOF10: 0
PAINLEVEL_OUTOF10: 2
PAINLEVEL_OUTOF10: 0
PAINLEVEL_OUTOF10: 0
PAINLEVEL_OUTOF10: 4
PAINLEVEL_OUTOF10: 0
PAINLEVEL_OUTOF10: 4
PAINLEVEL_OUTOF10: 2
PAINLEVEL_OUTOF10: 0
PAINLEVEL_OUTOF10: 4
PAINLEVEL_OUTOF10: 0
PAINLEVEL_OUTOF10: 6
PAINLEVEL_OUTOF10: 0
PAINLEVEL_OUTOF10: 2
PAINLEVEL_OUTOF10: 3
PAINLEVEL_OUTOF10: 0
PAINLEVEL_OUTOF10: 7
PAINLEVEL_OUTOF10: 0
PAINLEVEL_OUTOF10: 5
PAINLEVEL_OUTOF10: 0
PAINLEVEL_OUTOF10: 7
PAINLEVEL_OUTOF10: 0
PAINLEVEL_OUTOF10: 0

## 2017-01-01 ASSESSMENT — PULMONARY FUNCTION TESTS
FVC: 1.1
FVC: 1.6
FVC: 1.1
FVC: 1.1
FVC: 1

## 2017-01-05 NOTE — ED AVS SNAPSHOT
[employer] Access Code: [email or web address]  Expires: 2/5/2017  4:03 PM    Your email address is not on file at [facility].  Email Addresses are required for you to sign up for [employer], please contact [phone] to verify your personal information and to provide your email address prior to attempting to register for [employer].    Dane Lincoln  [address] [place] [address], [place] [address]    [employer]  A secure, online tool to manage your health information     [facility]’s [employer]® is a secure, online tool that connects you to your personalized health information from the privacy of your home -- day or night - making it very easy for you to manage your healthcare. Once the activation process is completed, you can even access your medical information using the [employer] allyson, which is available for free in the Apple Allyson store or Google Play store.     To learn more about [employer], visit [email or web address]    There are two levels of access available (as shown below):   My Chart Features  [facility] Primary Care Doctor [facility]  Specialists [facility]  Urgent  Care Non-[facility] Primary Care Doctor   Email your healthcare team securely and privately 24/7 X X X    Manage appointments: schedule your next appointment; view details of past/upcoming appointments X      Request prescription refills. X      View recent personal medical records, including lab and immunizations X X X X   View health record, including health history, allergies, medications X X X X   Read reports about your outpatient visits, procedures, consult and ER notes X X X X   See your discharge summary, which is a recap of your hospital and/or ER visit that includes your diagnosis, lab results, and care plan X X  X     How to register for [employer]:  Once your e-mail address has been verified, follow the following steps to sign up for [employer].     1. Go to  [email or web address]  2. Click on the Sign Up Now box, which takes you to the New Member Sign Up page. You will need  to provide the following information:  a. Enter your [employer] Access Code exactly as it appears at the top of this page. (You will not need to use this code after you’ve completed the sign-up process. If you do not sign up before the expiration date, you must request a new code.)   b. Enter your date of birth.   c. Enter your home email address.   d. Click Submit, and follow the next screen’s instructions.  3. Create a [employer] ID. This will be your [employer] login ID and cannot be changed, so think of one that is secure and easy to remember.  4. Create a [employer] password. You can change your password at any time.  5. Enter your Password Reset Question and Answer. This can be used at a later time if you forget your password.   6. Enter your e-mail address. This allows you to receive e-mail notifications when new information is available in [employer].  7. Click Sign Up. You can now view your health information.    For assistance activating your [employer] account, call [phone]

## 2017-01-05 NOTE — ED AVS SNAPSHOT
After Visit Summary                                                                                                                Dane Lincoln   MRN: [id number]    Department:  [facility], Emergency Dept   Date of Visit:  1/5/2017            [facility], Emergency Dept    [address]    [place] [address]    Phone:  [phone]      You were seen by     1. Kevan Loaiza M.D.    2. Veto Medeiros M.D.    3. Shashi Levi M.D.      Your Diagnosis Was     Alcohol intoxication, with delirium (HCC)     F10.121       These are the medications you received during your hospitalization from 01/05/2017 2159 to 01/06/2017 1603     Date/Time Order Dose Route Action    01/05/2017 2337 er detox iv 1000 mL (D5LR + thiamine 100 mg + folic acid 1 mg + magnesium 1 g) infusion 1,000 mL Intravenous New Bag    01/05/2017 2337 lorazepam (ATIVAN) injection 1 mg 1 mg Intramuscular Given      Medication Information     Review all of your home medications and newly ordered medications with your primary doctor and/or pharmacist as soon as possible. Follow medication instructions as directed by your doctor and/or pharmacist.     Please keep your complete medication list with you and share with your physician. Update the information when medications are discontinued, doses are changed, or new medications (including over-the-counter products) are added; and carry medication information at all times in the event of emergency situations.               Medication List      Notice     You have not been prescribed any medications.            Procedures and tests performed during your visit     Procedure/Test Number of Times Performed    Blood Alcohol 1    CBC WITH DIFFERENTIAL 1    COMP METABOLIC PANEL 1    CT-HEAD W/O 1    ESTIMATED GFR 1    IV Saline Lock 1    LIPASE 1    POC BREATHALIZER 3    Pulse Ox 1    URINE DRUG SCREEN (TRIAGE) 1        Discharge Instructions       Alcohol  "Intoxication  Alcohol intoxication occurs when the amount of alcohol that a person has consumed impairs his or her ability to mentally and physically function. Alcohol directly impairs the normal chemical activity of the brain. Drinking large amounts of alcohol can lead to changes in mental function and behavior, and it can cause many physical effects that can be harmful.   Alcohol intoxication can range in severity from mild to very severe. Various factors can affect the level of intoxication that occurs, such as the person's age, gender, weight, frequency of alcohol consumption, and the presence of other medical conditions (such as diabetes, seizures, or heart conditions). Dangerous levels of alcohol intoxication may occur when people drink large amounts of alcohol in a short period (binge drinking). Alcohol can also be especially dangerous when combined with certain prescription medicines or \"recreational\" drugs.  SIGNS AND SYMPTOMS  Some common signs and symptoms of mild alcohol intoxication include:  1. Loss of coordination.  2. Changes in mood and behavior.  3. Impaired judgment.  4. Slurred speech.  As alcohol intoxication progresses to more severe levels, other signs and symptoms will appear. These may include:  · Vomiting.  · Confusion and impaired memory.  · Slowed breathing.  · Seizures.  · Loss of consciousness.  DIAGNOSIS   Your health care provider will take a medical history and perform a physical exam. You will be asked about the amount and type of alcohol you have consumed. Blood tests will be done to measure the concentration of alcohol in your blood. In many places, your blood alcohol level must be lower than 80 mg/dL (0.08%) to legally drive. However, many dangerous effects of alcohol can occur at much lower levels.   TREATMENT   People with alcohol intoxication often do not require treatment. Most of the effects of alcohol intoxication are temporary, and they go away as the alcohol naturally " leaves the body. Your health care provider will monitor your condition until you are stable enough to go home. Fluids are sometimes given through an IV access tube to help prevent dehydration.   HOME CARE INSTRUCTIONS  · Do not drive after drinking alcohol.  · Stay hydrated. Drink enough water and fluids to keep your urine clear or pale yellow. Avoid caffeine.    · Only take over-the-counter or prescription medicines as directed by your health care provider.    SEEK MEDICAL CARE IF:   · You have persistent vomiting.    · You do not feel better after a few days.  · You have frequent alcohol intoxication. Your health care provider can help determine if you should see a substance use treatment counselor.  SEEK IMMEDIATE MEDICAL CARE IF:   · You become shaky or tremble when you try to stop drinking.    · You shake uncontrollably (seizure).    · You throw up (vomit) blood. This may be bright red or may look like black coffee grounds.    · You have blood in your stool. This may be bright red or may appear as a black, tarry, bad smelling stool.    · You become lightheaded or faint.    MAKE SURE YOU:   · Understand these instructions.  · Will watch your condition.  · Will get help right away if you are not doing well or get worse.     This information is not intended to replace advice given to you by your health care provider. Make sure you discuss any questions you have with your health care provider.     Document Released: 09/27/2006 Document Revised: 08/20/2014 Document Reviewed: 05/23/2014  [employer] Interactive Patient Education ©2016 [employer]        Depression, Adult  Depression refers to feeling sad, low, down in the dumps, blue, gloomy, or empty. In general, there are two kinds of depression:  5. Normal sadness or normal grief. This kind of depression is one that we all feel from time to time after upsetting life experiences, such as the loss of a job or the ending of a relationship. This kind of depression is  considered normal, is short lived, and resolves within a few days to 2 weeks. Depression experienced after the loss of a loved one (bereavement) often lasts longer than 2 weeks but normally gets better with time.  6. Clinical depression. This kind of depression lasts longer than normal sadness or normal grief or interferes with your ability to function at home, at work, and in school. It also interferes with your personal relationships. It affects almost every aspect of your life. Clinical depression is an illness.  Symptoms of depression can also be caused by conditions other than those mentioned above, such as:  · Physical illness. Some physical illnesses, including underactive thyroid gland (hypothyroidism), severe anemia, specific types of cancer, diabetes, uncontrolled seizures, heart and lung problems, strokes, and chronic pain are commonly associated with symptoms of depression.  · Side effects of some prescription medicine. In some people, certain types of medicine can cause symptoms of depression.  · Substance abuse. Abuse of alcohol and illicit drugs can cause symptoms of depression.  SYMPTOMS  Symptoms of normal sadness and normal grief include the following:  · Feeling sad or crying for short periods of time.  · Not caring about anything (apathy).  · Difficulty sleeping or sleeping too much.  · No longer able to enjoy the things you used to enjoy.  · Desire to be by oneself all the time (social isolation).  · Lack of energy or motivation.  · Difficulty concentrating or remembering.  · Change in appetite or weight.  · Restlessness or agitation.  Symptoms of clinical depression include the same symptoms of normal sadness or normal grief and also the following symptoms:  · Feeling sad or crying all the time.  · Feelings of guilt or worthlessness.  · Feelings of hopelessness or helplessness.  · Thoughts of suicide or the desire to harm yourself (suicidal ideation).  · Loss of touch with reality (psychotic  symptoms). Seeing or hearing things that are not real (hallucinations) or having false beliefs about your life or the people around you (delusions and paranoia).  DIAGNOSIS   The diagnosis of clinical depression is usually based on how bad the symptoms are and how long they have lasted. Your health care provider will also ask you questions about your medical history and substance use to find out if physical illness, use of prescription medicine, or substance abuse is causing your depression. Your health care provider may also order blood tests.  TREATMENT   Often, normal sadness and normal grief do not require treatment. However, sometimes antidepressant medicine is given for bereavement to ease the depressive symptoms until they resolve.  The treatment for clinical depression depends on how bad the symptoms are but often includes antidepressant medicine, counseling with a mental health professional, or both. Your health care provider will help to determine what treatment is best for you.  Depression caused by physical illness usually goes away with appropriate medical treatment of the illness. If prescription medicine is causing depression, talk with your health care provider about stopping the medicine, decreasing the dose, or changing to another medicine.  Depression caused by the abuse of alcohol or illicit drugs goes away when you stop using these substances. Some adults need professional help in order to stop drinking or using drugs.  SEEK IMMEDIATE MEDICAL CARE IF:  · You have thoughts about hurting yourself or others.  · You lose touch with reality (have psychotic symptoms).  · You are taking medicine for depression and have a serious side effect.  FOR MORE INFORMATION  · National [place] on Mental Illness: www.viridiana.org   · National [place] of Mental Health: www.nimh.nih.gov      This information is not intended to replace advice given to you by your health care provider. Make sure you discuss any  questions you have with your health care provider.     Document Released: 12/15/2001 Document Revised: 01/08/2016 Document Reviewed: 03/18/2013  Elsevier Interactive Patient Education ©2016 [employer]            Patient Information     Patient Information    Following emergency treatment: all patient requiring follow-up care must return either to a private physician or a clinic if your condition worsens before you are able to obtain further medical attention, please return to the emergency room.     Billing Information    [facility] we work to make the billing process streamlined for our patients.  Our Representatives are here to answer any questions you may have regarding your hospital bill.  If you have insurance coverage and have supplied your insurance information to us, we will submit a claim to your insurer on your behalf.  Should you have any questions regarding your bill, we can be reached online or by phone as follows:  Online: You are able pay your bills online or live chat with our representatives about any billing questions you may have. We are here to help Monday - Friday from 8:00am to 7:30pm and 9:00am - 12:00pm on Saturdays.  Please visit [email or web address]  for more information.   Phone:  [phone] or [phone]    Please note that your emergency physician, surgeon, pathologist, radiologist, anesthesiologist, and other specialists are not employed by [facility] and will therefore bill separately for their services.  Please contact them directly for any questions concerning their bills at the numbers below:     Emer[facility] Physician Services:  [phone]  [facility]:  [phone]  [facility]:  [phone]  [facility]:  [phone]    1. Your final bill may vary from the amount quoted upon discharge if all procedures are not complete at that time, or if your doctor has additional procedures of which  we are not aware. You will receive an additional bill if you return to the Emergency Department at [facility] for suture removal regardless of the facility of which the sutures were placed.     2. Please arrange for settlement of this account at the emergency registration.    3. All self-pay accounts are due in full at the time of treatment.  If you are unable to meet this obligation then payment is expected within 4-5 days.     4. If you have had radiology studies (CT, X-ray, Ultrasound, MRI), you have received a preliminary result during your emergency department visit. Please contact the radiology department [phone] to receive a copy of your final result. Please discuss the Final result with your primary physician or with the follow up physician provided.     Crisis Hotline:  [place] Crisis Hotline:  3-931-CJHYLYM or 1-151.422.6066  [place] Crisis Hotline:    1-869.105.7573 or 190-965-5804         ED Discharge Follow Up Questions    1. In order to provide you with very good care, we would like to follow up with a phone call in the next few days.  May we have your permission to contact you?     YES /  NO    2. What is the best phone number to call you? (       )_____-__________    3. What is the best time to call you?      Morning  /  Afternoon  /  Evening                   Patient Signature:  ____________________________________________________________    Date:  ____________________________________________________________

## 2017-01-05 NOTE — ED AVS SNAPSHOT
1/6/2017          Danekaci Lincoln  [address] [place] [address] [place] [address]    Dear Dane:    [facility] wants to ensure your discharge home is safe and you or your loved ones have had all your questions answered regarding your care after you leave the hospital.    You may receive a telephone call within two days of your discharge.  This call is to make certain you understand your discharge instructions as well as ensure we provided you with the best care possible during your stay with us.     The call will only last approximately 3-5 minutes and will be done by a nurse.    Once again, we want to ensure your discharge home is safe and that you have a clear understanding of any next steps in your care.  If you have any questions or concerns, please do not hesitate to contact us, we are here for you.  Thank you for choosing [facility] for your healthcare needs.    Sincerely,    Pio Herbert    [facility]

## 2017-01-06 NOTE — ED NOTES
Pt sleeping on rt side in bed, resp are even and unlabored, no distress noted, waiting to speak with life skills nurse when etoh is 0.08 or less

## 2017-01-06 NOTE — ED NOTES
Received report, assumed care, no distress noted, POC is waiting for pt to metabolize to below the legal limit, then lifeskills will evaluate.

## 2017-01-06 NOTE — ED NOTES
Pt sleeping on lt side in bed, resp are even and unlabored, iv fluids continue, PSA in direct view of pt, will monitor

## 2017-01-06 NOTE — ED NOTES
Pt changed into gown, all belongings placed into one blue personal belongings bag, placed in secure holding area, pt calm and cooperative with treatment plan, given warm blankets, strong smell of etoh on body, chart up for erp to deanna

## 2017-01-06 NOTE — DISCHARGE INSTRUCTIONS
"Alcohol Intoxication  Alcohol intoxication occurs when the amount of alcohol that a person has consumed impairs his or her ability to mentally and physically function. Alcohol directly impairs the normal chemical activity of the brain. Drinking large amounts of alcohol can lead to changes in mental function and behavior, and it can cause many physical effects that can be harmful.   Alcohol intoxication can range in severity from mild to very severe. Various factors can affect the level of intoxication that occurs, such as the person's age, gender, weight, frequency of alcohol consumption, and the presence of other medical conditions (such as diabetes, seizures, or heart conditions). Dangerous levels of alcohol intoxication may occur when people drink large amounts of alcohol in a short period (binge drinking). Alcohol can also be especially dangerous when combined with certain prescription medicines or \"recreational\" drugs.  SIGNS AND SYMPTOMS  Some common signs and symptoms of mild alcohol intoxication include:  1. Loss of coordination.  2. Changes in mood and behavior.  3. Impaired judgment.  4. Slurred speech.  As alcohol intoxication progresses to more severe levels, other signs and symptoms will appear. These may include:  · Vomiting.  · Confusion and impaired memory.  · Slowed breathing.  · Seizures.  · Loss of consciousness.  DIAGNOSIS   Your health care provider will take a medical history and perform a physical exam. You will be asked about the amount and type of alcohol you have consumed. Blood tests will be done to measure the concentration of alcohol in your blood. In many places, your blood alcohol level must be lower than 80 mg/dL (0.08%) to legally drive. However, many dangerous effects of alcohol can occur at much lower levels.   TREATMENT   People with alcohol intoxication often do not require treatment. Most of the effects of alcohol intoxication are temporary, and they go away as the alcohol " naturally leaves the body. Your health care provider will monitor your condition until you are stable enough to go home. Fluids are sometimes given through an IV access tube to help prevent dehydration.   HOME CARE INSTRUCTIONS  · Do not drive after drinking alcohol.  · Stay hydrated. Drink enough water and fluids to keep your urine clear or pale yellow. Avoid caffeine.    · Only take over-the-counter or prescription medicines as directed by your health care provider.    SEEK MEDICAL CARE IF:   · You have persistent vomiting.    · You do not feel better after a few days.  · You have frequent alcohol intoxication. Your health care provider can help determine if you should see a substance use treatment counselor.  SEEK IMMEDIATE MEDICAL CARE IF:   · You become shaky or tremble when you try to stop drinking.    · You shake uncontrollably (seizure).    · You throw up (vomit) blood. This may be bright red or may look like black coffee grounds.    · You have blood in your stool. This may be bright red or may appear as a black, tarry, bad smelling stool.    · You become lightheaded or faint.    MAKE SURE YOU:   · Understand these instructions.  · Will watch your condition.  · Will get help right away if you are not doing well or get worse.     This information is not intended to replace advice given to you by your health care provider. Make sure you discuss any questions you have with your health care provider.     Document Released: 09/27/2006 Document Revised: 08/20/2014 Document Reviewed: 05/23/2014  [employer] Interactive Patient Education ©2016 [employer]        Depression, Adult  Depression refers to feeling sad, low, down in the dumps, blue, gloomy, or empty. In general, there are two kinds of depression:  5. Normal sadness or normal grief. This kind of depression is one that we all feel from time to time after upsetting life experiences, such as the loss of a job or the ending of a relationship. This kind of  depression is considered normal, is short lived, and resolves within a few days to 2 weeks. Depression experienced after the loss of a loved one (bereavement) often lasts longer than 2 weeks but normally gets better with time.  6. Clinical depression. This kind of depression lasts longer than normal sadness or normal grief or interferes with your ability to function at home, at work, and in school. It also interferes with your personal relationships. It affects almost every aspect of your life. Clinical depression is an illness.  Symptoms of depression can also be caused by conditions other than those mentioned above, such as:  · Physical illness. Some physical illnesses, including underactive thyroid gland (hypothyroidism), severe anemia, specific types of cancer, diabetes, uncontrolled seizures, heart and lung problems, strokes, and chronic pain are commonly associated with symptoms of depression.  · Side effects of some prescription medicine. In some people, certain types of medicine can cause symptoms of depression.  · Substance abuse. Abuse of alcohol and illicit drugs can cause symptoms of depression.  SYMPTOMS  Symptoms of normal sadness and normal grief include the following:  · Feeling sad or crying for short periods of time.  · Not caring about anything (apathy).  · Difficulty sleeping or sleeping too much.  · No longer able to enjoy the things you used to enjoy.  · Desire to be by oneself all the time (social isolation).  · Lack of energy or motivation.  · Difficulty concentrating or remembering.  · Change in appetite or weight.  · Restlessness or agitation.  Symptoms of clinical depression include the same symptoms of normal sadness or normal grief and also the following symptoms:  · Feeling sad or crying all the time.  · Feelings of guilt or worthlessness.  · Feelings of hopelessness or helplessness.  · Thoughts of suicide or the desire to harm yourself (suicidal ideation).  · Loss of touch with reality  (psychotic symptoms). Seeing or hearing things that are not real (hallucinations) or having false beliefs about your life or the people around you (delusions and paranoia).  DIAGNOSIS   The diagnosis of clinical depression is usually based on how bad the symptoms are and how long they have lasted. Your health care provider will also ask you questions about your medical history and substance use to find out if physical illness, use of prescription medicine, or substance abuse is causing your depression. Your health care provider may also order blood tests.  TREATMENT   Often, normal sadness and normal grief do not require treatment. However, sometimes antidepressant medicine is given for bereavement to ease the depressive symptoms until they resolve.  The treatment for clinical depression depends on how bad the symptoms are but often includes antidepressant medicine, counseling with a mental health professional, or both. Your health care provider will help to determine what treatment is best for you.  Depression caused by physical illness usually goes away with appropriate medical treatment of the illness. If prescription medicine is causing depression, talk with your health care provider about stopping the medicine, decreasing the dose, or changing to another medicine.  Depression caused by the abuse of alcohol or illicit drugs goes away when you stop using these substances. Some adults need professional help in order to stop drinking or using drugs.  SEEK IMMEDIATE MEDICAL CARE IF:  · You have thoughts about hurting yourself or others.  · You lose touch with reality (have psychotic symptoms).  · You are taking medicine for depression and have a serious side effect.  FOR MORE INFORMATION  · National [place] on Mental Illness: www.viridiana.org   · National [place] of Mental Health: www.nimh.nih.gov      This information is not intended to replace advice given to you by your health care provider. Make sure you discuss  any questions you have with your health care provider.     Document Released: 12/15/2001 Document Revised: 01/08/2016 Document Reviewed: 03/18/2013  [employer] Interactive Patient Education ©2016 [employer]

## 2017-01-06 NOTE — ED NOTES
[facility] willing to accept patient, pt willing to go to [facility], Social Work informed, ERP informed, will dc pt to [facility].

## 2017-01-06 NOTE — ED NOTES
Pt sleeping when nurse enters room, pt wakes easily, no distress noted, pt denies any pain or needs, vss, repeat breathalizer completed, PSA in direct observation of pt

## 2017-01-06 NOTE — ED NOTES
"Chief Complaint   Patient presents with   • Alcohol Intoxication     patient states \"carmelita been drinking a whole bunch\" 3 bottles of rum. pt wants help to quit    • Suicidal Ideation     pt showed me old scars on his wrists from \"past attempts\" pt states he wants to die        "

## 2017-01-06 NOTE — ED NOTES
Pt continues to sleep, resp are even and unlabored, no distress noted, waiting for life skills eval

## 2017-01-06 NOTE — DISCHARGE PLANNING
SW contacted [facility] ([phone]) spoke to staff and requested an assessment be done to review if pt is appropriate for their care, they will call back to arrange in-patient assesment.     [facility] is sending Chloe to come complete assessment for admission. Bedside RN notified.     SANDEEP spoke with RN who reports [facility] is taking pt to their facility for detox.

## 2017-01-06 NOTE — ED NOTES
Pt continues to sleep laying on lt side in bed, resp are even and unlabored, no distress noted, PSA in direct view of pt

## 2017-01-06 NOTE — ED PROVIDER NOTES
"ED Provider Note    CHIEF COMPLAINT  Chief Complaint   Patient presents with   • Alcohol Intoxication     patient states \"carmelita been drinking a whole bunch\" 3 bottles of rum. pt wants help to quit    • Suicidal Ideation     pt showed me old scars on his wrists from \"past attempts\" pt states he wants to die        HPI  Dane Lincoln is a 56 y.o. male who presents to the ED with alcohol intoxication and suicidal ideation. The patient is a very poor historian, states that he drinks alcohol all day every day. He states that he is suicidal, states he wants to jump in front of traffic. Patient states that he has a history of previous schizoaffective disorder with cutting behavior. Patient has some old ecchymosis to his face she cannot recall if he fell, he states that his dog scratched him, does not have a headache, no chest pains, shortness of breath, no numbness, tingling, weakness. Patient denies drug use at this time. States that he has not been eating and drinking well.    REVIEW OF SYSTEMS  See HPI for further details. All other systems are negative.     PAST MEDICAL HISTORY  Past Medical History   Diagnosis Date   • Hepatitis C    • COPD (chronic obstructive pulmonary disease) (HCC) 2/1/2010   • Liver cirrhosis (HCC)    • Hx of ascites    • Psychiatric disorder      schizophrenic   • Depression    • Alcoholism (HCC)        FAMILY HISTORY  Family History   Problem Relation Age of Onset   • Cancer Mother      leukemia   • GI Father        SOCIAL HISTORY  Social History     Social History   • Marital Status:      Spouse Name: N/A   • Number of Children: N/A   • Years of Education: N/A     Social History Main Topics   • Smoking status: Current Every Day Smoker -- 1.00 packs/day for 35 years     Types: Cigarettes   • Smokeless tobacco: Never Used   • Alcohol Use: Yes      Comment: daily 7/6 5 earth quakes   • Drug Use: No      Comment: hx of iv drugs, quit 1999   • Sexual Activity: Not Currently     Other " "Topics Concern   • None     Social History Narrative       SURGICAL HISTORY  History reviewed. No pertinent past surgical history.    CURRENT MEDICATIONS  Home Medications     Reviewed by Mildred Fischer R.N. (Registered Nurse) on 01/05/17 at 2217  Med List Status: Complete    Medication Last Dose Status          Patient Tj Taking any Medications                        ALLERGIES  No Known Allergies    PHYSICAL EXAM  VITAL SIGNS: /56 mmHg  Pulse 118  Temp(Src) 36.2 °C (97.1 °F)  Resp 16  Ht 1.727 m (5' 8\")  Wt 107.3 kg (236 lb 8.9 oz)  BMI 35.98 kg/m2  SpO2 93%  Constitutional:  Well developed, Well nourished, mild distress, Non-toxic appearance.   HENT: Old appearing ecchymosis on the right maxillary area also swelling and tenderness to palpation with a small abrasion on the anterior aspect and the bridge of the nose, slight old ecchymosis around the left eye.  Eyes: PERRLA, EOMI, Conjunctiva normal, No discharge.   Neck: Normal range of motion, No tenderness, Supple, No stridor. No midline C-spine tenderness  Lymphatic: No lymphadenopathy noted.   Cardiovascular: Tachycardic good pulses  Thorax & Lungs: Normal breath sounds, No respiratory distress, No wheezing, No chest tenderness.   Skin: Warm, Dry, No erythema, No rash.   Extremities: Intact distal pulses, No edema, No tenderness.   Neurologic: Awake alert slightly disoriented slightly slurred speech spells of alcohol good muscle strength throughout   Psychiatric: Depressed affect    RADIOLOGY/PROCEDURES  CT-HEAD W/O   Final Result      Unremarkable CT scan of the head without contrast. No change.               INTERPRETING LOCATION:  [address] [place] [address], [facility]            COURSE & MEDICAL DECISION MAKING  Pertinent Labs & Imaging studies reviewed. (See chart for details)  Patient with history of alcohol abuse that appears intoxicated he also states he suicidal, has a history of schizoaffective disorder. The patient appears to have old " trauma to the head, due to the patient's poor history I will get a CT scan of the head, give the patient a rally bag, Ativan, IV fluids, check electrolytes and alcohol.    Patient is resting comfortably, still is mildly tachycardic, still awaiting metabolism and sobriety. The patient will continue to be monitored, I do not believe the patient is actively withdrawing at this point in time, wants the patient has sobered up the patient will be reevaluated.    FINAL IMPRESSION  1. Stupor    2. Alcohol intoxication, with delirium (HCC)             This dictation was created using voice recognition software. The accuracy of the dictation is limited to the abilities of the software. I expect there may be some errors of grammar and possibly content. The nursing notes were reviewed and certain aspects of this information were incorporated into this note.    Electronically signed by: Kevan Loaiza, 1/5/2017 10:42 PM

## 2017-01-06 NOTE — ED NOTES
Pt awakens easily when nurse enters room, no distress or new needs voiced, sl flushed with 10ml of ns, site clear, resp are even and unlabored, will monitor

## 2017-01-06 NOTE — ED NOTES
Med rec complete per patient.  Patient denies taking prescription/ OTC medications.  No antibiotics within last month.  Allergies reviewed.

## 2017-01-07 NOTE — ED NOTES
Patient appropriate for discharge per ERP. Discussed discharge instructions, home care, and follow up with patient. Patient verbalized understanding. No distress noted. Pt taken to [facility] by [facility] Staff.

## 2017-02-08 NOTE — ED AVS SNAPSHOT
2/8/2017          Danekaci Jamilk  [address] [place] [address] [place] [id number]    Dear Dane:    [facility] wants to ensure your discharge home is safe and you or your loved ones have had all your questions answered regarding your care after you leave the hospital.    You may receive a telephone call within two days of your discharge.  This call is to make certain you understand your discharge instructions as well as ensure we provided you with the best care possible during your stay with us.     The call will only last approximately 3-5 minutes and will be done by a nurse.    Once again, we want to ensure your discharge home is safe and that you have a clear understanding of any next steps in your care.  If you have any questions or concerns, please do not hesitate to contact us, we are here for you.  Thank you for choosing [facility] for your healthcare needs.    Sincerely,    Pio Herbert    [facility]

## 2017-02-08 NOTE — ED PROVIDER NOTES
"CHIEF COMPLAINT  Chief Complaint   Patient presents with   • Suicidal Ideation   • Shortness of Breath   • Chest Pain   • Alcohol Intoxication       HPI (1,4)  Dane Lincoln is a 56 y.o. male who presents with suicidal ideation. He states that he almost tried to hang himself today. He has been having suicidal ideation for 20 years. He has history of depression but he stopped taking medication after he ran out of it. He slashed his wrists 5 years ago and has also tried to jump off a bridge.     He has also been having shortness of breath for the past 2 days. He has history of COPD and feels like he is having a flare. He reports wheezing as well as coughing up yellow sputum. He denies any chest pain.    He reports having diarrhea since 2-3 days. He is having 6-7 episodes of watery stools per day. He has also noticed some bright red blood after he wipes.     REVIEW OF SYSTEMS(2/10)  Pertinent positives include: Headache, diarrhea, shortness of breath, wheezing, productive cough, increased urinary frequency  Pertinent negatives include: Fever, chills, abdominal pain, nausea, vomiting, focal weakness   All other systems are negative.     PAST MEDICAL HISTORY(PFS1,2)  Past Medical History   Diagnosis Date   • Hepatitis C    • COPD (chronic obstructive pulmonary disease) (CMS-HCC) 2/1/2010   • Liver cirrhosis (CMS-HCC)    • Hx of ascites    • Psychiatric disorder      schizophrenic   • Depression    • Alcoholism (CMS-HCC)        FAMILY HISTORY  Family History   Problem Relation Age of Onset   • Cancer Mother      leukemia   • GI Father        SOCIAL HISTORY  Social History   Substance Use Topics   • Smoking status: Current Every Day Smoker -- 1.00 packs/day for 35 years     Types: Cigarettes   • Smokeless tobacco: Never Used   • Alcohol Use: Yes      Comment: daily 3-4 \"earthquakes\"     History   Drug Use No     Comment: hx of iv drugs, quit 1999     Smokes about 1/2 packs of cigarettes per day  Drinks couple of beers " "every day-denies any history of alcohol withdrawal  No illicit drug use       SURGICAL HISTORY  History reviewed. No pertinent past surgical history.    CURRENT MEDICATIONS  Home Medications     **Home medications have not yet been reviewed for this encounter**      Not on any medication (cannot afford)    ALLERGIES  No Known Allergies    PHYSICAL EXAM (2,8)  VITAL SIGNS: /79 mmHg  Pulse 123  Temp(Src) 36.7 °C (98.1 °F)  Resp 18  Ht 1.727 m (5' 8\")  Wt 99.791 kg (220 lb)  BMI 33.46 kg/m2 Reviewed     Constitutional:Disheveled. No acute distress  HENT:Moist mucous membranes.  Eyes:Equal, round and reactive  Cardiovascular: S1+S2. No added sounds  Respiratory: Poor air entry. No wheezing heard  Gastrointestinal: soft, non-tender, BS+  Skin:No rashes. No edema    Genitourinary: Deferred.  Neurologic: Grossly intact. Moving all extremities spontaneously.  Psychiatric: Depressed.    DIFFERENTIAL DIAGNOSIS:  Suicidal ideation  Alcohol intoxication  Electrolyte disturbance  Depression  Infectious diarrhea  COPD exacerbation    RADIOLOGY/PROCEDURES  DX-CHEST-PORTABLE (1 VIEW)   Final Result         1. Mild diffuse interstitial prominence though both lung could relate to mild vascular congestion.          LABORATORY: Reviewed as below.  Results for orders placed or performed during the hospital encounter of 02/08/17   URINE DRUG SCREEN (TRIAGE)   Result Value Ref Range    Amphetamines Urine Negative Negative    Barbiturates Negative Negative    Benzodiazepines Negative Negative    Cocaine Metabolite Negative Negative    Methadone Negative Negative    Ecstasy Negative Negative    Opiates Negative Negative    Oxycodone Negative Negative    Phencyclidine -Pcp Negative Negative    Propoxyphene Negative Negative    Cannabinoid Metab Negative Negative   CBC WITH DIFFERENTIAL   Result Value Ref Range    WBC 6.9 4.8 - 10.8 K/uL    RBC 4.69 (L) 4.70 - 6.10 M/uL    Hemoglobin 12.2 (L) 14.0 - 18.0 g/dL    Hematocrit 38.8 " (L) 42.0 - 52.0 %    MCV 82.7 81.4 - 97.8 fL    MCH 26.0 (L) 27.0 - 33.0 pg    MCHC 31.4 (L) 33.7 - 35.3 g/dL    RDW 49.9 35.9 - 50.0 fL    Platelet Count 90 (L) 164 - 446 K/uL    MPV 11.0 9.0 - 12.9 fL    Neutrophils-Polys 45.60 44.00 - 72.00 %    Lymphocytes 39.50 22.00 - 41.00 %    Monocytes 10.50 0.00 - 13.40 %    Eosinophils 2.70 0.00 - 6.90 %    Basophils 1.40 0.00 - 1.80 %    Immature Granulocytes 0.30 0.00 - 0.90 %    Nucleated RBC 0.00 /100 WBC    Neutrophils (Absolute) 3.15 1.82 - 7.42 K/uL    Lymphs (Absolute) 2.73 1.00 - 4.80 K/uL    Monos (Absolute) 0.73 0.00 - 0.85 K/uL    Eos (Absolute) 0.19 0.00 - 0.51 K/uL    Baso (Absolute) 0.10 0.00 - 0.12 K/uL    Immature Granulocytes (abs) 0.02 0.00 - 0.11 K/uL    NRBC (Absolute) 0.00 K/uL   COMP METABOLIC PANEL   Result Value Ref Range    Sodium 139 135 - 145 mmol/L    Potassium 3.2 (L) 3.6 - 5.5 mmol/L    Chloride 109 96 - 112 mmol/L    Co2 18 (L) 20 - 33 mmol/L    Anion Gap 12.0 (H) 0.0 - 11.9    Glucose 102 (H) 65 - 99 mg/dL    Bun 8 8 - 22 mg/dL    Creatinine 0.70 0.50 - 1.40 mg/dL    Calcium 8.8 8.5 - 10.5 mg/dL    AST(SGOT) 48 (H) 12 - 45 U/L    ALT(SGPT) 20 2 - 50 U/L    Alkaline Phosphatase 100 (H) 30 - 99 U/L    Total Bilirubin 2.3 (H) 0.1 - 1.5 mg/dL    Albumin 3.5 3.2 - 4.9 g/dL    Total Protein 7.2 6.0 - 8.2 g/dL    Globulin 3.7 (H) 1.9 - 3.5 g/dL    A-G Ratio 0.9 g/dL   ESTIMATED GFR   Result Value Ref Range    GFR If African American >60 >60 mL/min/1.73 m 2    GFR If Non African American >60 >60 mL/min/1.73 m 2   POC BREATHALIZER   Result Value Ref Range    POC Breathalizer 0.03 (A) 0.00 - 0.01 Percent   POC BREATHALIZER   Result Value Ref Range    POC Breathalizer 0.03 (A) 0.00 - 0.01 Percent       INTERVENTIONS:  Medications   ipratropium-albuterol (DUONEB) nebulizer solution 3 mL (not administered)   predniSONE (DELTASONE) tablet 40 mg (40 mg Oral Given 2/8/17 1441)   Rally Bag infusion ( Intravenous New Bag 2/8/17 1500)     And   thiamine  (THIAMINE) tablet 100 mg (not administered)     And   folic acid (FOLVITE) tablet 1 mg (not administered)     And   multivitamin (THERAGRAN) tablet 1 Tab (not administered)   lorazepam (ATIVAN) injection 1 mg (1 mg Intravenous Given 2/8/17 1513)       COURSE & MEDICAL DECISION MAKING  2:00 PM: Patient seen and examined. Differentials include suicidal ideation, depression, alcohol withdrawal, infectious diarrhea, COPD exacerbation. Ordered CBC, CMP, fecal occult blood test, urine tox screen, breathalyzer test, stool for C. difficile and WBC, chest x-ray, nebs, 40 mg prednisone, 1 L rally bag and 1mg Ativan. Life skills consult placed.    4:00 PM: Labs reviewed-patient has hypokalemia. 40 mEq of potassium chloride tablet given. Labs show possible alcoholic ketoacidosis and chronic anemia.  6:00PM: Pending transfer to [facility] for depression and suicidal ideation.    PLAN:  Transfer to [facility] for alcohol detox, depression, and suicidal ideation  Albuterol metered-dose inhaler    CONDITION: Stable    FINAL IMPRESSION  1. Alcohol withdrawal, uncomplicated ([facility])    2. Recurrent major depressive disorder, remission status unspecified (CMS-[facility])        Electronically signed by: German Barfield, 2/8/2017 1:53 PM      I independently evaluated the patient and repeated the important components of the history and physical. I discussed the management with the resident. I have reviewed and agree with the pertinent clinical information above including history, exam, study findings, and recommendations.     This patient presents with depression, suicidal ideation, history of alcohol abuse with alcoholic liver disease and current mild alcohol. He also has mild dehydration. He reports dyspnea and may have COPD. There is no evidence of pneumonia. With Ativan and IV fluids patient heart rate improved. He's been accepted by [facility] for inpatient detox and treatment of depression.    Electronically signed by: Ladarius Street,  2/8/2017 6:03 PM

## 2017-02-08 NOTE — ED AVS SNAPSHOT
GreenLancer Access Code: [email or web address]  Expires: 3/5/2017  9:43 AM    Your email address is not on file at [facility].  Email Addresses are required for you to sign up for GreenLancer, please contact [phone] to verify your personal information and to provide your email address prior to attempting to register for GreenLancer.    Dane Lincoln  [address] [place] [address], [place] [address]    GreenLancer  A secure, online tool to manage your health information     [employer]’s GreenLancer® is a secure, online tool that connects you to your personalized health information from the privacy of your home -- day or night - making it very easy for you to manage your healthcare. Once the activation process is completed, you can even access your medical information using the GreenLancer allyson, which is available for free in the Apple Allyson store or Google Play store.     To learn more about GreenLancer, visit [email or web address]    There are two levels of access available (as shown below):   My Chart Features  [facility] Primary Care Doctor [facility]  Specialists [facility]  Urgent  Care Non-[facility] Primary Care Doctor   Email your healthcare team securely and privately 24/7 X X X    Manage appointments: schedule your next appointment; view details of past/upcoming appointments X      Request prescription refills. X      View recent personal medical records, including lab and immunizations X X X X   View health record, including health history, allergies, medications X X X X   Read reports about your outpatient visits, procedures, consult and ER notes X X X X   See your discharge summary, which is a recap of your hospital and/or ER visit that includes your diagnosis, lab results, and care plan X X  X     How to register for [employer]:  Once your e-mail address has been verified, follow the following steps to sign up for GreenLancer.     1. Go to  [email or web address]  2. Click on the Sign Up Now box, which takes you to the New Member Sign Up page. You will need  to provide the following information:  a. Enter your [employer] Access Code exactly as it appears at the top of this page. (You will not need to use this code after you’ve completed the sign-up process. If you do not sign up before the expiration date, you must request a new code.)   b. Enter your date of birth.   c. Enter your home email address.   d. Click Submit, and follow the next screen’s instructions.  3. Create a [employer] ID. This will be your [employer] login ID and cannot be changed, so think of one that is secure and easy to remember.  4. Create a [employer] password. You can change your password at any time.  5. Enter your Password Reset Question and Answer. This can be used at a later time if you forget your password.   6. Enter your e-mail address. This allows you to receive e-mail notifications when new information is available in [employer].  7. Click Sign Up. You can now view your health information.    For assistance activating your [employer] account, call [phone]

## 2017-02-08 NOTE — ED NOTES
Called lab to collect blood, ECG complete by tech, life skills notified, respiratory called to give treatment. Pt sitting up in bed, cooperative.

## 2017-02-08 NOTE — ED NOTES
Patient to hallway bed with cc/o suicidal ideation.  Patient also complains of SOB, and CP.  States he has a plan to hang himself.  Belongings removed.  Watches initiated.  Patient moved to  38 at this time.
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
## 2017-02-09 NOTE — ED NOTES
Earlene called from [facility] [employer] and asked for report on pt. [facility] Stated she will be in to unit shortly for another pt.

## 2017-02-09 NOTE — ED NOTES
dave called. Report given regarding  time. dave will be here at 1845 and informed that pt bag needs to be infused and 1mg ativan needs to be given prior to leaving. dave also requested pt chart for their records.
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
## 2017-02-09 NOTE — ED NOTES
"Med rec updated and complete  Allergies reviewed  Pt states \"No prescription medications, OTC'S, or vitamins\".  Pt states \"No antibiotics in the last 30 days\".     "

## 2017-02-09 NOTE — ED NOTES
Med rec tech at bedside. Pt resting in bed, comfortable and denies needs. Informed about keeping neck turned to get fluids in. Will give 1 mg of ativan when fluids are complete.

## 2017-06-03 NOTE — ED AVS SNAPSHOT
[employer] Access Code: [email or web address]  Expires: 6/29/2017  3:58 AM    Your email address is not on file at [facility].  Email Addresses are required for you to sign up for [employer], please contact [phone] to verify your personal information and to provide your email address prior to attempting to register for [employer].    Dane Lincoln  [address] [place] [address], [place] [address]    [employer]  A secure, online tool to manage your health information     [facility]’s [employer]® is a secure, online tool that connects you to your personalized health information from the privacy of your home -- day or night - making it very easy for you to manage your healthcare. Once the activation process is completed, you can even access your medical information using the [employer] allyson, which is available for free in the Apple Allyson store or Google Play store.     To learn more about [employer], visit [email or web address]    There are two levels of access available (as shown below):   My Chart Features  [facility] Primary Care Doctor [facility]  Specialists [facility]  Urgent  Care Non-[facility] Primary Care Doctor   Email your healthcare team securely and privately 24/7 X X X    Manage appointments: schedule your next appointment; view details of past/upcoming appointments X      Request prescription refills. X      View recent personal medical records, including lab and immunizations X X X X   View health record, including health history, allergies, medications X X X X   Read reports about your outpatient visits, procedures, consult and ER notes X X X X   See your discharge summary, which is a recap of your hospital and/or ER visit that includes your diagnosis, lab results, and care plan X X  X     How to register for [employer]:  Once your e-mail address has been verified, follow the following steps to sign up for [employer].     1. Go to  [email or web address]  2. Click on the Sign Up Now box, which takes you to the New Member Sign Up page. You will need  to provide the following information:  a. Enter your [employer] Access Code exactly as it appears at the top of this page. (You will not need to use this code after you’ve completed the sign-up process. If you do not sign up before the expiration date, you must request a new code.)   b. Enter your date of birth.   c. Enter your home email address.   d. Click Submit, and follow the next screen’s instructions.  3. Create a [employer] ID. This will be your [employer] login ID and cannot be changed, so think of one that is secure and easy to remember.  4. Create a [employer] password. You can change your password at any time.  5. Enter your Password Reset Question and Answer. This can be used at a later time if you forget your password.   6. Enter your e-mail address. This allows you to receive e-mail notifications when new information is available in [employer].  7. Click Sign Up. You can now view your health information.    For assistance activating your [employer] account, call [phone]

## 2017-06-03 NOTE — ED AVS SNAPSHOT
6/4/2017    Dane Bernardo Salazar  [address] [place] [id number]    Dear Dane:    [facility] wants to ensure your discharge home is safe and you or your loved ones have had all of your questions answered regarding your care after you leave the hospital.    Below is a list of resources and contact information should you have any questions regarding your hospital stay, follow-up instructions, or active medical symptoms.    Questions or Concerns Regarding… Contact   Medical Questions Related to Your Discharge  (7 days a week, 8am-5pm) Contact a Nurse Care Coordinator   [phone]   Medical Questions Not Related to Your Discharge  (24 hours a day / 7 days a week)  Contact the Nurse Health Line   [phone]    Medications or Discharge Instructions Refer to your discharge packet   or contact your [facility] Primary Care Provider   [phone]   Follow-up Appointment(s) Schedule your appointment via [employer]   or contact Scheduling [phone]   Billing Review your statement via [employer]  or contact Billing [phone]   Medical Records Review your records via [employer]   or contact Medical Records [phone]     You may receive a telephone call within two days of discharge. This call is to make certain you understand your discharge instructions and have the opportunity to have any questions answered. You can also easily access your medical information, test results and upcoming appointments via the [employer] free online health management tool. You can learn more and sign up at [employer]. For assistance setting up your [employer] account, please call [phone].    Once again, we want to ensure your discharge home is safe and that you have a clear understanding of any next steps in your care. If you have any questions or concerns, please do not hesitate to contact us, we are here for you. Thank you for choosing [facility] for your healthcare needs.    Sincerely,    Your [facility] Healthcare Team

## 2017-06-03 NOTE — ED AVS SNAPSHOT
Home Care Instructions                                                                                                                Dane Lincoln   MRN: [id number]    Department:  [facility], Emergency Dept   Date of Visit:  6/3/2017            [facility], Emergency Dept    [address] [place] [address]    Phone:  [phone]      You were seen by     Diane Grey M.D.      Your Diagnosis Was     Schizophrenia, unspecified type     F20.9       Follow-up Information     1. Follow up with [facility], Emergency Dept.    Specialty:  Emergency Medicine    Why:  Return for thoughts of wanting to hurt yourself or others, or other concerns    Contact information    [address] [place] [id number] [phone]      Medication Information     Review all of your home medications and newly ordered medications with your primary doctor and/or pharmacist as soon as possible. Follow medication instructions as directed by your doctor and/or pharmacist.     Please keep your complete medication list with you and share with your physician. Update the information when medications are discontinued, doses are changed, or new medications (including over-the-counter products) are added; and carry medication information at all times in the event of emergency situations.               Medication List      ASK your doctor about these medications        Instructions    Morning Afternoon Evening Bedtime    albuterol 108 (90 BASE) MCG/ACT Aers inhalation aerosol        Inhale 2 Puffs by mouth every 6 hours as needed for Shortness of Breath. You may use it up to every 4 hours but should see a physician if you need it more often than every 4 hours.   Dose:  2 Puff                                Procedures and tests performed during your visit     [facility] consult    POC BREATHALIZER        Discharge Instructions       Normal Exam, Adult  You were seen and  examined today in our facility. Our caregiver found nothing wrong on the exam. If testing was done such as lab work or x-rays, they did not indicate enough wrong to suggest that treatment should be given. The caregiver then must decide after testing is finished if your concern is a physical problem or illness that needs treatment. Today no treatable problem was found. Even if reassurance was given, if you feel you are getting worse when you get home make sure you call back or return to our department.  For the protection of your privacy, test results can not be given over the phone. Make sure you receive the results of your test. Ask as to how these results are to be obtained if you have not been informed. It is your responsibility to obtain your test results.  Your condition can change over time. Sometimes it takes more than one visit to determine the cause of your symptoms. It is important that you monitor your condition for any changes.  SEEK IMMEDIATE MEDICAL CARE IF:  · You develop an oral temperature above 102° F (38.9° C), which lasts for more than 2 days, not controlled by medications. Only take over-the-counter or prescription medicines for pain, discomfort, or fever as directed by your caregiver.   · You develop a loss of appetite or start throwing up (vomiting).   · You develop a rash, cough, belly (abdominal) pain, earache, headache, or develop pain in neck, muscles, or joints.   · The problem or problems which brought you to our facility become worse or are a cause of more concern.   If we have told you today your exam and tests are normal, and a short while later you feel this is not right, please seek medical attention so you may be rechecked.  Document Released: 04/01/2002 Document Revised: 03/11/2013 Document Reviewed: 07/24/2009  ExitCare® Patient Information ©2013 [employer].          Patient Information     Patient Information    Following emergency treatment: all patient requiring follow-up  care must return either to a private physician or a clinic if your condition worsens before you are able to obtain further medical attention, please return to the emergency room.     Billing Information    At [facility], we work to make the billing process streamlined for our patients.  Our Representatives are here to answer any questions you may have regarding your hospital bill.  If you have insurance coverage and have supplied your insurance information to us, we will submit a claim to your insurer on your behalf.  Should you have any questions regarding your bill, we can be reached online or by phone as follows:  Online: You are able pay your bills online or live chat with our representatives about any billing questions you may have. We are here to help Monday - Friday from 8:00am to 7:30pm and 9:00am - 12:00pm on Saturdays.  Please visit [email or web address]  for more information.   Phone:  [phone] or [phone]    Please note that your emergency physician, surgeon, pathologist, radiologist, anesthesiologist, and other specialists are not employed by [facility] and will therefore bill separately for their services.  Please contact them directly for any questions concerning their bills at the numbers below:     Emergency Physician Services:  [phone]  [facility]:  [phone]  [facility]:  [phone]  [facility]:  [phone]    1. Your final bill may vary from the amount quoted upon discharge if all procedures are not complete at that time, or if your doctor has additional procedures of which we are not aware. You will receive an additional bill if you return to the Emergency Department at [facility] for suture removal regardless of the facility of which the sutures were placed.     2. Please arrange for settlement of this account at the emergency registration.    3. All self-pay accounts are due in full at  the time of treatment.  If you are unable to meet this obligation then payment is expected within 4-5 days.     4. If you have had radiology studies (CT, X-ray, Ultrasound, MRI), you have received a preliminary result during your emergency department visit. Please contact the radiology department [phone] to receive a copy of your final result. Please discuss the Final result with your primary physician or with the follow up physician provided.     Crisis Hotline:  [facility] Crisis Hotline:  1-091-DLCIDHQ or 1-569.881.9909  [place] Crisis Hotline:    1-475.921.3549 or 202-921-8476         ED Discharge Follow Up Questions    1. In order to provide you with very good care, we would like to follow up with a phone call in the next few days.  May we have your permission to contact you?     YES /  NO    2. What is the best phone number to call you? (       )_____-__________    3. What is the best time to call you?      Morning  /  Afternoon  /  Evening                   Patient Signature:  ____________________________________________________________    Date:  ____________________________________________________________
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
## 2017-06-04 NOTE — DISCHARGE INSTRUCTIONS
Normal Exam, Adult  You were seen and examined today in our facility. Our caregiver found nothing wrong on the exam. If testing was done such as lab work or x-rays, they did not indicate enough wrong to suggest that treatment should be given. The caregiver then must decide after testing is finished if your concern is a physical problem or illness that needs treatment. Today no treatable problem was found. Even if reassurance was given, if you feel you are getting worse when you get home make sure you call back or return to our department.  For the protection of your privacy, test results can not be given over the phone. Make sure you receive the results of your test. Ask as to how these results are to be obtained if you have not been informed. It is your responsibility to obtain your test results.  Your condition can change over time. Sometimes it takes more than one visit to determine the cause of your symptoms. It is important that you monitor your condition for any changes.  SEEK IMMEDIATE MEDICAL CARE IF:  · You develop an oral temperature above 102° F (38.9° C), which lasts for more than 2 days, not controlled by medications. Only take over-the-counter or prescription medicines for pain, discomfort, or fever as directed by your caregiver.   · You develop a loss of appetite or start throwing up (vomiting).   · You develop a rash, cough, belly (abdominal) pain, earache, headache, or develop pain in neck, muscles, or joints.   · The problem or problems which brought you to our facility become worse or are a cause of more concern.   If we have told you today your exam and tests are normal, and a short while later you feel this is not right, please seek medical attention so you may be rechecked.  Document Released: 04/01/2002 Document Revised: 03/11/2013 Document Reviewed: 07/24/2009  [employer]® Patient Information ©2013 [employer].
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
## 2017-06-04 NOTE — ED NOTES
Pt still awaiting ALERT team. ALERT team at [facility] at this time. Pt to the bathroom to void urine. Refuses to provide a UA sample.

## 2017-06-19 NOTE — ED NOTES
PT states he wants to leave. Patient steady and calm at this time sitting up on the edge of the gurney. Requesting to speak with ERP. ERp aware.

## 2017-06-19 NOTE — ED AVS SNAPSHOT
[employer] Access Code: [email or web address]  Expires: 6/29/2017  3:58 AM    Your email address is not on file at [facility].  Email Addresses are required for you to sign up for [employer], please contact [phone] to verify your personal information and to provide your email address prior to attempting to register for [employer].    Dane Lincoln  [address] [place] [address], [place] [address]    [employer]  A secure, online tool to manage your health information     [facility]’s [employer]® is a secure, online tool that connects you to your personalized health information from the privacy of your home -- day or night - making it very easy for you to manage your healthcare. Once the activation process is completed, you can even access your medical information using the [employer] allyson, which is available for free in the Apple Allyson store or Google Play store.     To learn more about [employer], visit [email or web address]    There are two levels of access available (as shown below):   My Chart Features  [facility] Primary Care Doctor [facility]  Specialists [facility]  Urgent  Care Non-[facility] Primary Care Doctor   Email your healthcare team securely and privately 24/7 X X X    Manage appointments: schedule your next appointment; view details of past/upcoming appointments X      Request prescription refills. X      View recent personal medical records, including lab and immunizations X X X X   View health record, including health history, allergies, medications X X X X   Read reports about your outpatient visits, procedures, consult and ER notes X X X X   See your discharge summary, which is a recap of your hospital and/or ER visit that includes your diagnosis, lab results, and care plan X X  X     How to register for [employer]:  Once your e-mail address has been verified, follow the following steps to sign up for [employer].     1. Go to  [email or web address]  2. Click on the Sign Up Now box, which takes you to the New Member Sign Up page. You will need  to provide the following information:  a. Enter your [employer] Access Code exactly as it appears at the top of this page. (You will not need to use this code after you’ve completed the sign-up process. If you do not sign up before the expiration date, you must request a new code.)   b. Enter your date of birth.   c. Enter your home email address.   d. Click Submit, and follow the next screen’s instructions.  3. Create a [employer] ID. This will be your [employer] login ID and cannot be changed, so think of one that is secure and easy to remember.  4. Create a [employer] password. You can change your password at any time.  5. Enter your Password Reset Question and Answer. This can be used at a later time if you forget your password.   6. Enter your e-mail address. This allows you to receive e-mail notifications when new information is available in [employer].  7. Click Sign Up. You can now view your health information.    For assistance activating your [employer] account, call [phone]
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
## 2017-06-19 NOTE — ED AVS SNAPSHOT
6/19/2017    Dane Lincoln  [address] [place] [id number]    Dear Dane:    [facility] wants to ensure your discharge home is safe and you or your loved ones have had all of your questions answered regarding your care after you leave the hospital.    Below is a list of resources and contact information should you have any questions regarding your hospital stay, follow-up instructions, or active medical symptoms.    Questions or Concerns Regarding… Contact   Medical Questions Related to Your Discharge  (7 days a week, 8am-5pm) Contact a Nurse Care Coordinator   [phone]   Medical Questions Not Related to Your Discharge  (24 hours a day / 7 days a week)  Contact the Nurse Health Line   [phone]    Medications or Discharge Instructions Refer to your discharge packet   or contact your [facility] Primary Care Provider   [phone]   Follow-up Appointment(s) Schedule your appointment via [employer]   or contact Scheduling [phone]   Billing Review your statement via [employer]  or contact Billing [phone]   Medical Records Review your records via [employer]   or contact Medical Records [phone]     You may receive a telephone call within two days of discharge. This call is to make certain you understand your discharge instructions and have the opportunity to have any questions answered. You can also easily access your medical information, test results and upcoming appointments via the [employer]b free online health management tool. You can learn more and sign up at [email or web address]. For assistance setting up your Quibb account, please call [phone].    Once again, we want to ensure your discharge home is safe and that you have a clear understanding of any next steps in your care. If you have any questions or concerns, please do not hesitate to contact us, we are here for you. Thank you for choosing [facility] for your healthcare needs.    Sincerely,    Your [facility] Healthcare Team

## 2017-06-19 NOTE — ED NOTES
Pt refused all vital signs and wants to leave. Patient discharged by erp.    Pt ambualtory to discharge with . Patient farm more cooperative at this time. I encouraged patient to follo wup with hopes and to seek help with alcoholism. NAD or deficits noted at time of discharge
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
## 2017-06-19 NOTE — DISCHARGE INSTRUCTIONS
Alcohol Abuse and Nutrition  Alcohol abuse is any pattern of alcohol consumption that harms your health, relationships, or work. Alcohol abuse can affect how your body breaks down and absorbs nutrients from food by causing your liver to work abnormally. Additionally, many people who abuse alcohol do not eat enough carbohydrates, protein, fat, vitamins, and minerals. This can cause poor nutrition (malnutrition) and a lack of nutrients (nutrient deficiencies), which can lead to further complications.  Nutrients that are commonly lacking (deficient) among people who abuse alcohol include:  · Vitamins.  · Vitamin A. This is stored in your liver. It is important for your vision, metabolism, and ability to fight off infections (immunity).  · B vitamins. These include vitamins such as folate, thiamin, and niacin. These are important in new cell growth and maintenance.  · Vitamin C. This plays an important role in iron absorption, wound healing, and immunity.  · Vitamin D. This is produced by your liver, but you can also get vitamin D from food. Vitamin D is necessary for your body to absorb and use calcium.  · Minerals.  · Calcium. This is important for your bones and your heart and blood vessel (cardiovascular) function.  · Iron. This is important for blood, muscle, and nervous system functioning.  · Magnesium. This plays an important role in muscle and nerve function, and it helps to control blood sugar and blood pressure.  · Zinc. This is important for the normal function of your nervous system and digestive system (gastrointestinal tract).  Nutrition is an essential component of therapy for alcohol abuse. Your health care provider or dietitian will work with you to design a plan that can help restore nutrients to your body and prevent potential complications.  WHAT IS MY PLAN?  Your dietitian may develop a specific diet plan that is based on your condition and any other complications you may have. A diet plan will  commonly include:  · A balanced diet.  ¨ Grains: 6-8 oz per day.  ¨ Vegetables: 2-3 cups per day.  ¨ Fruits: 1-2 cups per day.  ¨ Meat and other protein: 5-6 oz per day.  ¨ Dairy: 2-3 cups per day.  · Vitamin and mineral supplements.  WHAT DO I NEED TO KNOW ABOUT ALCOHOL AND NUTRITION?  · Consume foods that are high in antioxidants, such as grapes, berries, nuts, green tea, and dark green and orange vegetables. This can help to counteract some of the stress that is placed on your liver by consuming alcohol.  · Avoid food and drinks that are high in fat and sugar. Foods such as sugared soft drinks, salty snack foods, and candy contain empty calories. This means that they lack important nutrients such as protein, fiber, and vitamins.  · Eat frequent meals and snacks. Try to eat 5-6 small meals each day.  · Eat a variety of fresh fruits and vegetables each day. This will help you get plenty of water, fiber, and vitamins in your diet.  · Drink plenty of water and other clear fluids. Try to drink at least 48-64 oz (1.5-2 L) of water per day.  · If you are a vegetarian, eat a variety of protein-rich foods. Pair whole grains with plant-based proteins at meals and snacks to obtain the greatest nutrient benefit from your food. For example, eat rice with beans, put peanut butter on whole-grain toast, or eat oatmeal with sunflower seeds.  · Soak beans and whole grains overnight before cooking. This can help your body to absorb the nutrients more easily.  · Include foods fortified with vitamins and minerals in your diet. Commonly fortified foods include milk, orange juice, cereal, and bread.  · If you are malnourished, your dietitian may recommend a high-protein, high-calorie diet. This may include:  · 2,000-3,000 calories (kilocalories) per day.  ·  grams of protein per day.  · Your health care provider may recommend a complete nutritional supplement beverage. This can help to restore calories, protein, and vitamins to  your body. Depending on your condition, you may be advised to consume this instead of or in addition to meals.  · Limit your intake of caffeine. Replace drinks like coffee and black tea with decaffeinated coffee and herbal tea.  · Eat a variety of foods that are high in omega fatty acids. These include fish, nuts and seeds, and soybeans. These foods may help your liver to recover and may also stabilize your mood.  · Certain medicines may cause changes in your appetite, taste, and weight. Work with your health care provider and dietitian to make any adjustments to your medicines and diet plan.  · Include other healthy lifestyle choices in your daily routine.  · Be physically active.  · Get enough sleep.  · Spend time doing activities that you enjoy.  · If you are unable to take in enough food and calories by mouth, your health care provider may recommend a feeding tube. This is a tube that passes through your nose and throat, directly into your stomach. Nutritional supplement beverages can be given to you through the feeding tube to help you get the nutrients you need.  · Take vitamin or mineral supplements as recommended by your health care provider.  WHAT FOODS CAN I EAT?  Grains  Enriched pasta. Enriched rice. Fortified whole-grain bread. Fortified whole-grain cereal. Barley. Brown rice. Quinoa. Millet.  Vegetables  All fresh, frozen, and canned vegetables. Spinach. Kale. Artichoke. Carrots. Winter squash and pumpkin. Sweet potatoes. Broccoli. Cabbage. Cucumbers. Tomatoes. Sweet peppers. Green beans. Peas. Corn.  Fruits  All fresh and frozen fruits. Berries. Grapes. Edson. Papaya. Guava. Cherries. Apples. Bananas. Peaches. Plums. Pineapple. Watermelon. Cantaloupe. Oranges. Avocado.  Meats and Other Protein Sources  Beef liver. Lean beef. Pork. Fresh and canned chicken. Fresh fish. Oysters. Sardines. Canned tuna. Shrimp. Eggs with yolks. Nuts and seeds. Peanut butter. Beans and lentils. Soybeans.  Tofu.  Dairy  Whole, low-fat, and nonfat milk. Whole, low-fat, and nonfat yogurt. Cottage cheese. Sour cream. Hard and soft cheeses.  Beverages  Water. Herbal tea. Decaffeinated coffee. Decaffeinated green tea. 100% fruit juice. 100% vegetable juice. Instant breakfast shakes.  Condiments  Ketchup. Mayonnaise. Mustard. Salad dressing. Barbecue sauce.  Sweets and Desserts  Sugar-free ice cream. Sugar-free pudding. Sugar-free gelatin.  Fats and Oils  Butter. Vegetable oil, flaxseed oil, olive oil, and walnut oil.  Other  Complete nutrition shakes. Protein bars. Sugar-free gum.  The items listed above may not be a complete list of recommended foods or beverages. Contact your dietitian for more options.  WHAT FOODS ARE NOT RECOMMENDED?  Grains  Sugar-sweetened breakfast cereals. Flavored instant oatmeal. Fried breads.  Vegetables  Breaded or deep-fried vegetables.  Fruits  Dried fruit with added sugar. Candied fruit. Canned fruit in syrup.  Meats and Other Protein Sources  Breaded or deep-fried meats.  Dairy  Flavored milks. Fried cheese curds or fried cheese sticks.  Beverages  Alcohol. Sugar-sweetened soft drinks. Sugar-sweetened tea. Caffeinated coffee and tea.  Condiments  Sugar. Honey. Agave nectar. Molasses.  Sweets and Desserts  Chocolate. Cake. Cookies. Candy.  Other  Potato chips. Pretzels. Salted nuts. Candied nuts.  The items listed above may not be a complete list of foods and beverages to avoid. Contact your dietitian for more information.     This information is not intended to replace advice given to you by your health care provider. Make sure you discuss any questions you have with your health care provider.     Document Released: 10/12/2006 Document Revised: 01/08/2016 Document Reviewed: 07/21/2015  [employer] Interactive Patient Education ©2016 [employer]      Facial or Scalp Contusion  A facial or scalp contusion is a deep bruise on the face or head. Injuries to the face and head generally cause a lot  of swelling, especially around the eyes. Contusions are the result of an injury that caused bleeding under the skin. The contusion may turn blue, purple, or yellow. Minor injuries will give you a painless contusion, but more severe contusions may stay painful and swollen for a few weeks.   CAUSES   A facial or scalp contusion is caused by a blunt injury or trauma to the face or head area.   SIGNS AND SYMPTOMS   · Swelling of the injured area.    · Discoloration of the injured area.    · Tenderness, soreness, or pain in the injured area.    DIAGNOSIS   The diagnosis can be made by taking a medical history and doing a physical exam. An X-ray exam, CT scan, or MRI may be needed to determine if there are any associated injuries, such as broken bones (fractures).  TREATMENT   Often, the best treatment for a facial or scalp contusion is applying cold compresses to the injured area. Over-the-counter medicines may also be recommended for pain control.   HOME CARE INSTRUCTIONS   · Only take over-the-counter or prescription medicines as directed by your health care provider.    · Apply ice to the injured area.    ¨ Put ice in a plastic bag.    ¨ Place a towel between your skin and the bag.    ¨ Leave the ice on for 20 minutes, 2-3 times a day.    SEEK MEDICAL CARE IF:  · You have bite problems.    · You have pain with chewing.    · You are concerned about facial defects.  SEEK IMMEDIATE MEDICAL CARE IF:  · You have severe pain or a headache that is not relieved by medicine.    · You have unusual sleepiness, confusion, or personality changes.    · You throw up (vomit).    · You have a persistent nosebleed.    · You have double vision or blurred vision.    · You have fluid drainage from your nose or ear.    · You have difficulty walking or using your arms or legs.    MAKE SURE YOU:   · Understand these instructions.  · Will watch your condition.  · Will get help right away if you are not doing well or get worse.     This  information is not intended to replace advice given to you by your health care provider. Make sure you discuss any questions you have with your health care provider.     Document Released: 01/25/2006 Document Revised: 01/08/2016 Document Reviewed: 07/31/2014  [employer] Interactive Patient Education ©2016 [employer]

## 2017-06-19 NOTE — ED AVS SNAPSHOT
Home Care Instructions                                                                                                                Dane Lincoln   MRN: [id number]    Department:  [facility], Emergency Dept   Date of Visit:  6/19/2017            [facility], Emergency Dept    [address] [address]    Phone:  [phone]      You were seen by     Pedrito Joyce D.O.      Your Diagnosis Was     Alcohol intoxication, uncomplicated     F10.120       Follow-up Information     1. Follow up with [facility], Emergency Dept.    Specialty:  Emergency Medicine    Why:  If symptoms worsen    Contact information    [address] [address] [phone]        2. Schedule an appointment as soon as possible for a visit with [facility].    Contact information    [address] [place] [address] [phone]      Medication Information     Review all of your home medications and newly ordered medications with your primary doctor and/or pharmacist as soon as possible. Follow medication instructions as directed by your doctor and/or pharmacist.     Please keep your complete medication list with you and share with your physician. Update the information when medications are discontinued, doses are changed, or new medications (including over-the-counter products) are added; and carry medication information at all times in the event of emergency situations.               Medication List      ASK your doctor about these medications        Instructions    Morning Afternoon Evening Bedtime    albuterol 108 (90 BASE) MCG/ACT Aers inhalation aerosol        Inhale 2 Puffs by mouth every 6 hours as needed for Shortness of Breath. You may use it up to every 4 hours but should see a physician if you need it more often than every 4 hours.   Dose:  2 Puff                                  Discharge Instructions       Alcohol  Abuse and Nutrition  Alcohol abuse is any pattern of alcohol consumption that harms your health, relationships, or work. Alcohol abuse can affect how your body breaks down and absorbs nutrients from food by causing your liver to work abnormally. Additionally, many people who abuse alcohol do not eat enough carbohydrates, protein, fat, vitamins, and minerals. This can cause poor nutrition (malnutrition) and a lack of nutrients (nutrient deficiencies), which can lead to further complications.  Nutrients that are commonly lacking (deficient) among people who abuse alcohol include:  · Vitamins.  · Vitamin A. This is stored in your liver. It is important for your vision, metabolism, and ability to fight off infections (immunity).  · B vitamins. These include vitamins such as folate, thiamin, and niacin. These are important in new cell growth and maintenance.  · Vitamin C. This plays an important role in iron absorption, wound healing, and immunity.  · Vitamin D. This is produced by your liver, but you can also get vitamin D from food. Vitamin D is necessary for your body to absorb and use calcium.  · Minerals.  · Calcium. This is important for your bones and your heart and blood vessel (cardiovascular) function.  · Iron. This is important for blood, muscle, and nervous system functioning.  · Magnesium. This plays an important role in muscle and nerve function, and it helps to control blood sugar and blood pressure.  · Zinc. This is important for the normal function of your nervous system and digestive system (gastrointestinal tract).  Nutrition is an essential component of therapy for alcohol abuse. Your health care provider or dietitian will work with you to design a plan that can help restore nutrients to your body and prevent potential complications.  WHAT IS MY PLAN?  Your dietitian may develop a specific diet plan that is based on your condition and any other complications you may have. A diet plan will commonly  include:  · A balanced diet.  ¨ Grains: 6-8 oz per day.  ¨ Vegetables: 2-3 cups per day.  ¨ Fruits: 1-2 cups per day.  ¨ Meat and other protein: 5-6 oz per day.  ¨ Dairy: 2-3 cups per day.  · Vitamin and mineral supplements.  WHAT DO I NEED TO KNOW ABOUT ALCOHOL AND NUTRITION?  · Consume foods that are high in antioxidants, such as grapes, berries, nuts, green tea, and dark green and orange vegetables. This can help to counteract some of the stress that is placed on your liver by consuming alcohol.  · Avoid food and drinks that are high in fat and sugar. Foods such as sugared soft drinks, salty snack foods, and candy contain empty calories. This means that they lack important nutrients such as protein, fiber, and vitamins.  · Eat frequent meals and snacks. Try to eat 5-6 small meals each day.  · Eat a variety of fresh fruits and vegetables each day. This will help you get plenty of water, fiber, and vitamins in your diet.  · Drink plenty of water and other clear fluids. Try to drink at least 48-64 oz (1.5-2 L) of water per day.  · If you are a vegetarian, eat a variety of protein-rich foods. Pair whole grains with plant-based proteins at meals and snacks to obtain the greatest nutrient benefit from your food. For example, eat rice with beans, put peanut butter on whole-grain toast, or eat oatmeal with sunflower seeds.  · Soak beans and whole grains overnight before cooking. This can help your body to absorb the nutrients more easily.  · Include foods fortified with vitamins and minerals in your diet. Commonly fortified foods include milk, orange juice, cereal, and bread.  · If you are malnourished, your dietitian may recommend a high-protein, high-calorie diet. This may include:  · 2,000-3,000 calories (kilocalories) per day.  ·  grams of protein per day.  · Your health care provider may recommend a complete nutritional supplement beverage. This can help to restore calories, protein, and vitamins to your body.  Depending on your condition, you may be advised to consume this instead of or in addition to meals.  · Limit your intake of caffeine. Replace drinks like coffee and black tea with decaffeinated coffee and herbal tea.  · Eat a variety of foods that are high in omega fatty acids. These include fish, nuts and seeds, and soybeans. These foods may help your liver to recover and may also stabilize your mood.  · Certain medicines may cause changes in your appetite, taste, and weight. Work with your health care provider and dietitian to make any adjustments to your medicines and diet plan.  · Include other healthy lifestyle choices in your daily routine.  · Be physically active.  · Get enough sleep.  · Spend time doing activities that you enjoy.  · If you are unable to take in enough food and calories by mouth, your health care provider may recommend a feeding tube. This is a tube that passes through your nose and throat, directly into your stomach. Nutritional supplement beverages can be given to you through the feeding tube to help you get the nutrients you need.  · Take vitamin or mineral supplements as recommended by your health care provider.  WHAT FOODS CAN I EAT?  Grains  Enriched pasta. Enriched rice. Fortified whole-grain bread. Fortified whole-grain cereal. Barley. Brown rice. Quinoa. Millet.  Vegetables  All fresh, frozen, and canned vegetables. Spinach. Kale. Artichoke. Carrots. Winter squash and pumpkin. Sweet potatoes. Broccoli. Cabbage. Cucumbers. Tomatoes. Sweet peppers. Green beans. Peas. Corn.  Fruits  All fresh and frozen fruits. Berries. Grapes. [place]. Papaya. Guava. Cherries. Apples. Bananas. Peaches. Plums. Pineapple. Watermelon. Cantaloupe. Oranges. Avocado.  Meats and Other Protein Sources  Beef liver. Lean beef. Pork. Fresh and canned chicken. Fresh fish. Oysters. Sardines. Canned tuna. Shrimp. Eggs with yolks. Nuts and seeds. Peanut butter. Beans and lentils. Soybeans. Tofu.  Dairy  Whole, low-fat,  and nonfat milk. Whole, low-fat, and nonfat yogurt. Cottage cheese. Sour cream. Hard and soft cheeses.  Beverages  Water. Herbal tea. Decaffeinated coffee. Decaffeinated green tea. 100% fruit juice. 100% vegetable juice. Instant breakfast shakes.  Condiments  Ketchup. Mayonnaise. Mustard. Salad dressing. Barbecue sauce.  Sweets and Desserts  Sugar-free ice cream. Sugar-free pudding. Sugar-free gelatin.  Fats and Oils  Butter. Vegetable oil, flaxseed oil, olive oil, and walnut oil.  Other  Complete nutrition shakes. Protein bars. Sugar-free gum.  The items listed above may not be a complete list of recommended foods or beverages. Contact your dietitian for more options.  WHAT FOODS ARE NOT RECOMMENDED?  Grains  Sugar-sweetened breakfast cereals. Flavored instant oatmeal. Fried breads.  Vegetables  Breaded or deep-fried vegetables.  Fruits  Dried fruit with added sugar. Candied fruit. Canned fruit in syrup.  Meats and Other Protein Sources  Breaded or deep-fried meats.  Dairy  Flavored milks. Fried cheese curds or fried cheese sticks.  Beverages  Alcohol. Sugar-sweetened soft drinks. Sugar-sweetened tea. Caffeinated coffee and tea.  Condiments  Sugar. Honey. Agave nectar. Molasses.  Sweets and Desserts  Chocolate. Cake. Cookies. Candy.  Other  Potato chips. Pretzels. Salted nuts. Candied nuts.  The items listed above may not be a complete list of foods and beverages to avoid. Contact your dietitian for more information.     This information is not intended to replace advice given to you by your health care provider. Make sure you discuss any questions you have with your health care provider.     Document Released: 10/12/2006 Document Revised: 01/08/2016 Document Reviewed: 07/21/2015  [employer] Interactive Patient Education ©2016 [employer]      Facial or Scalp Contusion  A facial or scalp contusion is a deep bruise on the face or head. Injuries to the face and head generally cause a lot of swelling, especially  around the eyes. Contusions are the result of an injury that caused bleeding under the skin. The contusion may turn blue, purple, or yellow. Minor injuries will give you a painless contusion, but more severe contusions may stay painful and swollen for a few weeks.   CAUSES   A facial or scalp contusion is caused by a blunt injury or trauma to the face or head area.   SIGNS AND SYMPTOMS   · Swelling of the injured area.    · Discoloration of the injured area.    · Tenderness, soreness, or pain in the injured area.    DIAGNOSIS   The diagnosis can be made by taking a medical history and doing a physical exam. An X-ray exam, CT scan, or MRI may be needed to determine if there are any associated injuries, such as broken bones (fractures).  TREATMENT   Often, the best treatment for a facial or scalp contusion is applying cold compresses to the injured area. Over-the-counter medicines may also be recommended for pain control.   HOME CARE INSTRUCTIONS   · Only take over-the-counter or prescription medicines as directed by your health care provider.    · Apply ice to the injured area.    ¨ Put ice in a plastic bag.    ¨ Place a towel between your skin and the bag.    ¨ Leave the ice on for 20 minutes, 2-3 times a day.    SEEK MEDICAL CARE IF:  · You have bite problems.    · You have pain with chewing.    · You are concerned about facial defects.  SEEK IMMEDIATE MEDICAL CARE IF:  · You have severe pain or a headache that is not relieved by medicine.    · You have unusual sleepiness, confusion, or personality changes.    · You throw up (vomit).    · You have a persistent nosebleed.    · You have double vision or blurred vision.    · You have fluid drainage from your nose or ear.    · You have difficulty walking or using your arms or legs.    MAKE SURE YOU:   · Understand these instructions.  · Will watch your condition.  · Will get help right away if you are not doing well or get worse.     This information is not intended to  replace advice given to you by your health care provider. Make sure you discuss any questions you have with your health care provider.     Document Released: 01/25/2006 Document Revised: 01/08/2016 Document Reviewed: 07/31/2014  [employer] Interactive Patient Education ©2016 [employer]            Patient Information     Patient Information    Following emergency treatment: all patient requiring follow-up care must return either to a private physician or a clinic if your condition worsens before you are able to obtain further medical attention, please return to the emergency room.     Billing Information    At [facility], we work to make the billing process streamlined for our patients.  Our Representatives are here to answer any questions you may have regarding your hospital bill.  If you have insurance coverage and have supplied your insurance information to us, we will submit a claim to your insurer on your behalf.  Should you have any questions regarding your bill, we can be reached online or by phone as follows:  Online: You are able pay your bills online or live chat with our representatives about any billing questions you may have. We are here to help Monday - Friday from 8:00am to 7:30pm and 9:00am - 12:00pm on Saturdays.  Please visit [email or web address]  for more information.   Phone:  [phone] or [phone]    Please note that your emergency physician, surgeon, pathologist, radiologist, anesthesiologist, and other specialists are not employed by [facility] and will therefore bill separately for their services.  Please contact them directly for any questions concerning their bills at the numbers below:     Emergency Physician Services:  [phone]  [facility]:  [phone]  [facility]:  [phone]  [facility]:  [phone]    1. Your final bill may vary from the amount quoted upon discharge if all procedures  are not complete at that time, or if your doctor has additional procedures of which we are not aware. You will receive an additional bill if you return to the Emergency Department at [facility] for suture removal regardless of the facility of which the sutures were placed.     2. Please arrange for settlement of this account at the emergency registration.    3. All self-pay accounts are due in full at the time of treatment.  If you are unable to meet this obligation then payment is expected within 4-5 days.     4. If you have had radiology studies (CT, X-ray, Ultrasound, MRI), you have received a preliminary result during your emergency department visit. Please contact the radiology department [phone] to receive a copy of your final result. Please discuss the Final result with your primary physician or with the follow up physician provided.     Crisis Hotline:  [place] Crisis Hotline:  5-787-EJNUMOF or 1-103.868.6666  [place] Crisis Hotline:    1-253.551.7584 or 453-939-8797         ED Discharge Follow Up Questions    1. In order to provide you with very good care, we would like to follow up with a phone call in the next few days.  May we have your permission to contact you?     YES /  NO    2. What is the best phone number to call you? (       )_____-__________    3. What is the best time to call you?      Morning  /  Afternoon  /  Evening                   Patient Signature:  ____________________________________________________________    Date:  ____________________________________________________________
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
## 2017-07-17 PROBLEM — R57.1 HYPOVOLEMIC SHOCK (HCC): Status: ACTIVE | Noted: 2017-01-01

## 2017-07-17 PROBLEM — D62 ACUTE BLOOD LOSS ANEMIA: Status: ACTIVE | Noted: 2017-01-01

## 2017-07-17 PROBLEM — F10.20 ALCOHOLISM (HCC): Status: ACTIVE | Noted: 2017-01-01

## 2017-07-17 PROBLEM — K92.2 ACUTE GASTROINTESTINAL HEMORRHAGE: Status: ACTIVE | Noted: 2017-01-01

## 2017-07-17 PROBLEM — N17.9 ARF (ACUTE RENAL FAILURE) (HCC): Status: ACTIVE | Noted: 2017-01-01

## 2017-07-17 NOTE — ASSESSMENT & PLAN NOTE
Alchoholic  - with hepatic encephalopathy, mentation has improved but he remains encephalopathic.   - continue rifaximin and lactulose

## 2017-07-17 NOTE — CONSULTS
Gastroenterology Consult:    Edmond Terry  Date & Time note created:    7/17/2017   3:18 PM     Visit Time:  2:30 pm    Patient ID:   Name:             Dane Lincoln   YOB: 1960  Age:                 57 y.o.  male   MRN:               [id number]    Attending: Dr. Moss  Resident: Dr. Edmond Terry  ( PGY1)  ###############################################################  ID:  56 yo M w/ HX of alcohol abuse and two prior EGDs with banding presents to the ED with hematemesis.    Interval History:  Mr. Lincoln came to the hospital because he had bright red vomiting yesterday and today.  Yesterday, he first felt dizzy, fell, then began vomiting blood.  This is the first time it has happened to him.  He also has epigastric abdomenal pain that extends up to just below his sternum.  It started the same time he vomited blood.  His last bowel movement was also last night.  It was loose and dark in color.  His last drink was 2 days ago when he had a 6 pack of beer.  He has been drinking in this fashion for much of his life.    He denies jaundice and abdominal distension.      While in the ED he vomited 600 ml of blood and received multiple fluid boluses and a , he had and EGD where the likely offending varice was identified and banded.  Multiple other varices were observed.    PMHX/Meds/Surg:  Two prior EGDs.  The last one happened 6 months.      Review of Systems:      Constitutional: (+) Chills (he has received multiple fluid boluses and blood transfusion.  Denies fevers  Cardiovascular: Denies chest pain or palpitations   Respiratory: Denies shortness of breath or cough  Gastrointestinal/Hepatic: (+) Hematemesis and epigastric abdominal pain.  Denies nausea, diarrhea, constipation  Genitourinary: Denies dysuria or frequency  Musculoskeletal/Rheum: Denies  joint or muscle pain  Neurological: Denies focal weakness/parasthesias  All other systems were reviewed and are negative (AMA/CMS criteria)       "        #################################################################  Physical Exam:  Vitals/ General Appearance: Pt is in distress and shivering  Weight/BMI: Body mass index is 30.42 kg/(m^2).  Blood pressure 120/78, pulse 93, temperature 36.9 °C (98.4 °F), resp. rate 19, height 1.727 m (5' 8\"), weight 90.719 kg (200 lb), SpO2 99 %.  Filed Vitals:    07/17/17 1456 07/17/17 1503 07/17/17 1506 07/17/17 1510   BP:   120/78    Pulse: 97 100 103 93   Temp:   36.9 °C (98.4 °F)    Resp: 23 22 16 19   Height:       Weight:       SpO2: 100% 99% 99% 99%     Oxygen Therapy:  Pulse Oximetry: 99 %, O2 (LPM): 6, O2 Delivery: Nasal Cannula    Constitutional: Pt is in distress and shivering.   HENMT:  NC/AT. MMM. No oral exudates.  Eyes:  PERRL, EOMI, Conjunctiva normal.   Cardiovascular:  Normal heart rate, Normal rhythm, No murmurs, No rubs, No gallops.    Extremities: Encrusted blood on feet form hematemesis, no cyanosis or edema.  Lungs:  CTABL, no w/r/r. No crackles.   Abdomen: Encrusted blood on abdomen from hematemesis, epigastric tenderness, soft, , ND. +BS.  Skin: Warm, Dry, No erythema, No rash.  Neurologic: AAOx3, no focal deficits.     #################################################################  Lab Data Review:  Recent Results (from the past 24 hour(s))   MASSIVE TRANSFUSION    Collection Time: 07/17/17 12:00 AM   Result Value Ref Range    Component R       R                   Red Blood Cells     [id number]   issued       07/17/17   13:43      Product Type Red Blood Cells LR     Dispense Status Issued     Unit Number (Barcoded) [id number]     Product Code (Barcoded) O9265H39     Blood Type (Barcoded) 5100     Component R       R3                  Red Blood Cells3    [id number]   issued       07/17/17   14:30      Product Type Red Blood Cells LR Pheresis     Dispense Status Issued     Unit Number (Barcoded) [id number]     Product Code (Barcoded) K4324M69     Blood Type (Barcoded) 5100     " Component R       R3                  Red Blood Cells3    [id number]   issued       07/17/17   14:30      Product Type Red Blood Cells LR Pheresis     Dispense Status Issued     Unit Number (Barcoded) [id number]     Product Code (Barcoded) E2485G61     Blood Type (Barcoded) 5100     Component R       R3                  Red Blood Cells3    [id number]   issued       07/17/17   14:30      Product Type Red Blood Cells LR Pheresis     Dispense Status Issued     Unit Number (Barcoded) [id number]     Product Code (Barcoded) G1110Z25     Blood Type (Barcoded) 5100     Component Ft       FPT                 Plasma, Thawed      [id number]   issued       07/17/17   14:30      Product Type Plasma  Thawed     Dispense Status Issued     Unit Number (Barcoded) [id number]     Product Code (Barcoded) V2473K34     Blood Type (Barcoded) 6200     Component Ft       FPT-A0              Plasma, Thawed      [id number]   issued       07/17/17   14:30      Product Type Plasma  Thawed     Dispense Status Issued     Unit Number (Barcoded) [id number]     Product Code (Barcoded) A9539OX3     Blood Type (Barcoded) 6200     Component Ft       TA                  Thawed Plasma       [id number]   issued       07/17/17   14:30      Product Type Thawed Apheresis Plasma     Dispense Status Issued     Unit Number (Barcoded) [id number]     Product Code (Barcoded) I4604I42     Blood Type (Barcoded) 6200     Component P       P2                  Plts,Pheresis       [id number]   issued       07/17/17   14:30      Product Type Platelets  Pheresis LR     Dispense Status Issued     Unit Number (Barcoded) [id number]     Product Code (Barcoded) N9465E76     Blood Type (Barcoded) 7300     Component Ft       FPT                 Plasma, Thawed      [id number]   issued       07/17/17   14:30      Product Type Plasma  Thawed     Dispense Status Issued     Unit Number (Barcoded) [id number]     Product Code (Barcoded)  Z5560X49     Blood Type (Barcoded) 6200    CBC WITH DIFFERENTIAL    Collection Time: 07/17/17  1:00 PM   Result Value Ref Range    WBC 13.5 (H) 4.8 - 10.8 K/uL    RBC 3.39 (L) 4.70 - 6.10 M/uL    Hemoglobin 9.4 (L) 14.0 - 18.0 g/dL    Hematocrit 28.9 (L) 42.0 - 52.0 %    MCV 85.3 81.4 - 97.8 fL    MCH 27.7 27.0 - 33.0 pg    MCHC 32.5 (L) 33.7 - 35.3 g/dL    RDW 54.0 (H) 35.9 - 50.0 fL    Platelet Count 117 (L) 164 - 446 K/uL    MPV 12.7 9.0 - 12.9 fL    Neutrophils-Polys 86.20 (H) 44.00 - 72.00 %    Lymphocytes 7.90 (L) 22.00 - 41.00 %    Monocytes 4.80 0.00 - 13.40 %    Eosinophils 0.10 0.00 - 6.90 %    Basophils 0.20 0.00 - 1.80 %    Immature Granulocytes 0.80 0.00 - 0.90 %    Nucleated RBC 0.00 /100 WBC    Neutrophils (Absolute) 11.61 (H) 1.82 - 7.42 K/uL    Lymphs (Absolute) 1.07 1.00 - 4.80 K/uL    Monos (Absolute) 0.64 0.00 - 0.85 K/uL    Eos (Absolute) 0.01 0.00 - 0.51 K/uL    Baso (Absolute) 0.03 0.00 - 0.12 K/uL    Immature Granulocytes (abs) 0.11 0.00 - 0.11 K/uL    NRBC (Absolute) 0.00 K/uL   COMP METABOLIC PANEL    Collection Time: 07/17/17  1:00 PM   Result Value Ref Range    Sodium 133 (L) 135 - 145 mmol/L    Potassium 4.4 3.6 - 5.5 mmol/L    Chloride 106 96 - 112 mmol/L    Co2 17 (L) 20 - 33 mmol/L    Anion Gap 10.0 0.0 - 11.9    Glucose 165 (H) 65 - 99 mg/dL    Bun 16 8 - 22 mg/dL    Creatinine 1.46 (H) 0.50 - 1.40 mg/dL    Calcium 8.6 8.5 - 10.5 mg/dL    AST(SGOT) 73 (H) 12 - 45 U/L    ALT(SGPT) 57 (H) 2 - 50 U/L    Alkaline Phosphatase 86 30 - 99 U/L    Total Bilirubin 5.1 (H) 0.1 - 1.5 mg/dL    Albumin 2.6 (L) 3.2 - 4.9 g/dL    Total Protein 5.4 (L) 6.0 - 8.2 g/dL    Globulin 2.8 1.9 - 3.5 g/dL    A-G Ratio 0.9 g/dL   LIPASE    Collection Time: 07/17/17  1:00 PM   Result Value Ref Range    Lipase 28 11 - 82 U/L   PROTHROMBIN TIME    Collection Time: 07/17/17  1:00 PM   Result Value Ref Range    PT 29.1 (H) 12.0 - 14.6 sec    INR 2.65 (H) 0.87 - 1.13   APTT    Collection Time: 07/17/17  1:00 PM    Result Value Ref Range    APTT 35.8 24.7 - 36.0 sec   ESTIMATED GFR    Collection Time: 17  1:00 PM   Result Value Ref Range    GFR If African American >60 >60 mL/min/1.73 m 2    GFR If Non African American 50 (A) >60 mL/min/1.73 m 2   TSH (Thyroid Stimulating Hormone)    Collection Time: 17  1:00 PM   Result Value Ref Range    TSH 3.710 (H) 0.300 - 3.700 uIU/mL   COD (ADULT)    Collection Time: 17  1:05 PM   Result Value Ref Range    ABO Grouping Only A     Rh Grouping Only POS     Antibody Screen-Cod NEG    EKG (NOW)    Collection Time: 17  1:10 PM   Result Value Ref Range    Report       [facility] Emergency Dept.    Test Date:  2017  Pt Name:    PATRICIA MUÑOZ               Department: ER  MRN:        [id number]                      Room:       [facility]  Gender:     M                            Technician: [id number]  :        1960                   Requested By:ER TRIAGE PROTOCOL  Order #:    [id number]                    Reading MD:    Measurements  Intervals                                Axis  Rate:       126                          P:          -22  AK:         140                          QRS:        54  QRSD:       76                           T:          -17  QT:         300  QTc:        435    Interpretive Statements  SINUS TACHYCARDIA  PROBABLE INFERIOR INFARCT, AGE INDETERMINATE  Compared to ECG 2017 14:37:19  Myocardial infarct finding now present  T-wave abnormality no longer present         Imaging  DX-CHEST-LIMITED (1 VIEW)   Final Result         1.  Ill-defined opacifications in each lung have increased to a mild degree compared to the prior radiograph. This could indicate worsening of pulmonary edema or inflammation.      2.  Left subclavian central line noted with tip at the level of the SVC.          Assessment/Recommendations  ## UGIB secondary to esophageal varicies  -Mr Muñoz has had vomiting with bright red blood, melena, and prior  EGD with varicile banding.  EDG in the ED identified the offending varice, which was banded.  -Continue octreotide and protonix.  -Start on clear liquid diet and advance to full liquid as tolerated.  -Recommend outpatient banding of other varices.    ## Cirrhosis secondary to alcohol abuse  -Pt has a long history of alcohol use with elevated transaminases, reduced albumin, elevated total bilirubin, an INR of 2.6, and anemia.  Child-Pug A and MELD score 29, or 19.6% estimated 3 month mortality.  -Continue ceftriaxone  -Recommend abdominal US with doppler

## 2017-07-17 NOTE — H&P
Hospital Medicine History and Physical    Date of Service  7/17/2017    Chief Complaint  Chief Complaint   Patient presents with   • Blood in Vomit       History of Presenting Illness  57 y.o. male who presented 7/17/2017 with hematemesis, variceal bleeding, hemorrhagic shock.  Known cirrhosis, ongoing alcohol at 6 pack daily.  Transfused 4 units PRBC, platelets and plasma, CL placed, aggressive fluid rescussitation underway, had EGD with banding, going to ICU.    Primary Care Physician  Pcp Pt States None    Consultants  GI Dr. Moss    Code Status  Full    Review of Systems  Review of Systems   Constitutional: Positive for malaise/fatigue. Negative for fever.   HENT: Negative.    Eyes: Negative.  Negative for blurred vision.   Respiratory: Negative.  Negative for cough and shortness of breath.    Cardiovascular: Negative.  Negative for chest pain and leg swelling.   Gastrointestinal: Positive for heartburn, nausea, vomiting and melena. Negative for abdominal pain.        Massive hematemesis   Genitourinary: Negative.  Negative for dysuria.   Musculoskeletal: Positive for myalgias.   Skin: Negative.  Negative for rash.   Neurological: Positive for weakness. Negative for dizziness and headaches.   Endo/Heme/Allergies: Negative.  Does not bruise/bleed easily.   Psychiatric/Behavioral: Negative.  Negative for depression.          Past Medical History  Past Medical History   Diagnosis Date   • Hepatitis C    • COPD (chronic obstructive pulmonary disease) (CMS-HCC) 2/1/2010   • Liver cirrhosis (CMS-HCC)    • Hx of ascites    • Psychiatric disorder      schizophrenic   • Depression    • Alcoholism (CMS-HCC)        Surgical History  No past surgical history on file.    Medications  No current facility-administered medications on file prior to encounter.     No current outpatient prescriptions on file prior to encounter.       Family History  Family History   Problem Relation Age of Onset   • Cancer Mother      leukemia  "  • GI Father        Social History  Social History   Substance Use Topics   • Smoking status: Current Every Day Smoker -- 1.00 packs/day for 35 years     Types: Cigarettes   • Smokeless tobacco: Never Used   • Alcohol Use: Yes      Comment: daily 3-4 \"earthquakes\"       Allergies  Allergies   Allergen Reactions   • Peanut (Diagnostic) Hives        Physical Exam  Laboratory   Hemodynamics  Temp (24hrs), Av.8 °C (98.3 °F), Min:36.7 °C (98.1 °F), Max:36.9 °C (98.4 °F)   Temperature: 36.9 °C (98.4 °F)  Pulse  Av.2  Min: 92  Max: 131 Heart Rate (Monitored): 93  Blood Pressure: 120/78 mmHg, NIBP: (!) 95/52 mmHg      Respiratory      Respiration: 19, Pulse Oximetry: 99 %             Physical Exam   Constitutional: He is oriented to person, place, and time. He appears well-developed and well-nourished. He appears distressed.   Covered in dried blood, smelling of digested blood, weak   HENT:   Head: Normocephalic and atraumatic.   Right Ear: External ear normal.   Left Ear: External ear normal.   Nose: Nose normal.   Mouth/Throat: Oropharynx is clear and moist. No oropharyngeal exudate.   Dry blood around mouth, bearded   Eyes: Conjunctivae and EOM are normal. Pupils are equal, round, and reactive to light. Right eye exhibits no discharge. Left eye exhibits no discharge. No scleral icterus.   Neck: Normal range of motion. Neck supple. No JVD present. No tracheal deviation present. No thyromegaly present.   Cardiovascular: Regular rhythm, normal heart sounds and intact distal pulses.  Exam reveals no gallop and no friction rub.    No murmur heard.  Tachy    Pulmonary/Chest: Effort normal and breath sounds normal. No stridor. No respiratory distress. He has no wheezes. He has no rales. He exhibits no tenderness.   Abdominal: Soft. Bowel sounds are normal. He exhibits distension. He exhibits no mass. There is no tenderness. There is no rebound and no guarding.   Musculoskeletal: Normal range of motion. He exhibits no " edema or tenderness.   Lymphadenopathy:     He has no cervical adenopathy.   Neurological: He is alert and oriented to person, place, and time. He has normal reflexes. No cranial nerve deficit. Coordination normal.   Skin: Skin is warm. No rash noted. He is diaphoretic. No erythema. No pallor.   Psychiatric: He has a normal mood and affect. His behavior is normal. Judgment and thought content normal.   Nursing note and vitals reviewed.      Recent Labs      07/17/17   1300   WBC  13.5*   RBC  3.39*   HEMOGLOBIN  9.4*   HEMATOCRIT  28.9*   MCV  85.3   MCH  27.7   MCHC  32.5*   RDW  54.0*   PLATELETCT  117*   MPV  12.7     Recent Labs      07/17/17   1300   SODIUM  133*   POTASSIUM  4.4   CHLORIDE  106   CO2  17*   GLUCOSE  165*   BUN  16   CREATININE  1.46*   CALCIUM  8.6     Recent Labs      07/17/17   1300   ALTSGPT  57*   ASTSGOT  73*   ALKPHOSPHAT  86   TBILIRUBIN  5.1*   LIPASE  28   GLUCOSE  165*     Recent Labs      07/17/17   1300   APTT  35.8   INR  2.65*             Lab Results   Component Value Date    TROPONINI 0.03 01/13/2015       Imaging  None   Assessment/Plan     I anticipate this patient will require at least two midnights for appropriate medical management, necessitating inpatient admission.    * Hypovolemic shock (CMS-HCC)  Assessment & Plan  Massive transfusion completed, ongoing aggressive fluid rescussitation, pressors if needed, CL placed, varices now banded    Acute gastrointestinal hemorrhage  Assessment & Plan  S/p massive transfusion, PRBC's, platelets and plasma, banded, follow hgb, IVF, octreotide and protonix gtt, GI assisting, ICU admit, empiric rocephin    ARF (acute renal failure) (CMS-HCC)  Assessment & Plan  Massive fluid rescussitation and blood replacement, follow    Acute blood loss anemia  Assessment & Plan  S/p transfusions, follow hgb, INR    Cirrhosis (CMS-HCC) (present on admission)  Assessment & Plan  Watch ammonia, ?lactulose?, close watch on volume status    Alcoholism  "(CMS-[facility])  Assessment & Plan  CIWA, vitamins      Critical 50\" excluding procedures, discussed with daughter via phone    VTE prophylaxis: SCDs.       "

## 2017-07-17 NOTE — ASSESSMENT & PLAN NOTE
- IV  Levophed, RBCs  - pt was having significant bleeding from upper GI tract with melena.   - restart IV fluids.

## 2017-07-17 NOTE — ED NOTES
Pt BIB EMS   Bright red emesis x 4 today hx of liver failure and esophageal varices  Pt appears jaundice  BP 95/57,

## 2017-07-17 NOTE — OR SURGEON
Operative Report    PreOp Diagnosis: GI bleed, anemia    PostOp Diagnosis: esophageal varices, grade IV, with platelet plug.  PP was banded directly X 3, then 4 additional bands were placed on remaining varices    Procedure(s):  GASTROSCOPY-ENDO - Wound Class: Clean Contaminated    Surgeon(s):  Jackson Moss M.D.    Anesthesiologist/Type of Anesthesia:  No anesthesia staff entered./ketamine/propofol/versed per Dr. Hensley from ER    Surgical Staff:  Endoscopy Technician: Ronald Lopez  Sedation/Monitoring Nurse: Catherine Lou R.N.    Specimens:  none    Estimated Blood Loss: minimal    Findings: as above.  Also: no gastric varices, large clot in stomach, fresh blood in duodenum.  No obvious bleeding lesions other than esophageal varices    Complications: none    Plan:  Clear liquid diet, advance to full liquids tomorrow if no signs of bleeding   Continue IV Octreotide  Finish off IV PPI protonix drip bag, and then transition to protonix 40 mg IV qD          7/17/2017 3:16 PM Jackson Moss

## 2017-07-17 NOTE — ED PROVIDER NOTES
"ED Provider Note    Scribed for Prosper Hensley M.D. by Edward Pool. 7/17/2017  1:19 PM    Means of arrival: EMS  History obtained from: Patient  History limited by: None    CHIEF COMPLAINT  Chief Complaint   Patient presents with   • Blood in Vomit       HPI  Dane Lincoln is a 57 y.o. Male with a history of liver cirrhosis and esophageal varices who presents to the Emergency Department complaining of hematemesis onset yesterday. He had three or four epidsodes of hematemesis since. The blood in his vomit is bright red. His last PO fluid or solid intake was yesterday. The patient reports assoicated chest pain. The patient reports a history of alcholism. He drinks six beers a day and he last drank three beers yesterday. Patient admits to smoking half a pack of cigarettes a day. Patient denies a history wheezing. He is not taking any coumadin or other blood thinners. He states that he has had several endoscopies in the past. Patient's last endoscopy was six months ago.    REVIEW OF SYSTEMS  Pertinent negatives include no past wheezing. As above, all other systems reviewed and are negative.   See HPI for further details.   C    PAST MEDICAL HISTORY   has a past medical history of Hepatitis C; COPD (chronic obstructive pulmonary disease) (CMS-HCC) (2/1/2010); Liver cirrhosis (CMS-HCC); ascites; Psychiatric disorder; Depression; and Alcoholism (CMS-HCC).    SURGICAL HISTORY  patient denies any surgical history    SOCIAL HISTORY  Social History   Substance Use Topics   • Smoking status: Current Every Day Smoker -- 1.00 packs/day for 35 years     Types: Cigarettes   • Smokeless tobacco: Never Used   • Alcohol Use: Yes      Comment: daily 3-4 \"earthquakes\"      History   Drug Use No     Comment: hx of iv drugs, quit 1999       FAMILY HISTORY  Family History   Problem Relation Age of Onset   • Cancer Mother      leukemia   • GI Father        CURRENT MEDICATIONS  No current facility-administered medications on file " "prior to encounter.     No current outpatient prescriptions on file prior to encounter.       ALLERGIES  Allergies   Allergen Reactions   • Peanut (Diagnostic) Hives       PHYSICAL EXAM  VITAL SIGNS: BP 94/69 mmHg  Pulse 129  Temp(Src) 36.7 °C (98.1 °F)  Resp 18  Ht 1.727 m (5' 8\")  Wt 90.719 kg (200 lb)  BMI 30.42 kg/m2  SpO2 97%  Constitutional: Well developed, Well nourished, No acute distress, Non-toxic appearance.   HENT: Normocephalic, Atraumatic, Bilateral external ears normal, Oropharynx is clear mucous membranes are moist. No oral exudates or nasal discharge.   Eyes: Pupils are equal round and reactive, EOMI, Conjunctiva normal, No discharge.   Neck: Normal range of motion, No tenderness, Supple, No stridor. No meningismus.  Lymphatic: No lymphadenopathy noted.   Cardiovascular: Tachycardia and regular rhythm without murmur rub or gallop.  Thorax & Lungs: Clear breath sounds bilaterally without rhonchi or rales. Wheezing. There is no chest wall tenderness.   Abdomen: Soft. Epigastric tenderness with mild distention. There is no rebound or guarding. No hepatomegaly is appreciated. Bowel sounds are normal.  Skin: Normal without rash.   Back: No CVA or spinal tenderness.   Extremities: Intact distal pulses, No edema, No tenderness, No cyanosis, No clubbing. Capillary refill is at 3 seconds.  Musculoskeletal: Good range of motion in all major joints. No tenderness to palpation or major deformities noted.   Neurologic: Alert & oriented x 3, Normal motor function, Normal sensory function, No focal deficits noted. Reflexes are normal.  Psychiatric: Affect normal, Judgment normal, Mood normal. There is no suicidal ideation or patient reported hallucinations.       DIAGNOSTIC STUDIES / PROCEDURES    Conscious Sedation Procedure    Indication: Endoscopy    Consent: I have discussed with the patient and/or the patient representative the indication, alternatives, and the possible risks and/or complications of " the planned procedure and the anesthesia methods. The patient and/or patient representative appear to understand and agree to proceed.    Physician Involvement: The attending physician was present and supervising this procedure.    Pre-Sedation Documentation and Exam: I have personally completed a history, physical exam & review of systems for this patient (see notes).  Airway Assessment: dentition not prohibitive    Prior History of Anesthesia Complications: none    ASA Classification: Class 3 - A patient with severe systemic disease that limits activity but is not incapacitating    Sedation/ Anesthesia Plan: intravenous sedation    Medications Used: Ketamine 40 mg and 100 mg Propofol intravenously    Monitoring and Safety: The patient was placed on a cardiac monitor and vital signs, pulse oximetry and level of consciousness were continuously evaluated throughout the procedure. The patient was closely monitored until recovery from the medications was complete and the patient had returned to baseline status. Respiratory therapy was on standby at all times during the procedure.    Post-Sedation Vital Signs: Vital signs were reviewed and were stable after the procedure (see flow sheet for vitals)            Post-Sedation Exam: Lungs: clear           Complications: none    LABS  Labs Reviewed   CBC WITH DIFFERENTIAL - Abnormal; Notable for the following:     WBC 13.5 (*)     RBC 3.39 (*)     Hemoglobin 9.4 (*)     Hematocrit 28.9 (*)     MCHC 32.5 (*)     RDW 54.0 (*)     Platelet Count 117 (*)     Neutrophils-Polys 86.20 (*)     Lymphocytes 7.90 (*)     Neutrophils (Absolute) 11.61 (*)     All other components within normal limits    Narrative:     Indicate which anticoagulants the patient is on:->NONE   COMP METABOLIC PANEL - Abnormal; Notable for the following:     Sodium 133 (*)     Co2 17 (*)     Glucose 165 (*)     Creatinine 1.46 (*)     AST(SGOT) 73 (*)     ALT(SGPT) 57 (*)     Total Bilirubin 5.1 (*)      Albumin 2.6 (*)     Total Protein 5.4 (*)     All other components within normal limits    Narrative:     Indicate which anticoagulants the patient is on:->NONE   PROTHROMBIN TIME - Abnormal; Notable for the following:     PT 29.1 (*)     INR 2.65 (*)     All other components within normal limits    Narrative:     Indicate which anticoagulants the patient is on:->NONE   ESTIMATED GFR - Abnormal; Notable for the following:     GFR If Non  50 (*)     All other components within normal limits    Narrative:     Indicate which anticoagulants the patient is on:->NONE   TSH - Abnormal; Notable for the following:     TSH 3.710 (*)     All other components within normal limits   COD (ADULT)   LIPASE    Narrative:     Indicate which anticoagulants the patient is on:->NONE   APTT    Narrative:     Indicate which anticoagulants the patient is on:->NONE   MASSIVE TRANSFUSION   FRESH FROZEN PLASMA   BLOOD CULTURE   BLOOD CULTURE   URINALYSIS   LACTIC ACID   LACTIC ACID   PHOSPHORUS   [facility] POC GLUCOSE   [facility] POC GLUCOSE   [facility] POC GLUCOSE   [facility] POC GLUCOSE   TRANSFUSE RED BLOOD CELLS-NURSING COMMUNICATION   TRANSFUSE FRESH FROZEN PLASMA-NURSING COMMUNICATION      All labs reviewed by me.    EKG Interpretation:  EKG Interpretation    Interpreted by emergency department physician    Rhythm: sinus tachycardia  Rate: tachycardia  Axis: normal  Ectopy: none  Conduction: normal  ST Segments: no acute change  T Waves: no acute change  Q Waves: nonspecific    Clinical Impression: sinus tachycardia        RADIOLOGY  DX-CHEST-LIMITED (1 VIEW)   Final Result         1.  Ill-defined opacifications in each lung have increased to a mild degree compared to the prior radiograph. This could indicate worsening of pulmonary edema or inflammation.      2.  Left subclavian central line noted with tip at the level of the SVC.        The radiologist's interpretation of all radiological studies have been reviewed by  me.    COURSE & MEDICAL DECISION MAKING  Nursing notes, VS, PMSFHx reviewed in chart.    1:19 PM Patient seen and examined at bedside. Ordered for DX chest, EKG, and labs to evaluate. Patient was treated with NS infusion 2L for his symptoms. I asked for red box immediately as the patient was tachycardic and hypotensive and in extreme illness with what I believe is variceal bleeding    1:23 PM Paged Gastroenterology. I could not find a GI physician associated with his care.    1:26 PM I discussed the patient's case and the above findings with Dr. Sanz (GI) who agree to evaluate the patient. He would like me to admit to ICU and have an endoscopy in the ICU.    1:31 PM Paged Hospitalist.  Laboratory evaluation reveals leukocytosis at 13,500 with 86% PMN shift and hemoglobin which is down from his baseline in the 12/13 range down to 9.4. Patient has a serum electrolyte panel that shows mild renal insufficiency with a creatinine of 1.46 but this may be commensurate with his body weight. He has a bicarb of 17 but no elevation of anion gap. Liver enzymes are mildly elevated. No evidence of pancreatitis    1:33 PM I ordered Octreotide injection 50 mcg and 1250 mcg in  ml infusion as well as Protonix 80 mg in  ml and 80 mg in  ml drips.    1:34 PM I discussed the patient's case and the above findings with Blood Bank for Red Box. The patient was level I transfuser total of 4 units of packed red cells. I consented him for blood transfusion as follows:  I (Prosper Hensley M.D.) certify that I have explained to the patient or the patient’s legal representative, the following:    (i)     Explained the Blood Transfusion procedure to be undertaken;  (ii)    Explained alternative treatments and their general nature and risks; and  (iii)   Explained the risks, and extent of risk, to the Blood Transfusion procedure.  Risks included, but are not limited to the following:  mild allergic reactions, hemolytic reaction, and  possible exposure to infectious agents such as hepatitis, cytomegalovirus, infectious mononucleosis, and acquired immune deficiency syndrome (AIDS)    I have explained all of the above and have answered all patient questions arising regarding the procedure to be taken, and I believe that the patient understands what I have explained.    Date 7/17/2017  Time of Consent 1515  This form is electronically signed by Prosper Hensley        1:36 PM I updated the patient on his treatment plan including blood transfusion, endoscopy, and admittance to the ICU.    1:37 PM I discussed the patient's case and the above findings with Dr. Allison (Hospitalist) who agrees to transfer care of the patient at this time. Patient informed me that he sees a physician at [facility]. Dr. Allison immediately placed a central line after I spoke with him. Chest x-ray after the procedure shows a central line and left subclavian    1:39 PM Paged [facility]. I activated level one labs.    1:47 PM I discussed the patient's case and the above findings with Dr. Moss ([facility]) who agrees to evaluate the patient.    2:26 PM I reevaluated the patient and his blood pressure is getting better.    2:51 PM I ordered Ketalar 50 mg/ml and Diprivan injection for patient's conscious sedation.    2:54 PM I performed a conscious sedation procedure, as outlined above. Dr. Moss, [facility], will perform the endoscopy at bedside. I had nursing informed Dr. Moss that I am administering conscious sedation at this time.    3:01 PM Dr. Moss, [facility], at beside and performing endoscopy.    3:14 PM Patient's blood pressure is stable and responded well to conscious sedation.    CRITICAL CARE  The very real possibility of a deterioration of this patient's condition required the highest level of my preparedness for sudden, emergent intervention.  I provided critical care services, which included medication orders, frequent reevaluations of the  patient's condition and response to treatment, ordering and reviewing test results, and discussing the case with various consultants.  The critical care time associated with the care of the patient was 45 minutes. Review chart for interventions. This time is exclusive of any other billable procedures.     DISPOSITION:  Patient will be admitted to Dr. Allison, Hospitalist, in critical condition.    FINAL IMPRESSION  1. UGIB (upper gastrointestinal bleed)     blood transfusion by ERP, 4 units  Conscious sedation procedure performed by the ERP, as outlined above.  Total critical care time of 45 minutes, as outlined above.     Edward KUMAR (Scribe), am scribing for, and in the presence of, Prosper Hensley M.D..    Electronically signed by: Edward Pool (Scribe), 7/17/2017    Prosper KUMAR M.D. personally performed the services described in this documentation, as scribed by Edward Pool in my presence, and it is both accurate and complete.    The note accurately reflects work and decisions made by me.  Prosper Hensley  7/17/2017  3:50 PM

## 2017-07-17 NOTE — ASSESSMENT & PLAN NOTE
- initially resolved with EGD and banding 7/17 and 7/26. Octreotide drip  restarted as well as an IV nexium drip.  Dr. Moss attempting further esophageal banding.  He may require a TIPS procedure.   IV usasyn due to probable aspiration.

## 2017-07-17 NOTE — ED NOTES
Pt cleaned of bloody dried fecal matter, clean sheets and gown provided. Pt in position of optimal comfort and BP.

## 2017-07-17 NOTE — ED NOTES
Med rec complete per Pt and [facility]@ [phone]  Pt denies any medication   Allergies reviewed  No ABX in last 30 days per pharmacy

## 2017-07-17 NOTE — ED NOTES
Pt's daughter Leann called for an update, pt verbalized we can give her information.  Dr. Allison spoke with pt's daughter.

## 2017-07-18 PROBLEM — E83.42 LOW SERUM MAGNESIUM LEVEL: Status: ACTIVE | Noted: 2017-01-01

## 2017-07-18 NOTE — PROGRESS NOTES
[facility] Hospitalist Progress Note    Date of Service: 2017    Chief Complaint  57 y.o. male admitted 2017 hematemesis, variceal bleeding, hemorrhagic shock.  Known cirrhosis, ongoing alcohol at 6 pack daily.  Transfused 4 units PRBC, platelets and plasma, aggressive fluid rescussitation, had EGD with 7 bands.    Interval Problem Update   A+Px4.  Room air.  Clear liquid diet.  Had marroon stool. Tmax:98.9.  States some sore throat.  States he is done drinking alcohol.  Care discussed in am ICU MDR. Okay to transfer out of ICU.    Consultants/Specialty  Pulmonary  GI    Disposition  Transfer out of ICU.        Review of Systems   Constitutional: Negative for fever.   HENT: Positive for sore throat. Negative for congestion.    Eyes: Negative for discharge and redness.   Respiratory: Negative for cough and shortness of breath.    Cardiovascular: Negative for chest pain and leg swelling.   Gastrointestinal: Positive for melena. Negative for nausea, abdominal pain and blood in stool.   Genitourinary: Negative for dysuria and frequency.   Musculoskeletal: Negative for myalgias and joint pain.   Skin: Negative for itching.   Neurological: Negative for tremors, speech change, weakness and headaches.   Psychiatric/Behavioral: Positive for substance abuse. The patient is nervous/anxious.       Physical Exam  Laboratory/Imaging   Hemodynamics  Temp (24hrs), Av.2 °C (98.9 °F), Min:36.8 °C (98.3 °F), Max:37.7 °C (99.9 °F)   Temperature: 37.5 °C (99.5 °F)  Pulse  Av.9  Min: 84  Max: 131 Heart Rate (Monitored): 92  NIBP: 104/53 mmHg CVP (mm Hg): 6 MM HG    Respiratory      Respiration: (!) 25, Pulse Oximetry: 95 %             Fluids    Intake/Output Summary (Last 24 hours) at 17  Last data filed at 17 1800   Gross per 24 hour   Intake 3252.5 ml   Output   1200 ml   Net 2052.5 ml       Nutrition  Orders Placed This Encounter   Procedures   • DIET ORDER     Standing Status: Standing      Number of  Occurrences: 1      Standing Expiration Date:      Order Specific Question:  Diet:     Answer:  Full Liquid [11]     Physical Exam   Constitutional: He is oriented to person, place, and time. He appears well-developed. No distress.   overweight   HENT:   Head: Normocephalic and atraumatic.   Nose: Nose normal.   Eyes: Conjunctivae and EOM are normal. Right eye exhibits no discharge. Left eye exhibits no discharge. No scleral icterus.   Neck: No tracheal deviation present.   Cardiovascular: Normal rate, regular rhythm, normal heart sounds and intact distal pulses.    No murmur heard.  Pulmonary/Chest: Effort normal and breath sounds normal. No stridor. No respiratory distress. He has no wheezes.   Abdominal: Soft. Bowel sounds are normal. He exhibits no distension. There is no tenderness.   Musculoskeletal: He exhibits no edema.   Neurological: He is alert and oriented to person, place, and time. No cranial nerve deficit.   Skin: Skin is warm. He is not diaphoretic.   Psychiatric: He has a normal mood and affect. His behavior is normal. Thought content normal.   Vitals reviewed.      Recent Labs      07/17/17   1300  07/18/17   0414  07/18/17   1630   WBC  13.5*  5.7  3.7*   RBC  3.39*  2.97*  2.73*   HEMOGLOBIN  9.4*  8.4*  7.8*   HEMATOCRIT  28.9*  25.0*  24.7*   MCV  85.3  84.2  89.4   MCH  27.7  28.3  28.6   MCHC  32.5*  33.6*  32.0*   RDW  54.0*  49.2  54.1*   PLATELETCT  117*  40*  36*   MPV  12.7  11.3  11.8     Recent Labs      07/17/17   1300  07/18/17   0414   SODIUM  133*  138   POTASSIUM  4.4  4.0   CHLORIDE  106  114*   CO2  17*  19*   GLUCOSE  165*  64*   BUN  16  19   CREATININE  1.46*  1.05   CALCIUM  8.6  7.5*     Recent Labs      07/17/17   1300   APTT  35.8   INR  2.65*                  Assessment/Plan     * Hypovolemic shock (CMS-HCC)  Assessment & Plan  Massive transfusion completed  Monitor Vitals, I/O      Acute gastrointestinal hemorrhage  Assessment & Plan  Status post massive  transfusion.  He had 7 variceals banded on EGD.  Octreotide drip and Protonix drip stopped July 18 by GI  Transfer out of ICU. Monitor CBCs vital signs  Empiric Rocephin  GI assisting/consultation    ARF (acute renal failure) (CMS-HCC)  Assessment & Plan  Monitor BMP   Improved creatinine July 18th    Acute blood loss anemia  Assessment & Plan  S/p transfusions, follow hgb, INR  Transfuse for hemoglobin less than 7 or hematocrit less than 21    Cirrhosis (CMS-HCC) (present on admission)  Assessment & Plan  Check ammonia level. Monitor anasarca/swelling    Alcoholism (CMS-HCC)  Assessment & Plan  CIWA, vitamins    Low serum magnesium level  Assessment & Plan  Replace magnesium with IV magnesium sulfate.      Radiology images reviewed, Labs reviewed and Medications reviewed  Allan catheter: No Allan

## 2017-07-18 NOTE — CARE PLAN
Problem: Safety  Goal: Will remain free from injury  Outcome: PROGRESSING AS EXPECTED  SAFETY MEASURES IN PLACE. ENCOURAGED TO MAKE NEEDS KNOWN.

## 2017-07-18 NOTE — PROGRESS NOTES
Pt brought to ICU via gurbelgica. A&Ox4. Informed pt of poc, safety, call system, and unit routine. All questions/concerns addressed. All needs met. No distress. Fall precautions in place.

## 2017-07-18 NOTE — PROGRESS NOTES
Assumed care. No complaints. Discussed poc, safety, call system, medications and unit routine. All questions/concerns addressed. All needs met. No distress.

## 2017-07-18 NOTE — CARE PLAN
Problem: Communication  Goal: The ability to communicate needs accurately and effectively will improve  Intervention: Educate patient and significant other/support system about the plan of care, procedures, treatments, medications and allow for questions  Updated pt on poc regarding medication changes and possible transfer out of ICU. Verbalized understanding.      Problem: Skin Integrity  Goal: Risk for impaired skin integrity will decrease  Pt able to turn self. Repositioning medical devices.

## 2017-07-18 NOTE — RESPIRATORY CARE
COPD EDUCATION by COPD CLINICAL EDUCATOR  7/18/2017 at 8:39 AM by Emilia Killian     Patient reviewed by COPD education team. Patient does not qualify for COPD program.

## 2017-07-18 NOTE — DISCHARGE PLANNING
Received Consult requesting Community Resources for Substance [place]. Met with pt who was agreeable to resources. Presented pt with drug and alcohol resources. Pt's transfer status is now, [place]. Pt's most likely discharge disposition is, Home.

## 2017-07-18 NOTE — PROGRESS NOTES
Gastroenterology Progress Note     Author: Jackson Moss   Date & Time Created: 2017 10:53 AM    Interval History:  EGD yesterday with band ligation of esophageal varices, with one associated with platelet plug.  Stable hemodynamically  Hgb 8.4 today, BUN not elevated  Review of Systems:  Review of Systems   Constitutional: Negative.    Gastrointestinal:        Small black stool overnight       Physical Exam:  Physical Exam   Cardiovascular: Normal rate.    Pulmonary/Chest: Effort normal.   Abdominal: Soft.       Labs:        Invalid input(s): [id number]      Recent Labs      17   1300  17   1619  17   0414   SODIUM  133*   --   138   POTASSIUM  4.4   --   4.0   CHLORIDE  106   --   114*   CO2  17*   --   19*   BUN  16   --   19   CREATININE  1.46*   --   1.05   MAGNESIUM   --    --   1.4*   PHOSPHORUS   --   3.0   --    CALCIUM  8.6   --   7.5*     Recent Labs      17   1300  17   0414   ALTSGPT  57*  69*   ASTSGOT  73*  95*   ALKPHOSPHAT  86  57   TBILIRUBIN  5.1*  3.5*   LIPASE  28   --    GLUCOSE  165*  64*     Recent Labs      17   1300  17   0414   RBC  3.39*  2.97*   HEMOGLOBIN  9.4*  8.4*   HEMATOCRIT  28.9*  25.0*   PLATELETCT  117*  40*   PROTHROMBTM  29.1*   --    APTT  35.8   --    INR  2.65*   --      Recent Labs      17   1300  17   0414   WBC  13.5*  5.7   NEUTSPOLYS  86.20*   --    LYMPHOCYTES  7.90*   --    MONOCYTES  4.80   --    EOSINOPHILS  0.10   --    BASOPHILS  0.20   --    ASTSGOT  73*  95*   ALTSGPT  57*  69*   ALKPHOSPHAT  86  57   TBILIRUBIN  5.1*  3.5*     Hemodynamics:  Temp (24hrs), Av °C (98.6 °F), Min:36.7 °C (98.1 °F), Max:37.7 °C (99.9 °F)  Temperature: 37.7 °C (99.9 °F)  Pulse  Av.1  Min: 84  Max: 131Heart Rate (Monitored): 98  Blood Pressure: 120/78 mmHg, NIBP: 106/55 mmHg  CVP (mm Hg): 4 MM HG  Respiratory:    Respiration: (!) 22, Pulse Oximetry: 93 %, O2 Daily Delivery Respiratory : Silicone Nasal  Cannula           Fluids:    Intake/Output Summary (Last 24 hours) at 07/18/17 1053  Last data filed at 07/18/17 1000   Gross per 24 hour   Intake 5459.67 ml   Output    700 ml   Net 4759.67 ml     Weight: 97.3 kg (214 lb 8.1 oz)  GI/Nutrition:  Orders Placed This Encounter   Procedures   • DIET ORDER     Standing Status: Standing      Number of Occurrences: 1      Standing Expiration Date:      Order Specific Question:  Diet:     Answer:  Full Liquid [11]     Medical Decision Making, by Problem:  Active Hospital Problems    Diagnosis   • *Hypovolemic shock (CMS-HCC) [R57.1]   • Acute gastrointestinal hemorrhage [K92.2]   • ARF (acute renal failure) (CMS-HCC) [N17.9]   • Acute blood loss anemia [D62]   • Alcoholism (CMS-HCC) [F10.20]   • Cirrhosis (CMS-HCC) [K74.60]       Plan:  Doing well post-banding.  OK for transfer out of ICU  Advance to full liquids  Will d/c Octreotide and IV Protonix  Start Omeprazole 20 mg daily    Core Measures

## 2017-07-19 PROBLEM — E87.6 HYPOKALEMIA: Status: ACTIVE | Noted: 2017-01-01

## 2017-07-19 PROBLEM — R10.10 PAIN OF UPPER ABDOMEN: Status: ACTIVE | Noted: 2017-01-01

## 2017-07-19 PROBLEM — R79.89 INCREASED AMMONIA LEVEL: Status: ACTIVE | Noted: 2017-01-01

## 2017-07-19 PROBLEM — R50.9 FEVER: Status: ACTIVE | Noted: 2017-01-01

## 2017-07-19 NOTE — CARE PLAN
Problem: Safety  Goal: Will remain free from injury  Intervention: Provide assistance with mobility  Bed alarm in use, call light within reach. Patient family oriented to surroundings. Aspiration precautions in place. BVM at bedside. VS on monitor      Problem: Skin Integrity  Goal: Risk for impaired skin integrity will decrease  Intervention: Assess risk factors for impaired skin integrity and/or pressure ulcers  Turn q2h. Provided adequate nutrition. Skin clean and dry.

## 2017-07-19 NOTE — PROGRESS NOTES
[facility] Progress Note    Date of Service: 2017    Chief Complaint  57 y.o. male admitted 2017 hematemesis, variceal bleeding, hemorrhagic shock.  Known cirrhosis, ongoing alcohol at 6 pack daily.  Transfused 4 units PRBC, platelets and plasma, aggressive fluid rescussitation, had EGD with 7 bands.    Interval Problem Update  Ativan prn.  On room air.  Nausea overnight.  Full liquids and tolerated diet.  Patient states some ongoing black stools. Patient with increasing abdominal pain this afternoon abdominal film reviewed with some air in the intestines. Labs ordered and reviewed with ongoing elevated liver enzymes but has normal lactic acid. Patient still awaits transfer out of ICU    Consultants/Specialty  Pulmonary  GI    Disposition  Transfer out of ICU.        Review of Systems   Constitutional: Positive for fever, chills and malaise/fatigue.   HENT: Positive for sore throat. Negative for congestion.    Eyes: Negative for blurred vision.   Respiratory: Negative for cough and shortness of breath.    Cardiovascular: Negative for chest pain and leg swelling.   Gastrointestinal: Positive for nausea, abdominal pain and melena. Negative for heartburn and vomiting.   Genitourinary: Negative for dysuria and hematuria.   Musculoskeletal: Negative for back pain and joint pain.   Skin: Negative for rash.   Neurological: Negative for dizziness, speech change, weakness and headaches.   Psychiatric/Behavioral: Negative for depression. The patient is nervous/anxious.       Physical Exam  Laboratory/Imaging   Hemodynamics  Temp (24hrs), Av.7 °C (99.9 °F), Min:37.1 °C (98.8 °F), Max:38.2 °C (100.8 °F)   Temperature: 37.8 °C (100 °F)  Pulse  Av.4  Min: 84  Max: 131 Heart Rate (Monitored): 92  NIBP: 122/72 mmHg      Respiratory      Respiration: 20, Pulse Oximetry: 96 %     Work Of Breathing / Effort: Moderate       Fluids    Intake/Output Summary (Last 24 hours) at 17 9796  Last data filed at  07/19/17 1600   Gross per 24 hour   Intake   2410 ml   Output    150 ml   Net   2260 ml       Nutrition  Orders Placed This Encounter   Procedures   • DIET ORDER     Standing Status: Standing      Number of Occurrences: 1      Standing Expiration Date:      Order Specific Question:  Diet:     Answer:  Regular [1]     Order Specific Question:  Texture/Fiber modifications:     Answer:  Dysphagia 3(Mechanical Soft)specify fluid consistency(question 6) [3]     Order Specific Question:  Consistency/Fluid modifications:     Answer:  Thin Liquids [3]     Physical Exam   Constitutional: He is oriented to person, place, and time. He appears well-developed. No distress.   overweight. Appears uncomfortable   HENT:   Head: Normocephalic and atraumatic.   Nose: Nose normal.   Mouth/Throat: Oropharynx is clear and moist.   Eyes: Conjunctivae and EOM are normal. Right eye exhibits no discharge. Left eye exhibits no discharge. No scleral icterus.   Neck: No tracheal deviation present.   Cardiovascular: Normal rate, regular rhythm, normal heart sounds and intact distal pulses.    No murmur heard.  Pulmonary/Chest: Effort normal and breath sounds normal. No stridor. No respiratory distress. He has no wheezes.   Abdominal: Soft. Bowel sounds are normal. He exhibits no distension. There is no tenderness. There is no guarding.   No tympany on percussion   Musculoskeletal: He exhibits no edema.   Lymphadenopathy:     He has no cervical adenopathy.   Neurological: He is alert and oriented to person, place, and time. No cranial nerve deficit.   Skin: Skin is warm. He is not diaphoretic.   Psychiatric: He has a normal mood and affect. His behavior is normal. Thought content normal.   Vitals reviewed.      Recent Labs      07/18/17   0414  07/18/17   1630  07/19/17   0426   WBC  5.7  3.7*  4.5*   RBC  2.97*  2.73*  2.88*   HEMOGLOBIN  8.4*  7.8*  8.2*   HEMATOCRIT  25.0*  24.7*  24.9*   MCV  84.2  89.4  86.5   MCH  28.3  28.6  28.5   MCHC   33.6*  32.0*  32.9*   RDW  49.2  54.1*  52.2*   PLATELETCT  40*  36*  52*   MPV  11.3  11.8  12.5     Recent Labs      07/17/17   1300  07/18/17   0414  07/19/17   1515   SODIUM  133*  138  135   POTASSIUM  4.4  4.0  3.5*   CHLORIDE  106  114*  109   CO2  17*  19*  20   GLUCOSE  165*  64*  109*   BUN  16 19  16   CREATININE  1.46*  1.05  0.76   CALCIUM  8.6  7.5*  7.9*     Recent Labs      07/17/17   1300   APTT  35.8   INR  2.65*                  Assessment/Plan     * Hypovolemic shock (CMS-HCC)  Assessment & Plan  Massive transfusion completed  Monitor Vitals, I/O  Tolerating diet    Acute gastrointestinal hemorrhage  Assessment & Plan  Status post massive transfusion.  He had 7 variceals banded on EGD.  Octreotide drip and Protonix drip stopped July 18 by GI  Transfer out of ICU. Monitor CBCs vital signs  Empiric Rocephin  GI assisting/consultation  Monitor hemoglobin and face of ongoing melena which is likely residual blood passing.    ARF (acute renal failure) (CMS-HCC)  Assessment & Plan  Monitor BMP   Improved creatinine July 18th    Acute blood loss anemia  Assessment & Plan  S/p transfusions, follow hgb, INR  Transfuse for hemoglobin less than 7 or hematocrit less than 21    Cirrhosis (CMS-HCC) (present on admission)  Assessment & Plan  Check ammonia level. Monitor anasarca/swelling    Alcoholism (CMS-HCC)  Assessment & Plan  CIWA, vitamins    Low serum magnesium level  Assessment & Plan  Replaced July 18.    Pain of upper abdomen  Assessment & Plan  Normal lactic acid July 19. Await CBC areas check lipase.  Ultrasound abdomen right upper quadrant given elevated LFTs  Could be ongoing gastritis continue with proton pump inhibitor  Pain control  Abdominal x-ray reviewed    Fever  Assessment & Plan  Antibiotic  Monitoring CBC. July 19 normal lactic acid recently normal urine analysis monitor vitals      Radiology images reviewed, Labs reviewed and Medications reviewed  Allan catheter: No  Allan

## 2017-07-19 NOTE — PROGRESS NOTES
MD Martinez aware of patient increased respiratory rate and bladder scan results. Orders followed.

## 2017-07-19 NOTE — CARE PLAN
Problem: Communication  Goal: The ability to communicate needs accurately and effectively will improve  Outcome: PROGRESSING AS EXPECTED  IMPROVING ABILITY TO COMMUNICATE EFFECTIVELY AS WELL AS UTILIZE CALL LIGHT TO MAKE NEEDS KNOWN APPROPRIATELY    Problem: Safety  Goal: Will remain free from injury  Outcome: PROGRESSING AS EXPECTED  SAFETY MEASURES IN PLACE. ENCOURAGED TO MAKE NEEDS KNOWN. WILL MONITOR AND ASSESS FREQUENTLY.

## 2017-07-19 NOTE — PROGRESS NOTES
"Gastroenterology Progress Note:    Edmond Terry  Date & Time note created:    7/19/2017   2:56 PM     Visit Time:  10:30am    Patient ID:   Name:             Dane Lincoln   YOB: 1960  Age:                 57 y.o.  male   MRN:               [id number]    Attending: Dr. Moss  Resident: Dr. Edmond Terry                                                       ###############################################################  ID:  56 yo M w/ hx of alcohol use and cirrhosis presented with hematemesis to the ED two days prior.    Interval History:  At bedside pt is doing better after having banding of varices in the ED.  He still notes fatigue.  He had recent bowel movements that were dark in color.  He is tolerating his diet, though isn't able to finish the amounts given to slight abdominal discomfort.    Review of Systems:      Constitutional: Denies fevers/chills  Cardiovascular: Denies chest pain or palpitations   Respiratory: Denies shortness of breath or cough  Gastrointestinal/Hepatic: (+) RLQ pain.  Denies nausea, vomiting, diarrhea, constipation  Genitourinary: Denies dysuria or frequency  Musculoskeletal/Rheum: Denies  joint or muscle pain  Neurological: Denies focal weakness/parasthesias  All other systems were reviewed and are negative (AMA/CMS criteria)              #################################################################  Physical Exam:  Vitals/ General Appearance:   Weight/BMI: Body mass index is 32.62 kg/(m^2).  Blood pressure 120/78, pulse 120, temperature 38.1 °C (100.6 °F), resp. rate 20, height 1.727 m (5' 8\"), weight 97.3 kg (214 lb 8.1 oz), SpO2 92 %.  Filed Vitals:    07/19/17 1000 07/19/17 1100 07/19/17 1200 07/19/17 1400   BP:       Pulse: 102 102 102 120   Temp: 37.9 °C (100.3 °F)  38.2 °C (100.8 °F) 38.1 °C (100.6 °F)   Resp: 20      Height:       Weight:       SpO2: 96% 94% 96% 92%     Oxygen Therapy:  Pulse Oximetry: 92 %, O2 (LPM): 2, O2 Delivery: Silicone Nasal " Cannula    Constitutional: No acute distress, Non-toxic appearance.   HENMT:  NC/AT. MMM. No oral exudates.  Eyes:  PERRL, EOMI, Conjunctiva normal.   Cardiovascular:  Normal heart rate, Normal rhythm, No murmurs, No rubs, No gallops.    Extremities: no cyanosis or edema.  Lungs:  CTABL, no w/r/r. No crackles.   Abdomen: RLQ tenderness.  Soft, ND. +BS.  Skin: Warm, Dry, No erythema, No rash.  Neurologic: AAOx3, no focal deficits.     #################################################################  Lab Data Review:  Recent Results (from the past 24 hour(s))   CBC WITH DIFFERENTIAL    Collection Time: 07/18/17  4:30 PM   Result Value Ref Range    WBC 3.7 (L) 4.8 - 10.8 K/uL    RBC 2.73 (L) 4.70 - 6.10 M/uL    Hemoglobin 7.8 (L) 14.0 - 18.0 g/dL    Hematocrit 24.7 (L) 42.0 - 52.0 %    MCV 89.4 81.4 - 97.8 fL    MCH 28.6 27.0 - 33.0 pg    MCHC 32.0 (L) 33.7 - 35.3 g/dL    RDW 54.1 (H) 35.9 - 50.0 fL    Platelet Count 36 (LL) 164 - 446 K/uL    MPV 11.8 9.0 - 12.9 fL    Nucleated RBC 0.00 /100 WBC    NRBC (Absolute) 0.00 K/uL    Neutrophils-Polys 79.30 (H) 44.00 - 72.00 %    Lymphocytes 13.50 (L) 22.00 - 41.00 %    Monocytes 5.40 0.00 - 13.40 %    Eosinophils 1.80 0.00 - 6.90 %    Basophils 0.00 0.00 - 1.80 %    Neutrophils (Absolute) 2.93 1.82 - 7.42 K/uL    Lymphs (Absolute) 0.50 (L) 1.00 - 4.80 K/uL    Monos (Absolute) 0.20 0.00 - 0.85 K/uL    Eos (Absolute) 0.07 0.00 - 0.51 K/uL    Baso (Absolute) 0.00 0.00 - 0.12 K/uL    Anisocytosis 1+     Microcytosis 1+    DIFFERENTIAL MANUAL    Collection Time: 07/18/17  4:30 PM   Result Value Ref Range    Manual Diff Status PERFORMED    PERIPHERAL SMEAR REVIEW    Collection Time: 07/18/17  4:30 PM   Result Value Ref Range    Peripheral Smear Review see below    PLATELET ESTIMATE    Collection Time: 07/18/17  4:30 PM   Result Value Ref Range    Plt Estimation Marked Decrease    MORPHOLOGY    Collection Time: 07/18/17  4:30 PM   Result Value Ref Range    RBC Morphology Present      Large Platelets 1+     Poikilocytosis 1+     Ovalocytes 1+    IMMATURE PLT FRACTION    Collection Time: 07/18/17  4:30 PM   Result Value Ref Range    Imm. Plt Fraction 17.8 (H) 0.6 - 13.1 K/uL   ACCU-CHEK GLUCOSE    Collection Time: 07/18/17  7:04 PM   Result Value Ref Range    Glucose - Accu-Ck 121 (H) 65 - 99 mg/dL   ACCU-CHEK GLUCOSE    Collection Time: 07/18/17  9:03 PM   Result Value Ref Range    Glucose - Accu-Ck 121 (H) 65 - 99 mg/dL   Magnesium: AM Daily x 3 days    Collection Time: 07/19/17  4:26 AM   Result Value Ref Range    Magnesium 1.9 1.5 - 2.5 mg/dL   Ammonia: AM Daily x 3 days    Collection Time: 07/19/17  4:26 AM   Result Value Ref Range    Ammonia 56 (H) 11 - 45 umol/L   CBC WITH DIFFERENTIAL    Collection Time: 07/19/17  4:26 AM   Result Value Ref Range    WBC 4.5 (L) 4.8 - 10.8 K/uL    RBC 2.88 (L) 4.70 - 6.10 M/uL    Hemoglobin 8.2 (L) 14.0 - 18.0 g/dL    Hematocrit 24.9 (L) 42.0 - 52.0 %    MCV 86.5 81.4 - 97.8 fL    MCH 28.5 27.0 - 33.0 pg    MCHC 32.9 (L) 33.7 - 35.3 g/dL    RDW 52.2 (H) 35.9 - 50.0 fL    Platelet Count 52 (L) 164 - 446 K/uL    MPV 12.5 9.0 - 12.9 fL    Nucleated RBC 0.00 /100 WBC    NRBC (Absolute) 0.00 K/uL    Neutrophils-Polys 78.50 (H) 44.00 - 72.00 %    Lymphocytes 10.30 (L) 22.00 - 41.00 %    Monocytes 4.70 0.00 - 13.40 %    Eosinophils 6.50 0.00 - 6.90 %    Basophils 0.00 0.00 - 1.80 %    Neutrophils (Absolute) 3.53 1.82 - 7.42 K/uL    Lymphs (Absolute) 0.46 (L) 1.00 - 4.80 K/uL    Monos (Absolute) 0.21 0.00 - 0.85 K/uL    Eos (Absolute) 0.29 0.00 - 0.51 K/uL    Baso (Absolute) 0.00 0.00 - 0.12 K/uL    Anisocytosis 1+     Macrocytosis 1+     Microcytosis 1+    DIFFERENTIAL MANUAL    Collection Time: 07/19/17  4:26 AM   Result Value Ref Range    Manual Diff Status PERFORMED    PERIPHERAL SMEAR REVIEW    Collection Time: 07/19/17  4:26 AM   Result Value Ref Range    Peripheral Smear Review see below    MORPHOLOGY    Collection Time: 07/19/17  4:26 AM   Result Value  Ref Range    RBC Morphology Present    EKG: Every morning x 3 days    Collection Time: 17  5:34 AM   Result Value Ref Range    Report       [facility]    Test Date:  2017  Pt Name:    PATRICIA MUÑOZ               Department: [facility]  MRN:        [id number]                      Room:       [facility]  Gender:     M                            Technician: JUSTIN  :        1960                   Requested By:SAGE TRAN  Order #:    [id number]                    Reading MD: Kena Bernstein MD    Measurements  Intervals                                Axis  Rate:       93                           P:          -14  SD:         168                          QRS:        17  QRSD:       78                           T:          -17  QT:         348  QTc:        433    Interpretive Statements  SINUS RHYTHM  BORDERLINE T ABNORMALITIES, DIFFUSE LEADS  BASELINE WANDER IN LEAD(S) V6  Compared to ECG 2017 06:13:10  No significant changes    Electronically Signed On 2017 8:17:43 PDT by Kena Bernstein MD     ACCU-CHEK GLUCOSE    Collection Time: 17  6:49 AM   Result Value Ref Range    Glucose - Accu-Ck 106 (H) 65 - 99 mg/dL   ACCU-CHEK GLUCOSE    Collection Time: 17 10:27 AM   Result Value Ref Range    Glucose - Accu-Ck 112 (H) 65 - 99 mg/dL       Imaging  ABD XR:    1.  There is a nonobstructive nonspecific bowel gas pattern.    Assessment/Recommendations  ## UGIB due to varices  -Varices were banded when pt presented to the ED.  -Will continue to monitor hemoglobin and bowel movements.    ## RLQ pain  -No obstruction on abd xr  -Advanced diet to soft mechanical  -Ordered metamucil

## 2017-07-20 NOTE — PROGRESS NOTES
[facility] Progress Note    Date of Service: 2017    Chief Complaint  57 y.o. male admitted 2017 hematemesis, variceal bleeding, hemorrhagic shock.  Known cirrhosis, ongoing alcohol at 6 pack daily.  Transfused 4 units PRBC, platelets and plasma, aggressive fluid rescussitation, had EGD with 7 bands.    Interval Problem Update  Confused.  Increase in agitation likely alcohol withdrawal overnight requiring Posey vest and this morning required security for aid in putting in soft restraints bilateral wrist. When I went to evaluation he is calm nods but confused. I will leave him in the ICU for now secondary to need of close one-on-one with his alcohol withdrawal reevaluate in the morning. Care discussed in ICU multidisciplinary rounds.    Consultants/Specialty  Pulmonary  GI    Disposition  Transfer out of ICU.        Review of Systems   Unable to perform ROS: mental acuity      Physical Exam  Laboratory/Imaging   Hemodynamics  Temp (24hrs), Av.3 °C (99.2 °F), Min:37 °C (98.6 °F), Max:37.9 °C (100.3 °F)   Temperature: 37 °C (98.6 °F)  Pulse  Av.1  Min: 80  Max: 131 Heart Rate (Monitored): 86  NIBP: 116/62 mmHg      Respiratory      Respiration: (!) 28, Pulse Oximetry: 96 %     Work Of Breathing / Effort: Moderate  RUL Breath Sounds: Clear, RML Breath Sounds: Expiratory Wheezes, RLL Breath Sounds: Clear, LESLEY Breath Sounds: Clear, LLL Breath Sounds: Clear    Fluids    Intake/Output Summary (Last 24 hours) at 17 1614  Last data filed at 17 1500   Gross per 24 hour   Intake    840 ml   Output    453 ml   Net    387 ml       Nutrition  Orders Placed This Encounter   Procedures   • DIET NPO     Standing Status: Standing      Number of Occurrences: 1      Standing Expiration Date:      Order Specific Question:  Restrict to:     Answer:  Strict [1]     Physical Exam   Constitutional: He appears well-developed. He appears lethargic. No distress. He is restrained.   overweight.    HENT:    Head: Normocephalic and atraumatic.   Nose: Nose normal.   Eyes: Conjunctivae and EOM are normal. Right eye exhibits no discharge. Left eye exhibits no discharge. No scleral icterus.   Neck: No tracheal deviation present.   Cardiovascular: Normal rate, regular rhythm, normal heart sounds and intact distal pulses.    No murmur heard.  Pulmonary/Chest: Effort normal and breath sounds normal. No respiratory distress. He has no wheezes.   Abdominal: Soft. Bowel sounds are normal. He exhibits no distension. There is no tenderness. There is no guarding.   No tympany on percussion   Musculoskeletal: He exhibits no edema.   Lymphadenopathy:     He has no cervical adenopathy.   Neurological: He appears lethargic. He is disoriented. No cranial nerve deficit.   Skin: Skin is warm. He is not diaphoretic.   Psychiatric: His affect is inappropriate. His speech is delayed. Cognition and memory are impaired. He expresses inappropriate judgment.   Vitals reviewed.      Recent Labs      07/19/17   0426  07/19/17   1515  07/20/17   0336   WBC  4.5*  5.7  4.7*   RBC  2.88*  2.97*  2.74*   HEMOGLOBIN  8.2*  8.5*  7.9*   HEMATOCRIT  24.9*  26.0*  24.3*   MCV  86.5  87.5  88.7   MCH  28.5  28.6  28.8   MCHC  32.9*  32.7*  32.5*   RDW  52.2*  53.1*  53.1*   PLATELETCT  52*  41*  38*   MPV  12.5  13.5*  12.9     Recent Labs      07/18/17   0414  07/19/17   1515  07/20/17   0336   SODIUM  138  135  133*   POTASSIUM  4.0  3.5*  3.5*   CHLORIDE  114*  109  108   CO2  19*  20  22   GLUCOSE  64*  109*  76   BUN  19  16  15   CREATININE  1.05  0.76  0.73   CALCIUM  7.5*  7.9*  7.9*     Recent Labs      07/20/17   0336   INR  1.52*                  Assessment/Plan     * Hypovolemic shock (CMS-HCC)  Assessment & Plan  Massive transfusion completed  Monitor Vitals, I/O  Tolerating diet    Acute gastrointestinal hemorrhage  Assessment & Plan  Status post massive transfusion.  He had 7 variceals banded on EGD.  Octreotide drip and Protonix drip  stopped July 18 by GI  Transfer out of ICU once more stable from alcohol withdrawal standpoint  Empiric Rocephin  GI assisting/consultation  Monitor hemoglobin and face of ongoing melena which is likely residual blood passing.    ARF (acute renal failure) (CMS-HCC)  Assessment & Plan  Monitor BMP   Improved creatinine July 18th    Acute blood loss anemia  Assessment & Plan  S/p transfusions, follow hgb, INR  Transfuse for hemoglobin less than 7 or hematocrit less than 21    Cirrhosis (CMS-HCC) (present on admission)  Assessment & Plan  Check ammonia level. Monitor anasarca/swelling    Alcoholism (CMS-HCC)  Assessment & Plan  CIWA, vitamins  Monitor vitals  He is in restraints  He will remain in the ICU for now  When necessary Ativan      Low serum magnesium level  Assessment & Plan  Replaced July 18.    Pain of upper abdomen  Assessment & Plan  Normal lactic acid July 19. Await CBC areas check lipase.  Ultrasound abdomen right upper quadrant given elevated LFTs  Could be ongoing gastritis continue with proton pump inhibitor  Pain control  Abdominal x-ray reviewed    Fever  Assessment & Plan  Antibiotic  Monitoring CBC. July 19 normal lactic acid recently normal urine analysis monitor vitals      Radiology images reviewed, Labs reviewed and Medications reviewed  Allan catheter: No Allan

## 2017-07-20 NOTE — CARE PLAN
Problem: Safety  Goal: Will remain free from falls  Intervention: Implement fall precautions  Fall precautions implemented as charted. Surrounding staff aware. Bed alarm on.       Problem: Skin Integrity  Goal: Risk for impaired skin integrity will decrease  Intervention: Implement precautions to protect skin integrity in collaboration with the interdisciplinary team  Barrier cream / wipes used for pericare. Assisted turning q 2 hrs while restrained. HOB up 30+ degrees.

## 2017-07-20 NOTE — CARE PLAN
Problem: Psychosocial Needs:  Goal: Level of anxiety will decrease  Outcome: PROGRESSING AS EXPECTED  Pt exhibiting some signs of anxiety/withdrawal. CIWA score 7. 0.5 mg ativan given per CIWA orders.

## 2017-07-20 NOTE — PROGRESS NOTES
Dion from Lab called with critical result of platelets 38 at 0436. Critical lab result read back to Dion.   Dr. Wagner notified of critical lab result at 0445.  Critical lab result read back by Dr. Wagner. No new orders.

## 2017-07-20 NOTE — CARE PLAN
Problem: Safety  Goal: Will remain free from falls  Outcome: PROGRESSING AS EXPECTED  Pt remains free from falls. Risk factors for falls include impulsivity, confusion, restlessness, previous fall, and withdrawal. Fall precautions continued. Fall risk interventions in place include bed alarm, purposeful rounding q1h, personal items and call light within reach, staff with patient at all times when OOB, frequent reorientation, and CIWA assessments q4h treated with PRN ativan. Fall risk education completed with patient.

## 2017-07-21 NOTE — PROGRESS NOTES
[facility]ist Progress Note    Date of Service: 2017    Chief Complaint  57 y.o. male admitted 2017 hematemesis, variceal bleeding, hemorrhagic shock.  Known cirrhosis, ongoing alcohol at 6 pack daily.  Transfused 4 units PRBC, platelets and plasma, aggressive fluid rescussitation, had EGD with 7 bands.    Interval Problem Update  Remains in restraints. Unable to follow. He did remain calm for me but afterwards started trying to get up out of bed and straining against restraints. His speech is mumbled is disoriented. Care discussed with ICU nursing.    Consultants/Specialty  Pulmonary  GI    Disposition  Transfer out of ICU once stable        Review of Systems   Unable to perform ROS: mental acuity      Physical Exam  Laboratory/Imaging   Hemodynamics  Temp (24hrs), Av.9 °C (98.5 °F), Min:36.7 °C (98 °F), Max:37.2 °C (98.9 °F)   Temperature: 36.7 °C (98 °F)  Pulse  Av.8  Min: 64  Max: 131 Heart Rate (Monitored): 79  NIBP: 120/77 mmHg      Respiratory      Respiration: (!) 26, Pulse Oximetry: 99 %     Work Of Breathing / Effort: Moderate  RUL Breath Sounds: Clear, RML Breath Sounds: Clear, RLL Breath Sounds: Clear;Diminished, LESLEY Breath Sounds: Clear, LLL Breath Sounds: Clear;Diminished    Fluids    Intake/Output Summary (Last 24 hours) at 17 1343  Last data filed at 17 0800   Gross per 24 hour   Intake   1165 ml   Output    465 ml   Net    700 ml       Nutrition  Orders Placed This Encounter   Procedures   • DIET ORDER     Standing Status: Standing      Number of Occurrences: 1      Standing Expiration Date:      Order Specific Question:  Diet:     Answer:  Low Fiber(GI Soft) [2]     Order Specific Question:  Texture/Fiber modifications:     Answer:  Dysphagia 3(Mechanical Soft)specify fluid consistency(question 6) [3]     Physical Exam   Constitutional: He appears well-developed. He appears lethargic. No distress. He is restrained.   overweight.    HENT:   Head: Normocephalic and  atraumatic.   Nose: Nose normal.   Eyes: Conjunctivae and EOM are normal. Right eye exhibits no discharge. Left eye exhibits no discharge. No scleral icterus.   Neck: No tracheal deviation present.   Cardiovascular: Normal rate, regular rhythm, normal heart sounds and intact distal pulses.    No murmur heard.  Pulmonary/Chest: Effort normal and breath sounds normal. No respiratory distress. He has no wheezes.   Abdominal: Soft. Bowel sounds are normal. He exhibits no distension. There is no tenderness. There is no guarding.   Musculoskeletal: He exhibits no edema.   Lymphadenopathy:     He has no cervical adenopathy.   Neurological: He appears lethargic. He is disoriented. No cranial nerve deficit.   Skin: Skin is warm. He is not diaphoretic.   Psychiatric: His affect is inappropriate. His speech is delayed. Cognition and memory are impaired. He expresses inappropriate judgment.   Vitals reviewed.      Recent Labs      07/19/17   1515  07/20/17   0336  07/21/17   0430   WBC  5.7  4.7*  4.7*   RBC  2.97*  2.74*  2.92*   HEMOGLOBIN  8.5*  7.9*  8.3*   HEMATOCRIT  26.0*  24.3*  25.8*   MCV  87.5  88.7  88.4   MCH  28.6  28.8  28.4   MCHC  32.7*  32.5*  32.2*   RDW  53.1*  53.1*  53.8*   PLATELETCT  41*  38*  39*   MPV  13.5*  12.9  12.8     Recent Labs      07/19/17   1515  07/20/17   0336  07/21/17   0430   SODIUM  135  133*  136   POTASSIUM  3.5*  3.5*  3.6   CHLORIDE  109  108  110   CO2  20  22  22   GLUCOSE  109*  76  78   BUN  16  15  9   CREATININE  0.76  0.73  0.65   CALCIUM  7.9*  7.9*  7.9*     Recent Labs      07/20/17   0336   INR  1.52*                  Assessment/Plan     * Hypovolemic shock (CMS-HCC)  Assessment & Plan  Resolved   Massive transfusion completed  Monitor Vitals, I/O  Tolerating diet    Acute gastrointestinal hemorrhage  Assessment & Plan  Status post massive transfusion.  He had 7 variceals banded on EGD.  Octreotide drip and Protonix drip stopped July 18 by GI  Transfer out of ICU once  more stable from alcohol withdrawal standpoint  Empiric Rocephin  GI assisting/consultation  Monitor hemoglobin, CBC    ARF (acute renal failure) (CMS-[facility])  Assessment & Plan  Monitor BMP   Improved creatinine July 18th    Acute blood loss anemia  Assessment & Plan  S/p transfusions, follow hgb, INR  Transfuse for hemoglobin less than 7 or hematocrit less than 21    Cirrhosis (CMS-[facility]) (present on admission)  Assessment & Plan  Downtrend in ammonia level    Alcoholism (CMS-HCC)  Assessment & Plan  CIWA, vitamins  Monitor vitals  He is in restraints  He will remain in the ICU for now  When necessary Ativan    Low serum magnesium level  Assessment & Plan  Replaced July 18.    Pain of upper abdomen  Assessment & Plan  Normal lactic acid July 19. Await CBC areas check lipase.  Ultrasound abdomen:Findings consistent with cirrhosis. Moderate ascites. Cholelithiasis. Limited evaluation of pancreas. Minimal RIGHT perinephric fluid, nonspecific.  Could be ongoing gastritis continue with proton pump inhibitor    Fever  Assessment & Plan  Antibiotic  Monitoring CBC. July 19 normal lactic acid recently normal urine analysis monitor vitals    Increased ammonia level  Assessment & Plan  Downtrending    Hypokalemia  Assessment & Plan  Improved      Labs reviewed and Medications reviewed  Allan catheter: No Allan

## 2017-07-21 NOTE — PROGRESS NOTES
Sil from Lab called with critical result of platelets 39 at 0450. Critical lab result read back to Sil.   This critical lab result is within parameters established by Dr. Wagner for this patient. Dr. Wagner called yesterday for platelet count of 38, no new orders. No s/s of bleeding noted.

## 2017-07-21 NOTE — PROGRESS NOTES
Gastroenterology Progress Note     Author: Usama Keenan   Date & Time Created: 7/21/2017 1:46 PM    Interval History:  Remains agitated. No further gross bleeding.     Review of Systems:  Review of Systems   Unable to perform ROS: mental status change       Physical Exam:  Physical Exam   Constitutional: He appears lethargic. He is uncooperative.   Eyes: No scleral icterus.   Cardiovascular: Normal rate and regular rhythm.    Pulmonary/Chest: Effort normal and breath sounds normal.   Abdominal: Soft. Bowel sounds are normal.   Neurological: He appears lethargic.   Skin: Skin is warm and dry.   Nursing note and vitals reviewed.      Labs:        Invalid input(s): [id number]      Recent Labs      07/19/17 0426 07/19/17 1515 07/20/17 0336 07/21/17   0430   SODIUM   --   135  133*  136   POTASSIUM   --   3.5*  3.5*  3.6   CHLORIDE   --   109  108  110   CO2   --   20  22  22   BUN   --   16  15  9   CREATININE   --   0.76  0.73  0.65   MAGNESIUM  1.9   --   1.9   --    CALCIUM   --   7.9*  7.9*  7.9*     Recent Labs      07/19/17 1515 07/20/17 0336 07/21/17   0430   ALTSGPT  84*   --    --    ASTSGOT  114*   --    --    ALKPHOSPHAT  73   --    --    TBILIRUBIN  2.8*   --    --    LIPASE  54   --    --    GLUCOSE  109*  76  78     Recent Labs      07/19/17 1515 07/20/17 0336 07/21/17   0430   RBC  2.97*  2.74*  2.92*   HEMOGLOBIN  8.5*  7.9*  8.3*   HEMATOCRIT  26.0*  24.3*  25.8*   PLATELETCT  41*  38*  39*   PROTHROMBTM   --   18.8*   --    INR   --   1.52*   --      Recent Labs      07/19/17 0426 07/19/17 1515 07/20/17 0336 07/21/17   0430   WBC  4.5*  5.7  4.7*  4.7*   NEUTSPOLYS  78.50*  76.70*  79.50*   --    LYMPHOCYTES  10.30*  10.50*  8.90*   --    MONOCYTES  4.70  8.90  1.80   --    EOSINOPHILS  6.50  3.30  8.00*   --    BASOPHILS  0.00  0.30  0.90   --    ASTSGOT   --   114*   --    --    ALTSGPT   --   84*   --    --    ALKPHOSPHAT   --   73   --    --    TBILIRUBIN    --   2.8*   --    --      Hemodynamics:  Temp (24hrs), Av.9 °C (98.5 °F), Min:36.7 °C (98 °F), Max:37.2 °C (98.9 °F)  Temperature: 36.7 °C (98 °F)  Pulse  Av.8  Min: 64  Max: 131Heart Rate (Monitored): 79  NIBP: 120/77 mmHg     Respiratory:    Respiration: (!) 26, Pulse Oximetry: 99 %     Work Of Breathing / Effort: Moderate  RUL Breath Sounds: Clear, RML Breath Sounds: Clear, RLL Breath Sounds: Clear;Diminished, LESLEY Breath Sounds: Clear, LLL Breath Sounds: Clear;Diminished  Fluids:    Intake/Output Summary (Last 24 hours) at 17 1346  Last data filed at 17 0800   Gross per 24 hour   Intake   1165 ml   Output    465 ml   Net    700 ml     Weight: 104.6 kg (230 lb 9.6 oz)  GI/Nutrition:  Orders Placed This Encounter   Procedures   • DIET ORDER     Standing Status: Standing      Number of Occurrences: 1      Standing Expiration Date:      Order Specific Question:  Diet:     Answer:  Low Fiber(GI Soft) [2]     Order Specific Question:  Texture/Fiber modifications:     Answer:  Dysphagia 3(Mechanical Soft)specify fluid consistency(question 6) [3]     Medical Decision Making, by Problem:  Active Hospital Problems    Diagnosis   • Acute gastrointestinal hemorrhage: Variceal hemorrhage s/p banding.    • ARF (acute renal failure): Resolving.    • Acute blood loss anemia: Stable.    • Encephalopathy   • Alcoholism (CMS-HCC) [F10.20]   • Cirrhosis (CMS-HCC) [K74.60]       Plan:  Continue PPI.  Start lactulose syrup tid.       Core Measures

## 2017-07-21 NOTE — CARE PLAN
Problem: Safety  Goal: Will remain free from injury  Outcome: PROGRESSING AS EXPECTED  Goal: Will remain free from falls  Outcome: PROGRESSING AS EXPECTED  Pt remains free from falls. Fall risk interventions in place. Bilateral wrist restraints and vest in place.    Problem: Skin Integrity  Goal: Risk for impaired skin integrity will decrease  Outcome: PROGRESSING AS EXPECTED  Q2h turns, bony prominences elevated off of bed, pt moves around frequently in bed. No evidence of new skin breakdown. Danial scale assessment qshift- see flowsheets.

## 2017-07-21 NOTE — CARE PLAN
Problem: Safety  Goal: Will remain free from falls  Intervention: Implement fall precautions  Fall precautions in effect as charted.       Problem: Skin Integrity  Goal: Risk for impaired skin integrity will decrease  Intervention: Implement precautions to protect skin integrity in collaboration with the interdisciplinary team  Turning q2 hr and PRN. Using barrier wipes for pericare.
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
## 2017-07-22 NOTE — PROGRESS NOTES
[facility] Hospitalist Progress Note    Date of Service: 2017    Chief Complaint  57 y.o. male admitted 2017 hematemesis, variceal bleeding, hemorrhagic shock.  Known cirrhosis, ongoing alcohol at 6 pack daily.  Transfused 4 units PRBC, platelets and plasma, aggressive fluid rescussitation, had EGD with 7 bands.    Interval Problem Update  Oriented to himself. Verbally and physically aggressive per RN.  Receiving ativan prn.  Remains restrained.  Ate meal with assistance.      Consultants/Specialty  Pulmonary  GI    Disposition  Transfer out of ICU once stable        Review of Systems   Unable to perform ROS: mental acuity      Physical Exam  Laboratory/Imaging   Hemodynamics  Temp (24hrs), Av.7 °C (98 °F), Min:36.6 °C (97.8 °F), Max:36.8 °C (98.2 °F)   Temperature: 36.6 °C (97.8 °F)  Pulse  Av  Min: 64  Max: 131 Heart Rate (Monitored): 79  NIBP: 118/71 mmHg      Respiratory      Respiration: 16, Pulse Oximetry: 97 %     Work Of Breathing / Effort: Moderate  RUL Breath Sounds: Clear, RML Breath Sounds: Clear, RLL Breath Sounds: Clear;Diminished, LESLEY Breath Sounds: Clear, LLL Breath Sounds: Clear;Diminished    Fluids    Intake/Output Summary (Last 24 hours) at 17 0718  Last data filed at 17 0600   Gross per 24 hour   Intake   1300 ml   Output    300 ml   Net   1000 ml       Nutrition  Orders Placed This Encounter   Procedures   • DIET ORDER     Standing Status: Standing      Number of Occurrences: 1      Standing Expiration Date:      Order Specific Question:  Diet:     Answer:  Low Fiber(GI Soft) [2]     Order Specific Question:  Texture/Fiber modifications:     Answer:  Dysphagia 3(Mechanical Soft)specify fluid consistency(question 6) [3]     Physical Exam   Constitutional: He appears well-developed. He appears lethargic. No distress. He is restrained.   overweight.    HENT:   Head: Normocephalic and atraumatic.   Nose: Nose normal.   Eyes: Conjunctivae and EOM are normal. Right eye  exhibits no discharge. Left eye exhibits no discharge. No scleral icterus.   Neck: No tracheal deviation present.   Cardiovascular: Normal rate, regular rhythm, normal heart sounds and intact distal pulses.    No murmur heard.  Pulmonary/Chest: Effort normal and breath sounds normal. No respiratory distress. He has no wheezes.   Abdominal: Soft. Bowel sounds are normal. He exhibits no distension. There is no tenderness. There is no guarding.   Musculoskeletal: He exhibits no edema.   Lymphadenopathy:     He has no cervical adenopathy.   Neurological: He appears lethargic. He is disoriented. No cranial nerve deficit.   Skin: Skin is warm. He is not diaphoretic.   Psychiatric: His affect is inappropriate. His speech is delayed. He is agitated and slowed. Thought content is delusional. Cognition and memory are impaired. He expresses inappropriate judgment.   Vitals reviewed.      Recent Labs      07/19/17   1515  07/20/17   0336  07/21/17   0430   WBC  5.7  4.7*  4.7*   RBC  2.97*  2.74*  2.92*   HEMOGLOBIN  8.5*  7.9*  8.3*   HEMATOCRIT  26.0*  24.3*  25.8*   MCV  87.5  88.7  88.4   MCH  28.6  28.8  28.4   MCHC  32.7*  32.5*  32.2*   RDW  53.1*  53.1*  53.8*   PLATELETCT  41*  38*  39*   MPV  13.5*  12.9  12.8     Recent Labs      07/19/17   1515  07/20/17   0336  07/21/17   0430   SODIUM  135  133*  136   POTASSIUM  3.5*  3.5*  3.6   CHLORIDE  109  108  110   CO2  20  22  22   GLUCOSE  109*  76  78   BUN  16  15  9   CREATININE  0.76  0.73  0.65   CALCIUM  7.9*  7.9*  7.9*     Recent Labs      07/20/17   0336   INR  1.52*                  Assessment/Plan     * Hypovolemic shock (CMS-HCC)  Assessment & Plan  Resolved   Massive transfusion completed  Monitor Vitals, I/O  Tolerating diet    Acute gastrointestinal hemorrhage  Assessment & Plan  Status post massive transfusion.  He had 7 variceals banded on EGD.  Octreotide drip and Protonix drip stopped July 18 by GI  Transfer out of ICU once more stable from alcohol  withdrawal standpoint  Empiric Rocephin  GI assisting/consultation  Monitor hemoglobin, CBC    ARF (acute renal failure) (CMS-HCC)  Assessment & Plan  Monitor BMP   Improved creatinine July 18th    Acute blood loss anemia  Assessment & Plan  S/p transfusions, follow hgb, INR  Transfuse for hemoglobin less than 7 or hematocrit less than 21    Cirrhosis (CMS-HCC) (present on admission)  Assessment & Plan  Downtrend in ammonia level    Alcoholism (CMS-HCC)  Assessment & Plan  CIWA, vitamins  Monitor vitals  He is in restraints  He will remain in the ICU for now  When necessary Ativan    Low serum magnesium level  Assessment & Plan  Replaced July 18.    Pain of upper abdomen  Assessment & Plan  Normal lactic acid July 19. Await CBC areas check lipase.  Ultrasound abdomen:Findings consistent with cirrhosis. Moderate ascites. Cholelithiasis. Limited evaluation of pancreas. Minimal RIGHT perinephric fluid, nonspecific.  Could be ongoing gastritis continue with proton pump inhibitor    Fever  Assessment & Plan  Antibiotic  Monitoring CBC. July 19 normal lactic acid recently normal urine analysis monitor vitals    Increased ammonia level  Assessment & Plan  Downtrending    Hypokalemia  Assessment & Plan  Improved      Labs reviewed and Medications reviewed  Allan catheter: No Allan

## 2017-07-22 NOTE — CARE PLAN
Problem: Safety  Goal: Will remain free from injury  Outcome: PROGRESSING AS EXPECTED  Restraints remain in place. Purposeful rounding q1h. Q2h turns. Pt remains free from injury.    Problem: Infection  Goal: Will remain free from infection  Outcome: PROGRESSING AS EXPECTED  No s/s of infection. Handwashing performed. Central line remains in place for lack of peripheral access.

## 2017-07-23 NOTE — PROGRESS NOTES
Gastroenterology Progress Note     Author: Silas Nikijtammyyi   Date & Time Created: 7/23/2017 12:42 PM    Interval History:  In posse complaining that he is cold  Disoriented  And abusive verbally  Not taking all his meds or eating    Review of Systems:  Review of Systems   Constitutional: Negative.    HENT: Negative.    Eyes: Negative.    Respiratory: Negative.    Cardiovascular: Negative.    Gastrointestinal: Negative.    Genitourinary: Negative.    Musculoskeletal: Negative.    Skin: Negative.        Physical Exam:  Physical Exam   Constitutional: He is oriented to person, place, and time. He appears well-developed. He appears lethargic. He is uncooperative.   HENT:   Head: Normocephalic.   Eyes: Pupils are equal, round, and reactive to light. Right eye exhibits no discharge. Scleral icterus is present.   Neck: Normal range of motion.   Cardiovascular: Normal rate and regular rhythm.    Pulmonary/Chest: Effort normal and breath sounds normal.   Abdominal: Soft. Bowel sounds are normal.   Neurological: He is oriented to person, place, and time. He appears lethargic.   Skin: Skin is warm and dry.   Nursing note and vitals reviewed.      Labs:        Invalid input(s): [id number]      Recent Labs      07/21/17 0430 07/23/17 0410   SODIUM  136  140   POTASSIUM  3.6  3.7   CHLORIDE  110  114*   CO2  22  24   BUN  9  8   CREATININE  0.65  0.57   CALCIUM  7.9*  8.2*     Recent Labs      07/21/17 0430 07/23/17 0410   ALTSGPT   --   48   ASTSGOT   --   48*   ALKPHOSPHAT   --   91   TBILIRUBIN   --   2.1*   GLUCOSE  78  92     Recent Labs      07/21/17 0430 07/23/17 0410   RBC  2.92*  3.02*   HEMOGLOBIN  8.3*  8.6*   HEMATOCRIT  25.8*  27.0*   PLATELETCT  39*  50*     Recent Labs      07/21/17 0430 07/23/17 0410   WBC  4.7*  4.3*   NEUTSPOLYS   --   62.50   LYMPHOCYTES   --   19.30*   MONOCYTES   --   14.70*   EOSINOPHILS   --   2.80   BASOPHILS   --   0.20   ASTSGOT   --   48*   ALTSGPT   --   48    ALKPHOSPHAT   --   91   TBILIRUBIN   --   2.1*     Hemodynamics:  Temp (24hrs), Av.7 °C (98 °F), Min:36.3 °C (97.4 °F), Max:36.9 °C (98.5 °F)  Temperature: 36.9 °C (98.5 °F)  Pulse  Av  Min: 64  Max: 131Heart Rate (Monitored): 80  NIBP: 129/67 mmHg     Respiratory:    Respiration: 18, Pulse Oximetry: 98 %     Work Of Breathing / Effort: Mild;Moderate  RUL Breath Sounds: Clear, RML Breath Sounds: Clear, RLL Breath Sounds: Diminished, LESLEY Breath Sounds: Clear, LLL Breath Sounds: Diminished  Fluids:    Intake/Output Summary (Last 24 hours) at 17 1346  Last data filed at 17 0800   Gross per 24 hour   Intake   1165 ml   Output    465 ml   Net    700 ml        GI/Nutrition:  Orders Placed This Encounter   Procedures   • DIET ORDER     Standing Status: Standing      Number of Occurrences: 1      Standing Expiration Date:      Order Specific Question:  Diet:     Answer:  Low Fiber(GI Soft) [2]     Order Specific Question:  Texture/Fiber modifications:     Answer:  Dysphagia 3(Mechanical Soft)specify fluid consistency(question 6) [3]     Medical Decision Making, by Problem:  Active Hospital Problems    Diagnosis   • Acute gastrointestinal hemorrhage: Variceal hemorrhage s/p banding.    • ARF (acute renal failure): Resolving.    • Acute blood loss anemia: Stable.    • Encephalopathy   • Alcoholism (CMS-HCC) [F10.20]   • Cirrhosis (CMS-HCC) [K74.60]       Plan:  Continue PPI change to orals 40 mg po bid  Start lactulose syrup tid.  Continue supportive care  Start on diet and advance to low salt hepatic diet  Will need follow up as o/p for repeat banding  Once stable hemodynamically consider starting on Propranolol 10 mg bid-tid pending SBP  AA program  Start rifaximin to help with encephalopathy as well.  Hold sedating meds to see if he clears  Little more to add at this time  Call if any further issues [phone]         Core Measures

## 2017-07-23 NOTE — CARE PLAN
Problem: Safety  Goal: Will remain free from injury  Outcome: PROGRESSING SLOWER THAN EXPECTED  Patient educated about the importance of not getting up without staff present. Wrist restraints in place, patient educated, no evidence of learning. Call light within reach. Bed alarm on and bed is locked. Non-slip socks on.     Problem: Skin Integrity  Goal: Risk for impaired skin integrity will decrease  Outcome: PROGRESSING AS EXPECTED  Patient turned Q2h. Lines protected to prevent skin breakdown. Mackenzie cream applied to rash on peritoneal area. Cleaned when incontinent.

## 2017-07-23 NOTE — CARE PLAN
Problem: Knowledge Deficit  Goal: Knowledge of disease process/condition, treatment plan, diagnostic tests, and medications will improve  Outcome: PROGRESSING SLOWER THAN EXPECTED

## 2017-07-23 NOTE — PROGRESS NOTES
Patient refusing all po medication and becomes physically aggressive at times with lower extremities.

## 2017-07-23 NOTE — PROGRESS NOTES
[facility]ist Progress Note    Date of Service: 2017    Chief Complaint  57 y.o. male admitted 2017 hematemesis, variceal bleeding, hemorrhagic shock.  Known cirrhosis, ongoing alcohol at 6 pack daily.  Transfused 4 units PRBC, platelets and plasma, aggressive fluid rescussitation, had EGD with 7 bands.    Interval Problem Update  Aggressive overnight.  Nursing states for department of Ativan. She'll be initiated on Librium. Patient more alert this morning seems calm. Care discussed in a.m. I seem multidisciplinary rounds. Hold transfer floor until more appropriate.    Consultants/Specialty  Pulmonary  GI    Disposition  Transfer out of ICU once stable        Review of Systems   Unable to perform ROS: mental acuity      Physical Exam  Laboratory/Imaging   Hemodynamics  Temp (24hrs), Av.7 °C (98 °F), Min:36.3 °C (97.4 °F), Max:36.8 °C (98.3 °F)   Temperature: 36.6 °C (97.8 °F)  Pulse  Av.4  Min: 64  Max: 131 Heart Rate (Monitored): 67  NIBP: 137/70 mmHg      Respiratory      Respiration: 18, Pulse Oximetry: 98 %     Work Of Breathing / Effort: Mild;Severe  RUL Breath Sounds: Clear, RML Breath Sounds: Clear, RLL Breath Sounds: Clear;Diminished, LESLEY Breath Sounds: Clear, LLL Breath Sounds: Clear;Diminished    Fluids    Intake/Output Summary (Last 24 hours) at 17 0626  Last data filed at 17 1600   Gross per 24 hour   Intake    625 ml   Output    200 ml   Net    425 ml       Nutrition  Orders Placed This Encounter   Procedures   • DIET ORDER     Standing Status: Standing      Number of Occurrences: 1      Standing Expiration Date:      Order Specific Question:  Diet:     Answer:  Low Fiber(GI Soft) [2]     Order Specific Question:  Texture/Fiber modifications:     Answer:  Dysphagia 3(Mechanical Soft)specify fluid consistency(question 6) [3]     Physical Exam   Constitutional: He appears well-developed. No distress. He is restrained.   overweight.    HENT:   Head: Normocephalic and  atraumatic.   Nose: Nose normal.   Eyes: Conjunctivae and EOM are normal. Right eye exhibits no discharge. Left eye exhibits no discharge. No scleral icterus.   Neck: No tracheal deviation present.   Cardiovascular: Normal rate, regular rhythm, normal heart sounds and intact distal pulses.    No murmur heard.  Pulmonary/Chest: Effort normal and breath sounds normal. No stridor. No respiratory distress. He has no wheezes.   Abdominal: Soft. Bowel sounds are normal. He exhibits no distension. There is no tenderness.   Musculoskeletal: He exhibits no edema.   Neurological: He is alert. He is disoriented. No cranial nerve deficit.   Skin: Skin is warm. He is not diaphoretic.   Psychiatric: His affect is inappropriate. His speech is delayed. He is agitated and slowed. Thought content is delusional. Cognition and memory are impaired. He expresses inappropriate judgment.   Vitals reviewed.      Recent Labs      07/21/17 0430 07/23/17   0410   WBC  4.7*  4.3*   RBC  2.92*  3.02*   HEMOGLOBIN  8.3*  8.6*   HEMATOCRIT  25.8*  27.0*   MCV  88.4  89.4   MCH  28.4  28.5   MCHC  32.2*  31.9*   RDW  53.8*  59.7*   PLATELETCT  39*  50*   MPV  12.8  12.8     Recent Labs      07/21/17   0430  07/23/17   0410   SODIUM  136  140   POTASSIUM  3.6  3.7   CHLORIDE  110  114*   CO2  22  24   GLUCOSE  78  92   BUN  9  8   CREATININE  0.65  0.57   CALCIUM  7.9*  8.2*                      Assessment/Plan     * Hypovolemic shock (CMS-HCC)  Assessment & Plan  Resolved   Massive transfusion completed  Monitor Vitals, I/O  Tolerating diet    Acute gastrointestinal hemorrhage  Assessment & Plan  Status post massive transfusion.  He had 7 variceals banded on EGD.  Octreotide drip and Protonix drip stopped July 18 by GI  Transfer out of ICU once more stable from alcohol withdrawal standpoint  Empiric Rocephin  GI assisting/consultation  Monitor hemoglobin, CBC    ARF (acute renal failure) (CMS-HCC)  Assessment & Plan  Monitor BMP   Improved  creatinine July 18th    Acute blood loss anemia  Assessment & Plan  S/p transfusions, follow hgb, INR  Transfuse for hemoglobin less than 7 or hematocrit less than 21    Cirrhosis (CMS-HCC) (present on admission)  Assessment & Plan  Downtrend in ammonia level    Alcoholism (CMS-HCC)  Assessment & Plan  CIWA, vitamins  Monitor vitals  He is in restraints  He will remain in the ICU for now  When necessary Ativan    Low serum magnesium level  Assessment & Plan  Replaced July 18.    Pain of upper abdomen  Assessment & Plan  Normal lactic acid July 19. Await CBC areas check lipase.  Ultrasound abdomen:Findings consistent with cirrhosis. Moderate ascites. Cholelithiasis. Limited evaluation of pancreas. Minimal RIGHT perinephric fluid, nonspecific.  Could be ongoing gastritis continue with proton pump inhibitor    Fever  Assessment & Plan  Antibiotic  Monitoring CBC. July 19 normal lactic acid recently normal urine analysis monitor vitals    Increased ammonia level  Assessment & Plan  Downtrending    Hypokalemia  Assessment & Plan  Improved      Labs reviewed and Medications reviewed  Allan catheter: No Allan

## 2017-07-23 NOTE — CARE PLAN
Problem: Fluid Volume:  Goal: Will maintain balanced intake and output  Outcome: PROGRESSING AS EXPECTED  Patient encouraged to drink water and eat meals. Assisted by nurse Q1 hour. Feeding assisted by nurse with meals. Urinary catheter in place. Output is being monitored.     Problem: Skin Integrity  Goal: Risk for impaired skin integrity will decrease  Outcome: PROGRESSING AS EXPECTED  Skin care completed, moisturizer, barrier wipes and turns [id number]. Positioning will be changed to prevent breakdown and lines will be protected/without body weight to prevent breakdown. Mackenzie cream applied to rash with turns/incontinence. Pillows used to reposition and float heels.

## 2017-07-24 NOTE — PROGRESS NOTES
[facility] Hospitalist Progress Note    Date of Service: 2017    Chief Complaint  57 y.o. male admitted 2017 hematemesis, variceal bleeding, hemorrhagic shock.  Known cirrhosis, ongoing alcohol at 6 pack daily.  Transfused 4 units PRBC, platelets and plasma, aggressive fluid rescussitation, had EGD with 7 bands.    Interval Problem Update  Alert x 1.  Hallucinating.  On and off combative.  Sinus rhythm.  On 2L.  Dysphagia diet. Calm enough to transfer to medical floor.  Care discussed with ICU RN.      Consultants/Specialty  Pulmonary  GI    Disposition  Transfer out of ICU once stable        Review of Systems   Unable to perform ROS: mental acuity      Physical Exam  Laboratory/Imaging   Hemodynamics  Temp (24hrs), Av.8 °C (98.3 °F), Min:36.6 °C (97.9 °F), Max:37.1 °C (98.8 °F)   Temperature: 36.9 °C (98.4 °F)  Pulse  Av.7  Min: 64  Max: 131 Heart Rate (Monitored): 86  NIBP: 146/67 mmHg      Respiratory      Respiration: 16, Pulse Oximetry: 99 %     Work Of Breathing / Effort: Mild;Shallow  RUL Breath Sounds: Clear, RML Breath Sounds: Clear;Diminished, RLL Breath Sounds: Clear;Diminished, LESLEY Breath Sounds: Clear, LLL Breath Sounds: Clear;Diminished    Fluids    Intake/Output Summary (Last 24 hours) at 17 2213  Last data filed at 17 1800   Gross per 24 hour   Intake   1716 ml   Output    395 ml   Net   1321 ml       Nutrition  Orders Placed This Encounter   Procedures   • DIET ORDER     Standing Status: Standing      Number of Occurrences: 1      Standing Expiration Date:      Order Specific Question:  Diet:     Answer:  Low Fiber(GI Soft) [2]     Order Specific Question:  Texture/Fiber modifications:     Answer:  Dysphagia 3(Mechanical Soft)specify fluid consistency(question 6) [3]     Physical Exam   Constitutional: He appears well-developed. No distress. He is restrained.   overweight.    HENT:   Head: Normocephalic and atraumatic.   Nose: Nose normal.   Poor dentition   Eyes:  Conjunctivae and EOM are normal. Right eye exhibits no discharge. Left eye exhibits no discharge. No scleral icterus.   Neck: No tracheal deviation present.   Cardiovascular: Normal rate, regular rhythm, normal heart sounds and intact distal pulses.    No murmur heard.  Pulmonary/Chest: Effort normal and breath sounds normal. No respiratory distress. He has no wheezes.   Abdominal: Soft. Bowel sounds are normal. He exhibits no distension. There is no tenderness.   Musculoskeletal: He exhibits no edema.   Neurological: He is alert. He is disoriented. No cranial nerve deficit.   Skin: Skin is warm. He is not diaphoretic.   Psychiatric: His affect is inappropriate. His speech is delayed. He is slowed and actively hallucinating. Cognition and memory are impaired. He expresses inappropriate judgment.   Vitals reviewed.      Recent Labs      07/23/17   0410   WBC  4.3*   RBC  3.02*   HEMOGLOBIN  8.6*   HEMATOCRIT  27.0*   MCV  89.4   MCH  28.5   MCHC  31.9*   RDW  59.7*   PLATELETCT  50*   MPV  12.8     Recent Labs      07/23/17   0410   SODIUM  140   POTASSIUM  3.7   CHLORIDE  114*   CO2  24   GLUCOSE  92   BUN  8   CREATININE  0.57   CALCIUM  8.2*                      Assessment/Plan     * Hypovolemic shock (CMS-HCC)  Assessment & Plan  Resolved   S/p Massive transfusion  Monitor Vitals, I/O  Tolerating diet    Acute gastrointestinal hemorrhage  Assessment & Plan  Status post massive transfusion.  He had 7 variceals banded on EGD.  Octreotide drip and Protonix drip stopped July 18 by GI  Transfer out of ICU to medical floor.  s/p Rocephin  GI assisting/consultation  Monitor hemoglobin, CBC    ARF (acute renal failure) (CMS-HCC)  Assessment & Plan  Monitor BMP   Improved creatinine July 18th    Acute blood loss anemia  Assessment & Plan  S/p transfusions, follow hgb, INR  Transfuse for hemoglobin less than 7 or hematocrit less than 21    Cirrhosis (CMS-HCC) (present on admission)  Assessment & Plan  Downtrend in ammonia  level  Rifaximin and lactulose    Alcoholism (CMS-HCC)  Assessment & Plan  CIWA, vitamins  Monitor vitals  Restraints as needed  Wean librium  He will remain in the ICU for now  When necessary Ativan    Low serum magnesium level  Assessment & Plan  Monitor and replace as necessary    Pain of upper abdomen  Assessment & Plan  Normal lactic acid July 19.   Resolving  Ultrasound abdomen:Findings consistent with cirrhosis. Moderate ascites. Cholelithiasis. Limited evaluation of pancreas. Minimal RIGHT perinephric fluid, nonspecific.  Could be ongoing gastritis continue with proton pump inhibitor    Fever  Assessment & Plan  Antibiotic  Monitoring CBC. July 19 normal lactic acid recently normal urine analysis monitor vitals    Increased ammonia level  Assessment & Plan  Downtrending  Continue lactulose.  Barrier cream for protecting buttock area    Hypokalemia  Assessment & Plan  Improved      Labs reviewed and Medications reviewed  Allan catheter: No Allan

## 2017-07-25 NOTE — PROGRESS NOTES
Multiple attempts made to place peripheral IV for transfer to floor. Pt becomes agitated, combative and verbally aggressive. Okay to leave in central line per Dr. Simpson.

## 2017-07-25 NOTE — CARE PLAN
Problem: Venous Thromboembolism (VTW)/Deep Vein Thrombosis (DVT) Prevention:  Goal: Patient will participate in Venous Thrombosis (VTE)/Deep Vein Thrombosis (DVT)Prevention Measures  VTE prophylaxis is contraindicated. SCDs in place.     Problem: Psychosocial Needs:  Goal: Level of anxiety will decrease  Pt medicated for anxiety, agitation and hallucinations. Decreased stimuli includes low lights, therapeutic music and limited interruptions.

## 2017-07-25 NOTE — CARE PLAN
Problem: Communication  Goal: The ability to communicate needs accurately and effectively will improve  Outcome: PROGRESSING SLOWER THAN EXPECTED  WILL FIND A MORE EFFECTIVE WAY TO COMMUNICATE NEEDS TO STAFF    Problem: Safety  Goal: Will remain free from injury  Outcome: PROGRESSING AS EXPECTED  SAFETY MEASURES IN PLACE. WILL MONITOR AND ASSESS FREQUENTLY

## 2017-07-25 NOTE — PROGRESS NOTES
PT CHOSE CELEXA, ABILIFY AND TRAZADONE  AS MEDS THAT WORKED BEST FOR HIM OUT OF LIST OF PREVIOUS MEDS. PT UNABLE TO NAME MEDS ONLY CHOOSE FROM LIST

## 2017-07-25 NOTE — PROGRESS NOTES
PT HAS BEEN ON A VARIETY OF PSYCHIATRIC MEDS IN THE PAST IN DIFFERENT COMBINATIONS MOST RECENTLY ABILIFY 10MG PO Q DAY, CELEXA 20 MG Q DAY AND TRAZADONE 100MG PO Q HS.   HISTORICALLY THE PATIENT HAS BEEN ON TRAZADONE 600 MG Q HS WITH SEROQUEL 400MG PO BID. ANOTHER TRIAL WAS HALDOL 5MG PO BID WITH RISPERDAL 1MG IN AM AND 2MG Q HS. AT A LATER DATE PROZAC WAS ADDED TO THIS TRIAL. PER PT'S MOTHER THE MEDS WORKED WHEN HE WOULD TAKE THEM. COMPLIANCE SEEMS TO BE THE MAIN ISSUE.

## 2017-07-25 NOTE — CARE PLAN
Problem: Safety  Goal: Will remain free from falls  Outcome: PROGRESSING AS EXPECTED  Educated patient on fall precautions and on use of call light.    Problem: Skin Integrity  Goal: Risk for impaired skin integrity will decrease  Outcome: PROGRESSING AS EXPECTED  Frequent turning/repositioning of patient.

## 2017-07-25 NOTE — PROGRESS NOTES
Pt found at beginning of shift with dried patti red blood around mouth and beard. Oral care provided and tongue assessed for bite wounds. Pt stated he did not know where the blood came from but later said he bit it yesterday. Pt is confused and hallucinating; combative at times. Haldol 5mg administered for agitation and anxiety. Wrist restraints in place. Pt now calm and sleeping.

## 2017-07-26 NOTE — PROGRESS NOTES
Cortrak Placement    Tube Team verified patient name and medical record number prior to tube placement.  Cortrak tube (43 inches, 10 [place]) placed at 85 cm in right nare.  Per Cortrak picture, tube appears to be in the stomach.  Nursing Instructions: Awaiting KUB to confirm placement before use for medications or feeding. Once placement confirmed, flush tube with 30 ml of water, and then remove and save stylet, in patient medication drawer.

## 2017-07-26 NOTE — PROGRESS NOTES
Paged Dr. Mcdaniel regarding Hbg 6.2, also notified Dr. Simpson, ordered 1 unit PRBC    Dr. Mcdaniel aware, continue to monitor for bleeding, no additional orders

## 2017-07-26 NOTE — PROGRESS NOTES
Patient returned from OR at 1600, intubated and sedated, medellin ordered and inserted, cortrak was removed in OR

## 2017-07-26 NOTE — DIETARY
"Nutrition Support (cortrak) Assessment - Male    Dane Lincoln is a 57 y.o. male with admitting DX of Acute gastrointestinal hemorrhage, Hypovolemic shock, ARF (acute renal failure)   Past Medical History   Diagnosis Date   • Hepatitis C    • COPD (chronic obstructive pulmonary disease) (CMS-HCC) 2010   • Liver cirrhosis (CMS-HCC)    • Hx of ascites    • Psychiatric disorder      schizophrenic   • Depression    • Alcoholism (CMS-HCC)      Past Surgical History   Procedure Laterality Date   • Gastroscopy-endo  2017     Procedure: GASTROSCOPY-ENDO;  Surgeon: Jackson Moss M.D.;  Location: ENDOSCOPY [facility];  Service:      Allergies:  Peanut (diagnostic)  Height: 172.7 cm (5' 7.99\")  Weight: 103.2 kg (227 lb 8.2 oz)  Weight to Use in Calculations: 97.3 kg (214 lb 8.1 oz), per bed scale ; likely to be more accurate to actual wt as per I/Os pt is +13L since admit   Ideal Body Weight: 69.854 kg (154 lb)  Percent Ideal Body Weight: 139.3  BMI using estimated dry weight: 32.6 - obese, class I      Pertinent Labs: Gluc 87, BUN 14, creat 0.69, NH3 80  Pertinent Medications: Neurontin, Lactulose, Octreotide, Zofran (prn), Inderal, Xifaxan  Pertinent Fluids: no IVF at this time   Skin: per wound flowsheet, pt noted with skin tear to left heel   Last BM: 17    Estimated Needs: MSJ x 1 = 1785 kcals   Total Calories / day: 1555 - 1945  (Calories / k - 20)  Total Grams Protein / day: 97.5 - 115  (Grams Protein / k - 1.2)  Total Fluids ml / day: 2436.8 ml         Assessment / Evaluation: Pt is on day 9 of admit.  He is very lethargic and currently NPO at this time.  Pt is s/p banding for esophageal varices, stage IV. Pt was receiving a a po diet of dysphagia 3, low fiber; GI soft diet with variable po intake.  Per multidisciplinary rounds this morning, pt is to have a cortrak placed and nutrition support is to be initiated d/t pt's lethargy and variable po intake. Will provide specialized TF " formula, Replete with Fiber, to provide adequate kcals and protein.     Plan / Recommendation:  · Once cortrak is placed and verified and nutrition support is to be initiated, start Replete with Fiber at 25 mL/hr and advance per protocol to goal rate of 70 mL/hr.  TF at goal will provide 1680 kcals, 105 gm protein, 1410 mL free water per day, 23 gm fiber per day.   · Fluids per MD   · Advance to po diet as pt is able   · Monitor wt and lab trends   · RD to monitor TF tolerance and adjust as necessary     RD following

## 2017-07-26 NOTE — CONSULTS
"Critical Care/Pulmonary Consultation    Date of service: 7/26/2017    Consulting Physician: Doron Martinez D.O.    History of Present Illness: Dane Lincoln is a 57 y.o. male who is an alcoholic with known cirrhosis. He presented to the ED on 7/17/2017 with hematemesis and hemorrhagic shock. He was taken urgently for upper endoscopy where esophageal varices were banded. He was monitored closely in the intensive care unit. Today he's had a significant drop in his hemoglobin and an episode of hematochezia ×2 last night. Dr. Mcdaniel took him to the OR for repeat endoscopy where he was found to have ulcers from prior banding and further esophageal varices were banded. He was brought back from the operating room on full mechanical ventilatory support. Discussed case in detail with the anesthesiologist. Patient was deeply obtunded preprocedure. He felt it best to continue full mechanical ventilatory support. I was called urgently to the bedside to assist with the provision of ongoing ventilator and critical care support.    No current facility-administered medications on file prior to encounter.     No current outpatient prescriptions on file prior to encounter.       Social History   Substance Use Topics   • Smoking status: Current Every Day Smoker -- 1.00 packs/day for 35 years     Types: Cigarettes   • Smokeless tobacco: Never Used   • Alcohol Use: Yes      Comment: daily 3-4 \"earthquakes\"        Past Medical History   Diagnosis Date   • Hepatitis C    • COPD (chronic obstructive pulmonary disease) (CMS-HCC) 2/1/2010   • Liver cirrhosis (CMS-HCC)    • Hx of ascites    • Psychiatric disorder      schizophrenic   • Depression    • Alcoholism (CMS-HCC)        Past Surgical History   Procedure Laterality Date   • Gastroscopy-endo  7/17/2017     Procedure: GASTROSCOPY-ENDO;  Surgeon: Jackson Moss M.D.;  Location: ENDOSCOPY [facility], [place];  Service:        Allergies: Peanut (diagnostic)    Family History   Problem " Relation Age of Onset   • Cancer Mother      leukemia   • GI Father        Filed Vitals:    07/18/17 0400 07/18/17 0500 07/18/17 0600 07/18/17 0700   Height:       Weight:       Weight % change since last entry.:       BP:       Pulse: 105 95 86 90   BMI (Calculated):       Resp: 21 26 25 20   Temp: 36.8 °C (98.3 °F)       07/18/17 0800 07/18/17 0900 07/18/17 1000 07/18/17 1100   Height:       Weight:       Weight % change since last entry.:       BP:       Pulse: 96 102 94 87   BMI (Calculated):       Resp: 16 22 19 20   Temp: 37.7 °C (99.9 °F)  37.1 °C (98.8 °F)     07/18/17 1200 07/18/17 1300 07/18/17 1400 07/18/17 1500   Height:       Weight:       Weight % change since last entry.:       BP:       Pulse: 88 85 102 99   BMI (Calculated):       Resp: 17 17 21 16   Temp: 37.2 °C (98.9 °F)  37 °C (98.6 °F)     07/18/17 1600 07/18/17 1700 07/18/17 1800 07/18/17 2000   Height:       Weight:       Weight % change since last entry.:       BP:       Pulse: 93 88 97    BMI (Calculated):       Resp: 17 17 25    Temp: 37.5 °C (99.5 °F)   37.1 °C (98.8 °F)    07/18/17 2200 07/19/17 0800 07/19/17 1000 07/19/17 1100   Height:       Weight:       Weight % change since last entry.:       BP:       Pulse: 92 90 102 102   BMI (Calculated):       Resp: 18 20 20    Temp:  37.2 °C (99 °F) 37.9 °C (100.3 °F)     07/19/17 1200 07/19/17 1400 07/19/17 1500 07/19/17 1600   Height:       Weight:       Weight % change since last entry.:       BP:       Pulse: 102 120 112 101   BMI (Calculated):       Resp:       Temp: 38.2 °C (100.8 °F) 38.1 °C (100.6 °F)  37.8 °C (100 °F)    07/19/17 1700 07/19/17 1800 07/19/17 1942 07/20/17 0000   Height:       Weight:       Weight % change since last entry.:       BP:       Pulse: 104 94 93 96   BMI (Calculated):       Resp:   22 20   Temp:  37.9 °C (100.3 °F) 37.6 °C (99.6 °F) 37.2 °C (98.9 °F)    07/20/17 0030 07/20/17 0100 07/20/17 0300 07/20/17 0412   Height:       Weight:   105.1 kg (231 lb 11.3  oz)    Weight % change since last entry.:   8.02 %    BP:       Pulse: 82 106  80   BMI (Calculated):       Resp:    22   Temp:    37.1 °C (98.8 °F)    07/20/17 0800 07/20/17 0900 07/20/17 1030 07/20/17 1230   Height:       Weight:       Weight % change since last entry.:       BP:       Pulse: 88 90 89 88   BMI (Calculated):       Resp:    29   Temp: 37 °C (98.6 °F)       07/20/17 1300 07/20/17 1330 07/20/17 1400 07/20/17 1430   Height:       Weight:       Weight % change since last entry.:       BP:       Pulse: 94 90 92    BMI (Calculated):       Resp: 39 22 27 48   Temp:        07/20/17 1500 07/20/17 1530 07/20/17 1600 07/20/17 1630   Height:       Weight:       Weight % change since last entry.:       BP:       Pulse:       BMI (Calculated):       Resp: 28 18 23 26   Temp:   37 °C (98.6 °F)     07/20/17 1700 07/20/17 1730 07/20/17 1800 07/20/17 1900   Height:       Weight:       Weight % change since last entry.:       BP:       Pulse: 84 83  86   BMI (Calculated):       Resp: 47 44 31 28   Temp:        07/20/17 2000 07/20/17 2100 07/20/17 2200 07/20/17 2300   Height:       Weight:       Weight % change since last entry.:       BP:       Pulse:  79 83 80   BMI (Calculated):       Resp: 23 21 36 28   Temp: 37.1 °C (98.7 °F)  36.8 °C (98.2 °F)     07/20/17 2323 07/21/17 0000 07/21/17 0023 07/21/17 0100   Height:       Weight:       Weight % change since last entry.:       BP:       Pulse: 78 81 76 83   BMI (Calculated):       Resp: 34 28 32 27   Temp:  36.7 °C (98 °F)      07/21/17 0110 07/21/17 0200 07/21/17 0210 07/21/17 0300   Height:       Weight:  104.6 kg (230 lb 9.6 oz)     Weight % change since last entry.:  -0.48 %     BP:       Pulse: 87 64 87 77   BMI (Calculated):       Resp: 24 69 23 18   Temp:  37.2 °C (98.9 °F)      07/21/17 0330 07/21/17 0400 07/21/17 0430 07/21/17 0433   Height:       Weight:       Weight % change since last entry.:       BP:       Pulse: 72 83 81 79   BMI (Calculated):        Resp: 18 17 10 24   Temp:  37 °C (98.6 °F)      07/21/17 0500 07/21/17 0530 07/21/17 0600 07/21/17 0630   Height:       Weight:       Weight % change since last entry.:       BP:       Pulse: 82 83 86 87   BMI (Calculated):       Resp: 22 22 21 46   Temp:   37 °C (98.6 °F)     07/21/17 0700 07/21/17 0730 07/21/17 0800 07/21/17 0830   Height:       Weight:       Weight % change since last entry.:       BP:       Pulse: 84 85 82 83   BMI (Calculated):       Resp: 22 42 22 22   Temp:   36.7 °C (98 °F)     07/21/17 0900 07/21/17 0930 07/21/17 1000 07/21/17 1030   Height:       Weight:       Weight % change since last entry.:       BP:       Pulse: 74 81 87 72   BMI (Calculated):       Resp: 16 25 21 16   Temp:        07/21/17 1100 07/21/17 1130 07/21/17 1200 07/21/17 1230   Height:       Weight:       Weight % change since last entry.:       BP:       Pulse: 79 80 83 80   BMI (Calculated):       Resp: 19 38 23 30   Temp:        07/21/17 1300 07/21/17 1330 07/21/17 1400 07/21/17 1430   Height:       Weight:       Weight % change since last entry.:       BP:       Pulse: 79 78 71 84   BMI (Calculated):       Resp: 26 16 15 51   Temp: 36.7 °C (98 °F)       07/21/17 1500 07/21/17 1530 07/21/17 1600 07/21/17 1630   Height:       Weight:       Weight % change since last entry.:       BP:       Pulse: 81 77 76 89   BMI (Calculated):       Resp: 19 19 27 42   Temp:        07/21/17 1700 07/21/17 1730 07/21/17 1800 07/21/17 1805   Height:       Weight:       Weight % change since last entry.:       BP:       Pulse: 71 77 85 82   BMI (Calculated):       Resp: 14 15 25 25   Temp:        07/21/17 1900 07/21/17 2000 07/21/17 2100 07/21/17 2200   Height:       Weight:       Weight % change since last entry.:       BP:       Pulse: 78 79 79 76   BMI (Calculated):       Resp: 23 16 17 20   Temp:  36.8 °C (98.2 °F)  36.7 °C (98 °F)    07/21/17 2216 07/21/17 2300 07/22/17 0000 07/22/17 0025   Height:       Weight:       Weight % change  since last entry.:       BP:       Pulse: 82 78 82 77   BMI (Calculated):       Resp: 24 40 26 16   Temp:   36.6 °C (97.9 °F)     07/22/17 0100 07/22/17 0129 07/22/17 0200 07/22/17 0237   Height:       Weight:       Weight % change since last entry.:       BP:       Pulse: 71 72 69 80   BMI (Calculated):       Resp: 25 33 21 19   Temp:   36.6 °C (97.9 °F)     07/22/17 0300 07/22/17 0400 07/22/17 0500 07/22/17 0600   Height:       Weight:  101.7 kg (224 lb 3.3 oz)     Weight % change since last entry.:  -2.77 %     BP:       Pulse: 79 82 79 83   BMI (Calculated):       Resp: 24 26 20 18   Temp:  36.6 °C (97.8 °F)  36.6 °C (97.8 °F)    07/22/17 0617 07/22/17 0700 07/22/17 0800 07/22/17 0900   Height:       Weight:       Weight % change since last entry.:       BP:       Pulse: 79 72 79 81   BMI (Calculated):       Resp: 16 16 32 30   Temp:   36.8 °C (98.2 °F)     07/22/17 1000 07/22/17 1100 07/22/17 1200 07/22/17 1300   Height:       Weight:       Weight % change since last entry.:       BP:       Pulse: 80 73 77 68   BMI (Calculated):       Resp: 16 41 25 31   Temp: 36.8 °C (98.2 °F)  36.8 °C (98.3 °F)     07/22/17 1400 07/22/17 1500 07/22/17 1600 07/22/17 1700   Height:       Weight:       Weight % change since last entry.:       BP:       Pulse: 77 73 73 77   BMI (Calculated):       Resp: 35 14 14 17   Temp: 36.8 °C (98.2 °F)  36.4 °C (97.6 °F)     07/22/17 1900 07/22/17 2000 07/22/17 2105 07/22/17 2200   Height:       Weight:       Weight % change since last entry.:       BP:       Pulse: 65 66 69 70   BMI (Calculated):       Resp: 14 16 21 20   Temp:  36.3 °C (97.4 °F)      07/22/17 2230 07/22/17 2300 07/23/17 0000 07/23/17 0100   Height:       Weight:       Weight % change since last entry.:       BP:       Pulse: 82 82 71 80   BMI (Calculated):       Resp: 23 21 14 18   Temp:   36.7 °C (98 °F)     07/23/17 0130 07/23/17 0200 07/23/17 0300 07/23/17 0345   Height:       Weight:       Weight % change since last  entry.:       BP:       Pulse: 68 66 66 64   BMI (Calculated):       Resp: 18 15 20 31   Temp:        07/23/17 0400 07/23/17 0500 07/23/17 0600 07/23/17 0700   Height:       Weight:       Weight % change since last entry.:       BP:       Pulse: 69 70 67 70   BMI (Calculated):       Resp: 20 18 19 19   Temp: 36.6 °C (97.8 °F)       07/23/17 0800 07/23/17 0900 07/23/17 1000 07/23/17 1200   Height:       Weight:       Weight % change since last entry.:       BP:       Pulse: 68 76 84 65   BMI (Calculated):       Resp: 16 17 18 14   Temp: 36.8 °C (98.3 °F)  36.9 °C (98.5 °F) 36.9 °C (98.4 °F)    07/23/17 1300 07/23/17 1400 07/23/17 1500 07/23/17 1600   Height:       Weight:       Weight % change since last entry.:       BP:       Pulse: 69 78 88 83   BMI (Calculated):       Resp: 14 14 21 22   Temp:  36.7 °C (98 °F)  36.7 °C (98 °F)    07/23/17 1700 07/23/17 1800 07/23/17 1900 07/23/17 2000   Height:       Weight:       Weight % change since last entry.:       BP:       Pulse: 88 74 74 74   BMI (Calculated):       Resp: 17 16 15 14   Temp:  36.7 °C (98.1 °F)  36.8 °C (98.2 °F)    07/23/17 2100 07/23/17 2200 07/23/17 2300 07/24/17 0000   Height:       Weight:       Weight % change since last entry.:       BP:       Pulse: 69 83 87 71   BMI (Calculated):       Resp: 13 21 20 13   Temp:    36.7 °C (98 °F)    07/24/17 0100 07/24/17 0200 07/24/17 0300 07/24/17 0400   Height:       Weight:       Weight % change since last entry.:       BP:       Pulse: 67 77 80 89   BMI (Calculated):       Resp: 17 17 17 20   Temp:    36.6 °C (97.9 °F)    07/24/17 0500 07/24/17 0600 07/24/17 0700 07/24/17 0800   Height:       Weight:  103.2 kg (227 lb 8.2 oz)     Weight % change since last entry.:  1.47 %     BP:       Pulse: 74 64 74 82   BMI (Calculated):       Resp: 22 21 15 31   Temp:    37.1 °C (98.8 °F)    07/24/17 0900 07/24/17 1000 07/24/17 1100 07/24/17 1200   Height:       Weight:       Weight % change since last entry.:       BP:        Pulse: 90 84 79 89   BMI (Calculated):       Resp: 28 21 34 17   Temp:    36.9 °C (98.5 °F)    07/24/17 1300 07/24/17 1400 07/24/17 1500 07/24/17 1600   Height:       Weight:       Weight % change since last entry.:       BP:       Pulse: 78 86 74 87   BMI (Calculated):       Resp: 15 32 16 30   Temp:    36.9 °C (98.4 °F)    07/24/17 1700 07/24/17 1800 07/24/17 1900 07/24/17 2000   Height:       Weight:       Weight % change since last entry.:       BP:       Pulse: 73 85 87    BMI (Calculated):       Resp: 13 26 14 20   Temp:        07/24/17 2100 07/24/17 2200 07/24/17 2300 07/25/17 0000   Height:       Weight:       Weight % change since last entry.:       BP:       Pulse: 76 88 74 91   BMI (Calculated):       Resp: 14 16 15 20   Temp:    36.8 °C (98.3 °F)    07/25/17 0100 07/25/17 0200 07/25/17 0300 07/25/17 0400   Height:       Weight:       Weight % change since last entry.:       BP:       Pulse: 74 92 95 90   BMI (Calculated):       Resp: 21 17 18 18   Temp:    36.4 °C (97.5 °F)    07/25/17 0500 07/25/17 0600 07/25/17 0700 07/25/17 0800   Height:       Weight:       Weight % change since last entry.:       BP:       Pulse: 71 93 91 101   BMI (Calculated):       Resp: 16 25 21 18   Temp:    37.2 °C (99 °F)    07/25/17 0900 07/25/17 1000 07/25/17 1100 07/25/17 1200   Height:       Weight:       Weight % change since last entry.:       BP:       Pulse: 92 92 92 77   BMI (Calculated):       Resp: 16 16 32 14   Temp:  37 °C (98.6 °F)  37 °C (98.6 °F)    07/25/17 1300 07/25/17 1400 07/25/17 1500 07/25/17 1600   Height:       Weight:       Weight % change since last entry.:       BP:       Pulse: 78 72 89 89   BMI (Calculated):       Resp: 16 15 20 16   Temp:  36.7 °C (98.1 °F)  36.8 °C (98.3 °F)    07/25/17 1700 07/25/17 1800 07/25/17 1900 07/25/17 2000   Height:       Weight:       Weight % change since last entry.:       BP:       Pulse: 84 80 86 81   BMI (Calculated):       Resp: 16 17 18 15   Temp: 36.8 °C  (98.2 °F) 36.7 °C (98.1 °F)  36.2 °C (97.2 °F)    07/25/17 2100 07/25/17 2200 07/25/17 2300 07/26/17 0000   Height:       Weight:       Weight % change since last entry.:       BP:       Pulse: 87 86 78 82   BMI (Calculated):       Resp: 19 20 14 13   Temp:    36.3 °C (97.3 °F)    07/26/17 0100 07/26/17 0200 07/26/17 0230 07/26/17 0300   Height:       Weight:       Weight % change since last entry.:       BP:       Pulse: 89 90 89 82   BMI (Calculated):       Resp: 15 18 18 14   Temp:        07/26/17 0330 07/26/17 0400 07/26/17 0500 07/26/17 0600   Height:       Weight:       Weight % change since last entry.:       BP:       Pulse: 88 87 66 92   BMI (Calculated):       Resp: 19 15 18 15   Temp:  36.3 °C (97.3 °F)      07/26/17 0700 07/26/17 0800 07/26/17 0900 07/26/17 1000   Height:       Weight:       Weight % change since last entry.:       BP:       Pulse: 96 91 98 95   BMI (Calculated):       Resp: 24 17 17 19   Temp:  36.9 °C (98.4 °F)      07/26/17 1100 07/26/17 1200 07/26/17 1300 07/26/17 1330   Height:       Weight:       Weight % change since last entry.:       BP:    97/77   Pulse: 88 97 100 96   BMI (Calculated):       Resp: 14 17 24 15   Temp:    36.7 °C (98.1 °F)    07/26/17 1345 07/26/17 1400 07/26/17 1515 07/26/17 1610   Height:       Weight:       Weight % change since last entry.:       BP: 85/52  103/57    Pulse: 94 98 96 93   BMI (Calculated):       Resp: 15 15 15 22   Temp: 36.9 °C (98.4 °F)  36.9 °C (98.4 °F)    220 readings loaded. More data may exist, but no more data can be displayed here.       Physical Examination  General: Sedated on full ventilator support  Neuro/Psych: Sedated, unresponsive, weakly withdraws bilateral upper and lower extremities  HEENT: PERRL, trachea midline, ET tube in place  CVS: Sinus rhythm,  Respiratory: Moderately diminished throughout, few scattered coarse crackles at bases  Abdomen/: Distended but soft  Extremities: Trace to 1+ lower extremity edema  Skin:  Cool, dry, no rash    Recent Labs      07/25/17   0530  07/26/17   0418   WBC  3.2*   --    ASTSGOT  36  33   ALTSGPT  35  28   ALKPHOSPHAT  85  75   TBILIRUBIN  1.9*  1.9*     Recent Labs      07/24/17   0320  07/25/17   0530  07/26/17   0418   SODIUM   --   142  145   POTASSIUM   --   3.6  4.4   CHLORIDE   --   114*  117*   CO2   --   24  24   BUN   --   8  14   CREATININE   --   0.59  0.69   MAGNESIUM  1.7   --   1.8   PHOSPHORUS  2.9   --    --    CALCIUM   --   8.1*  8.0*     Recent Labs      07/25/17   0530  07/26/17   0418   ALTSGPT  35  28   ASTSGOT  36  33   ALKPHOSPHAT  85  75   TBILIRUBIN  1.9*  1.9*   GLUCOSE  101*  87           Invalid input(s): [id number]  DX-ABDOMEN FOR TUBE PLACEMENT   Final Result      Enteric tube tip projects over the first portion of the duodenum      US-LIVER AND BILIARY TREE   Final Result      1.  Findings consistent with cirrhosis.   2.  Moderate ascites.   3.  Cholelithiasis.   4.  Limited evaluation of pancreas.   5.  Minimal RIGHT perinephric fluid, nonspecific.      [id number] VIEW   Final Result      1.  There is a nonobstructive nonspecific bowel gas pattern.      DX-CHEST-LIMITED (1 VIEW)   Final Result         1.  Ill-defined opacifications in each lung have increased to a mild degree compared to the prior radiograph. This could indicate worsening of pulmonary edema or inflammation.      2.  Left subclavian central line noted with tip at the level of the SVC.      DX-CHEST-PORTABLE (1 VIEW)    (Results Pending)       Assessment and Plan:   Acute hypoxemic respiratory failure   - Continue full mechanical ventilatory support,   - I'm adjusted the tidal volume and minute ventilation. We'll continue to provide low tidal volume lung protective ventilation   - He is not appropriate for spontaneous breathing trial this time.  Acute upper gastrointestinal bleed secondary to esophageal varices   - Status post multiple esophageal variceal banding procedure 7/17/2017   -  Repeat banding procedure today 7/26/17   - Currently continue on Nexium and octreotide infusions   - Cortright was removed intraoperatively. I discussed with gastroenterology and I will replace a core track enteral feeding tube for medications including lactulose and rifaximin.  Acute blood loss anemia   - Monitor and transfuse only if needed maintaining a restrictive transfusion threshold unless active bleeding recurs   - Coagulopathy is being corrected  Encephalopathy   - Suspect portosystemic etiology   - Lactulose and rifaximin   -      The patient remains critically ill.  Critical care time = 35 minutes in directly providing and coordinating critical care and extensive data review.  No time overlap and excludes procedures.

## 2017-07-26 NOTE — PROGRESS NOTES
Dr. Mcdaniel at bedside to assess patient, will hold off on scope for now unless patient rebleeds. Notify Dr. Mcdaniel of any bleeding or significant change in status. Dr. Mcdaniel phone number [phone]

## 2017-07-26 NOTE — PROGRESS NOTES
[facility]    Date of Service: 2017    Chief Complaint  57 y.o. male admitted 2017 with hematemesis.    Interval Problem Update  Had EGD with esophageal varices, grade IV, now s/p banding.  Pt has received blood transfusions.  Had significant bloody BM , hgb dropping.  Pt seen in ICU, ICU care given.  Discussed patient condition and plan with RN, RT and charge nurse / hot rounds.      Consultants/Specialty  GI - Dr Terry    Disposition  Patient is critically ill.   The patient is having: GI bleed  The vital organ system that is effected is the: all are at risk  If untreated there is a high chance of deterioration into: shock and death   The critical care that I am providing today is: protonix/octreotide gtts  The critical care that has been undertaken is medically complex.   There has been no overlap in critical care time.  Critical care time not including procedures, no overlap: 44 minutes        Review of Systems   Unable to perform ROS: mental acuity      Physical Exam  Laboratory/Imaging   Hemodynamics  Temp (24hrs), Av.6 °C (97.9 °F), Min:36.2 °C (97.2 °F), Max:37 °C (98.6 °F)   Temperature: 36.9 °C (98.4 °F)  Pulse  Av.8  Min: 64  Max: 131 Heart Rate (Monitored): 96  NIBP: (!) 98/60 mmHg      Respiratory      Respiration: 17, Pulse Oximetry: 97 %     Work Of Breathing / Effort: Mild  RUL Breath Sounds: Clear, RML Breath Sounds: Clear;Diminished, RLL Breath Sounds: Clear;Diminished, LESLEY Breath Sounds: Clear, LLL Breath Sounds: Clear;Diminished    Fluids    Intake/Output Summary (Last 24 hours) at 17 1000  Last data filed at 17 0800   Gross per 24 hour   Intake    961 ml   Output    160 ml   Net    801 ml       Nutrition  Orders Placed This Encounter   Procedures   • DIET NPO     Standing Status: Standing      Number of Occurrences: 1      Standing Expiration Date:      Order Specific Question:  Restrict to:     Answer:  Strict [1]     Physical Exam   HENT:    Head: Normocephalic and atraumatic.   Mouth/Throat: No oropharyngeal exudate.   Eyes: Right eye exhibits no discharge. Left eye exhibits no discharge. No scleral icterus.   Neck: Neck supple. No tracheal deviation present.   Cardiovascular: Normal rate and regular rhythm.    No murmur heard.  Pulmonary/Chest: Effort normal and breath sounds normal. No stridor. No respiratory distress. He has no wheezes.   Abdominal: Soft. Bowel sounds are normal.   Musculoskeletal: He exhibits no edema.   Neurological:   Somnolent and confused    Skin: Skin is warm and dry. He is not diaphoretic. No erythema.   Psychiatric: He is noncommunicative.   Nursing note and vitals reviewed.      Recent Labs      07/25/17   0530  07/25/17   1658  07/25/17   2214  07/26/17   0418   WBC  3.2*   --    --    --    RBC  3.00*   --    --    --    HEMOGLOBIN  8.5*  7.8*  7.9*  7.1*   HEMATOCRIT  26.8*   --    --    --    MCV  89.3   --    --    --    MCH  28.3   --    --    --    MCHC  31.7*   --    --    --    RDW  59.1*   --    --    --    PLATELETCT  51*   --    --    --    MPV  12.8   --    --    --      Recent Labs      07/25/17   0530  07/26/17   0418   SODIUM  142  145   POTASSIUM  3.6  4.4   CHLORIDE  114*  117*   CO2  24  24   GLUCOSE  101*  87   BUN  8  14   CREATININE  0.59  0.69   CALCIUM  8.1*  8.0*     Recent Labs      07/25/17   1708   INR  1.61*                  Assessment/Plan     * Hypovolemic shock (CMS-HCC)  Assessment & Plan  - resolved     Acute gastrointestinal hemorrhage  Assessment & Plan  - initially resolved with banding  - started bleeding again 7/25, discussed with Dr Mcdaniel who felt it was likely d/t ulcer seen during previous EGD  - continue protonix/octreotide gtts  - repeat EGD if pt continues to bleed    ARF (acute renal failure) (CMS-HCC)  Assessment & Plan  - resolved     Acute blood loss anemia  Assessment & Plan  - bleeding noted again 7/25, now seems to have stopped but hgb continuing to drop  - continue  protonix/octreotide gtts  - closely monitor BP    Cirrhosis (CMS-HCC) (present on admission)  Assessment & Plan  - with hepatic encephalopathy  - continue rifaximin and lactulose     Alcoholism (CMS-HCC)  Assessment & Plan  - continue ciwa and librium  - much less agitated  - somnolent currently     Low serum magnesium level  Assessment & Plan  - resolved     Pain of upper abdomen  Assessment & Plan  - unable to assess d/t AMS    Fever  Assessment & Plan  - resolved    Hypokalemia  Assessment & Plan  - resolved       Labs reviewed, Medications reviewed and Radiology images reviewed  Allan catheter: No Allan        DVT prophylaxis - mechanical: SCDs  Ulcer prophylaxis: Yes

## 2017-07-26 NOTE — PROGRESS NOTES
Gastroenterology Progress Note     Author: Dany Mcdaniel   Date & Time Created: 7/26/2017 2:06 PM    Interval History:  S/p banding 1 week ago  Developed hematochezia last night x 2 then nothing since  Drop in hemoglobin overnight requiring transfusion  Cortrak placed with fresh blood present     Review of Systems:  Review of Systems   Unable to perform ROS      Physical Exam:  Physical Exam   Constitutional: He appears well-developed.   HENT:   Head: Normocephalic and atraumatic.   Neck: Normal range of motion.   Cardiovascular: Normal rate and regular rhythm.    Pulmonary/Chest: Effort normal and breath sounds normal.   Abdominal: Soft. Bowel sounds are normal. He exhibits no distension. There is no tenderness.   Musculoskeletal: He exhibits no edema.   Neurological:   Open eyes, does not follow commands        Labs:        Invalid input(s): [id number]      Recent Labs      07/24/17   0320  07/25/17   0530  07/26/17   0418   SODIUM   --   142  145   POTASSIUM   --   3.6  4.4   CHLORIDE   --   114*  117*   CO2   --   24 24   BUN   --   8  14   CREATININE   --   0.59  0.69   MAGNESIUM  1.7   --   1.8   PHOSPHORUS  2.9   --    --    CALCIUM   --   8.1*  8.0*     Recent Labs      07/25/17   0530  07/26/17   0418   ALTSGPT  35  28   ASTSGOT  36  33   ALKPHOSPHAT  85  75   TBILIRUBIN  1.9*  1.9*   GLUCOSE  101*  87     Recent Labs      07/25/17   0530  07/25/17   1045   07/25/17   1708  07/25/17   2214  07/26/17   0418  07/26/17   1115   RBC  3.00*   --    --    --    --    --    --    HEMOGLOBIN  8.5*   --    < >   --   7.9*  7.1*  6.2*   HEMATOCRIT  26.8*   --    --    --    --    --    --    PLATELETCT  51*   --    --    --    --    --    --    PROTHROMBTM   --    --    --   19.6*   --    --    --    INR   --    --    --   1.61*   --    --    --    IRON   --   24*   --    --    --    --    --    TOTIRONBC   --   274   --    --    --    --    --     < > = values in this interval not displayed.     Recent Labs       17   0530  17   0418   WBC  3.2*   --    ASTSGOT  36  33   ALTSGPT  35  28   ALKPHOSPHAT  85  75   TBILIRUBIN  1.9*  1.9*     Hemodynamics:  Temp (24hrs), Av.6 °C (97.9 °F), Min:36.2 °C (97.2 °F), Max:36.9 °C (98.4 °F)  Temperature: 36.9 °C (98.4 °F)  Pulse  Av  Min: 64  Max: 131Heart Rate (Monitored): 97  Blood Pressure: (!) 85/52 mmHg, NIBP: 100/58 mmHg     Respiratory:    Respiration: 15, Pulse Oximetry: 97 %     Work Of Breathing / Effort: Mild  RUL Breath Sounds: Clear, RML Breath Sounds: Clear;Diminished, RLL Breath Sounds: Clear;Diminished, LESLEY Breath Sounds: Clear, LLL Breath Sounds: Clear;Diminished  Fluids:    Intake/Output Summary (Last 24 hours) at 17 1406  Last data filed at 17 1400   Gross per 24 hour   Intake   1071 ml   Output    150 ml   Net    921 ml        GI/Nutrition:  Orders Placed This Encounter   Procedures   • DIET NPO     Standing Status: Standing      Number of Occurrences: 1      Standing Expiration Date:      Order Specific Question:  Restrict to:     Answer:  Strict [1]     Medical Decision Making, by Problem:  Active Hospital Problems    Diagnosis   • *Hypovolemic shock (CMS-HCC) [R57.1]   • Acute gastrointestinal hemorrhage [K92.2]   • ARF (acute renal failure) (CMS-HCC) [N17.9]   • Acute blood loss anemia [D62]   • Pain of upper abdomen [R10.10]   • Fever [R50.9]   • Hypokalemia [E87.6]   • Low serum magnesium level [E83.42]   • Alcoholism (CMS-HCC) [F10.20]   • Cirrhosis (CMS-HCC) [K74.60]   Recurrent GI bleed from ongoing varices or esophageal ulcer from prior banding site  AMS-HE vs other  ESLD  Ascites    Plan:  -continue IV Nexium and octreotide  -EGD; unable to obtain consent from the patient, no contacts available so will proceed on emergent basis with two doctors agreeing   -vitamin K given recent antibiotic use  -if ongoing bleeding then FFP as well  -check AFP    Core Measures

## 2017-07-26 NOTE — PROGRESS NOTES
Asked to do another Med Rec because Mom told Nurse he does take medication.  Left msg for Mom to call back  [facility] I called the one on [address] (closest to home address) hasn't filled since 8-1-2016 they also did a area search and found nothing.

## 2017-07-26 NOTE — PROGRESS NOTES
Dr. Simpson notified of patti red blood output from cortrak tube, patient does not complain of nausea, no vomitting or stool. Dr. Simpson notified Dr. Mcdaniel and will plan for scope

## 2017-07-26 NOTE — CARE PLAN
Problem: Safety  Goal: Will remain free from injury  Outcome: PROGRESSING AS EXPECTED  SAFETY MEASURES IN PLACE. WILL MONITOR AND ASSESS NEEDS FREQUENTLY    Problem: Psychosocial Needs:  Goal: Level of anxiety will decrease  Outcome: PROGRESSING SLOWER THAN EXPECTED  WILL CONTINUE TO MONITOR AND ASSESS AS WELL AS MEDICATE AS NEEDED

## 2017-07-26 NOTE — CARE PLAN
Problem: Communication  Goal: The ability to communicate needs accurately and effectively will improve  Outcome: PROGRESSING SLOWER THAN EXPECTED    Problem: Safety  Goal: Will remain free from injury  Outcome: PROGRESSING AS EXPECTED    Problem: Infection  Goal: Will remain free from infection  Outcome: PROGRESSING AS EXPECTED    Problem: Venous Thromboembolism (VTW)/Deep Vein Thrombosis (DVT) Prevention:  Goal: Patient will participate in Venous Thrombosis (VTE)/Deep Vein Thrombosis (DVT)Prevention Measures  Outcome: PROGRESSING AS EXPECTED    Problem: Knowledge Deficit  Goal: Knowledge of disease process/condition, treatment plan, diagnostic tests, and medications will improve  Outcome: PROGRESSING SLOWER THAN EXPECTED    Problem: Pain Management  Goal: Pain level will decrease to patient’s comfort goal  Outcome: PROGRESSING AS EXPECTED    Problem: Psychosocial Needs:  Goal: Level of anxiety will decrease  Outcome: PROGRESSING SLOWER THAN EXPECTED    Problem: Respiratory:  Goal: Respiratory status will improve  Outcome: PROGRESSING AS EXPECTED

## 2017-07-26 NOTE — PROGRESS NOTES
Approximately 1630 - Large amount of patti blood found in stool. Dr. Simpson notified. New orders received and implemented. Transfer orders canceled.     1800 - Pt had another large, burgundy/bloody stool. Dr. Simpson called GI. If pt continues to bleed, overnight RN is to page on-call GI doctor.  Scope planned for am.

## 2017-07-26 NOTE — PROGRESS NOTES
Patient transported to pre-op at 1500 on cardiac monitor accompanied by myself and transport   Report given to pre-op RN and anesthesiologist

## 2017-07-26 NOTE — PROCEDURES
Procedure Note    Pre Procedure Diagnosis  1. UGI bleed  2. Cirrhosis  3. Esophageal variceal band ligation 7 days ago    Post Procedure Diagnosis  1. Three esophageal ulcers from prior banding  2. 1-2 + varices s/p banding x 3  3. Blood throughout stomach and duodenum     Sedation  GA    Anesthesiologist  Omaira    Procedure Detail  Anesthesia was induced. The patient was left in the supine position. The endoscope was inserted into the esophagus. The esophagus was clearly abnormal. Three esophageal ulcers were seen in the distal esophagus from prior banding. No active bleeding or evidence of recent bleeding was seen in the ulcer bed. A single band remained in place. Small esophageal varices were still present. No recent bleeding site could be identified. The scope was passed into the stomach. A large amount of blood was seen in the proximal stomach which greatly reduced visibility. No ulcer or mass was seen. Retroflexion showed the blood but was otherwise normal.  The scope was advanced into the duodenum. Blood was seen throughout the duodenum which reduced visibility. Washing was performed with little benefit. No ulcer or mass was seen. No active bleeding was seen. The scope was removed. A banding kit was applied. The scope was reinserted and three bands were placed on the additional varices. The procedure was terminated.     Plan  1. Keep npo  2. Continue Nexium and Octreotide  3. Transfuse as needed  4. Consider rectal lactulose for HE treatment while npo

## 2017-07-27 PROBLEM — J96.00 ACUTE RESPIRATORY FAILURE (HCC): Status: ACTIVE | Noted: 2017-01-01

## 2017-07-27 PROBLEM — R57.9 SHOCK (HCC): Status: ACTIVE | Noted: 2017-01-01

## 2017-07-27 NOTE — PROGRESS NOTES
Pulmonary Critical Care Progress Note        DOS:  7/27/2017    Chief Complaint: Respiratory failure    History of Present Illness: Dane Lincoln is a 57 y.o. male who is an alcoholic with known cirrhosis. He presented to the ED on 7/17/2017 with hematemesis and hemorrhagic shock. He was taken urgently for upper endoscopy where esophageal varices were banded. He was monitored closely in the intensive care unit. Today he's had a significant drop in his hemoglobin and an episode of hematochezia ×2 7/25. Dr. Mcdaniel took him to the OR for repeat endoscopy where he was found to have ulcers from prior banding and further esophageal varices were banded. He was brought back from the operating room on full mechanical ventilatory support. Discussed case in detail with the anesthesiologist. Patient was deeply obtunded preprocedure. He felt it best to continue full mechanical ventilatory support. I was called urgently to the bedside to assist with the provision of ongoing ventilator and critical care support.    ROS:  Respiratory: unable to perform due to the patient's inability to effectively communicate, Cardiac: unable to perform due to the patient's inability to effectively communicate, GI: unable to perform due to the patient's inability to effectively communicate.  All other systems negative.    Interval Events:  24 hour interval history reviewed    - full vent support, vent day 2, no SBT yet   - agitated, not following commands   - norepinephrine started last night    - ongoing bloody stools   - L SC TLC   - remains on PPI and octreotide gtt   - WBC up, not on abx   - consider changing to dex    PFSH:  No change.    Respiratory:  [place] Vent Mode: APVCMV, Rate (breaths/min): 16, Vt Target (mL): 420, PEEP/CPAP: 8, FiO2: 35, Static Compliance (ml / cm H2O): 162, Control VTE (exp VT): 436  Pulse Oximetry: 96 %  Chest Tube Drains:          Exam: diminished breath sounds moderate  ImagingAvailable data reviewed   Recent  Labs      07/26/17 2112  07/27/17   0507   ISTATAPH  7.381*  7.396*   ISTATAPCO2  36.5  35.0   ISTATAPO2  77  62*   ISTATATCO2  23  23   [id number]  95  92*   ISTATARTHCO3  21.6  21.5   ISTATARTBE  -3  -3   ISTATTEMP  see below  36.9 C   ISTATFIO2  40  35   ISTATSPEC  Arterial  Arterial   ISTATAPHTC   --   7.397*   [id number]   --   62*       HemoDynamics:  Pulse: 84, Heart Rate (Monitored): 83  Blood Pressure: 103/57 mmHg, NIBP: 100/56 mmHg       Exam: regular rate and rhythm  Imaging: Available data reviewed        Neuro:  GCS Total [place] Coma Score: 7       Exam: weakly w/d's bilat ext, not following Heavily sedated  Imaging: Available data reviewed    Fluids:  Intake/Output       07/25/17 0700 - 07/26/17 0659 07/26/17 0700 - 07/27/17 0659 07/27/17 0700 - 07/28/17 0659      0159-7880 1156-3712 Total 3086-3976 7791-5590 Total 3010-6167 0926-8345 Total       Intake    P.O.  100  -- 100  --  -- --  --  -- --    P.O. 100 -- 100 -- -- -- -- -- --    I.V.  340  591 931  534.3  578.9 1113.2  218.6  -- 218.6    Crystalloid Intake -- -- -- 200 -- 200 -- -- --    Propofol Volume -- -- -- 4.3 117.8 122.1 18.6 -- 18.6    Norepinephrine Volume -- -- -- -- 221.1 221.1 60 -- 60    IV Volume (NS) 40 351 391 90 -- 90 -- -- --    IV Piggyback Volume 300 -- 300 -- -- -- 100 -- 100    IV Piggyback Volume (Octreotide) -- 120 120 120 120 240 20 -- 20    IV Piggyback Volume (Nexium) -- 120 120 120 120 240 20 -- 20    Blood  --  -- --  350  -- 350  --  -- --    Volume (RELEASE RED BLOOD CELLS) -- -- -- 350 -- 350 -- -- --    Other  --  -- --  --  60 60  --  -- --    Medications (P.O./ Enteral Liquids) -- -- -- -- 60 60 -- -- --    Enteral  --  -- --  --  60 60  --  -- --    Free Water / Tube Flush -- -- -- -- 60 60 -- -- --    Total Intake  884.3 698.9 1583.2 218.6 -- 218.6       Output    Urine  210  150 360  250  450 700  --  -- --    Condom Catheter -- -- -- 0 -- 0 -- -- --    Number of Times Incontinent of Urine --  3 x 3 x -- -- -- -- -- --    Indwelling Cathether -- -- -- 250 450 700 -- -- --    Straight Catheter 210 -- 210 -- -- -- -- -- --    Void (ml) 0 150 150 -- -- -- -- -- --    Stool  --  -- --  --  -- --  --  -- --    Number of Times Stooled 3 x -- 3 x -- 2 x 2 x 1 x -- 1 x    Blood  --  -- --  150  -- 150  --  -- --    Est. Blood Loss (mL) -- -- -- 150 -- 150 -- -- --    Total Output 210 150 360 400 450 850 -- -- --       Net I/O     230 441 671 484.3 248.9 733.2 218.6 -- 218.6           Recent Labs      17   0530  17   0418  17   0442   SODIUM  142  145  144   POTASSIUM  3.6  4.4  4.8   CHLORIDE  114*  117*  117*   CO2  24  24  23   BUN  8  14  23*   CREATININE  0.59  0.69  0.86   MAGNESIUM   --   1.8  1.8   PHOSPHORUS   --    --   4.0   CALCIUM  8.1*  8.0*  8.1*       GI/Nutrition:  Exam: abdomen is soft and non-tender  Imaging: Available data reviewed  NPO  Liver Function  Recent Labs      17   0530  17   0418  17   0442   ALTSGPT  35  28  23   ASTSGOT  36  33  37   ALKPHOSPHAT  85  75  66   TBILIRUBIN  1.9*  1.9*  2.8*   GLUCOSE  101*  87  116*       Heme:  Recent Labs      17   0530  17   1045   17   1708   17   1115  17   1900  17   0442   RBC  3.00*   --    --    --    --    --    --   2.69*   HEMOGLOBIN  8.5*   --    < >   --    < >  6.2*  7.3*  7.4*   HEMATOCRIT  26.8*   --    --    --    --    --    --   23.5*   PLATELETCT  51*   --    --    --    --    --    --   111*   PROTHROMBTM   --    --    --   19.6*   --    --    --    --    INR   --    --    --   1.61*   --    --    --    --    IRON   --   24*   --    --    --    --    --    --    TOTIRONBC   --   274   --    --    --    --    --    --     < > = values in this interval not displayed.       Infectious Disease:  Temp  Av.8 °C (98.3 °F)  Min: 36.7 °C (98.1 °F)  Max: 37 °C (98.6 °F)  Micro: reviewed  Recent Labs      17   0530  17   0418  17   0442   WBC   3.2*   --   12.4*   ASTSGOT  36  33  37   ALTSGPT  35  28  23   ALKPHOSPHAT  85  75  66   TBILIRUBIN  1.9*  1.9*  2.8*     Current Facility-Administered Medications   Medication Dose Frequency Provider Last Rate Last Dose   • cefTRIAXone (ROCEPHIN) 2 g in  mL IVPB  2 g Q24HRS Dany Mcdaniel M.D. 200 mL/hr at 07/27/17 0759 2 g at 07/27/17 0759   • lactulose 20 GM/30ML solution 30 mL  30 mL TID Renan Simpson D.O.   30 mL at 07/27/17 0800   • Respiratory Care per Protocol   Continuous RT Norm Ledezma M.D.       • senna-docusate (PERICOLACE or SENOKOT S) 8.6-50 MG per tablet 2 Tab  2 Tab BID Norm Ledezma M.D.   2 Tab at 07/27/17 0759    And   • polyethylene glycol/lytes (MIRALAX) PACKET 1 Packet  1 Packet QDAY PRN Norm Ledezma M.D.        And   • magnesium hydroxide (MILK OF MAGNESIA) suspension 30 mL  30 mL QDAY PRN Norm Ledezma M.D.        And   • bisacodyl (DULCOLAX) suppository 10 mg  10 mg QDAY PRN Norm Ledezma M.D.       • chlorhexidine (PERIDEX) 0.12 % solution 15 mL  15 mL BID Norm Ledezma M.D.   15 mL at 07/27/17 0800   • lidocaine (XYLOCAINE) 1%  injection  1-2 mL Q30 MIN PRN Norm Ledezma M.D.       • MD ALERT...Adult ICU Electrolyte Replacement per Pharmacy Protocol   pharmacy to dose Norm Ledezma M.D.       • MD ALERT...Pharmacy to implement PROPOFOL CRITICAL CARE PROTOCOL 1 Each  1 Each PRN Norm Ledezma M.D.       • fentaNYL (SUBLIMAZE) injection 25 mcg  25 mcg Q HOUR PRN Norm Ledezma M.D.        Or   • fentaNYL (SUBLIMAZE) injection 50 mcg  50 mcg Q HOUR PRN Norm Ledezma M.D.        Or   • fentaNYL (SUBLIMAZE) injection 100 mcg  100 mcg Q HOUR PRN Norm Ledezma M.D.   100 mcg at 07/26/17 2200   • ipratropium-albuterol (DUONEB) nebulizer solution 3 mL  3 mL Q2HRS PRN (RT) Norm Ledezma M.D.       • ipratropium-albuterol (DUONEB) nebulizer solution 3 mL  3 mL Q4HRS (RT) Norm Ledezma M.D.   3 mL at 07/27/17 0658   • propofol (DIPRIVAN) injection  0-80  mcg/kg/min Continuous Pavel Marin PHARMD 9.3 mL/hr at 07/27/17 0151 15 mcg/kg/min at 07/27/17 0151   • norepinephrine (LEVOPHED) 8 mg in  mL Infusion  0-30 mcg/min Continuous LENY Rice.O. 28.1 mL/hr at 07/27/17 0809 15 mcg/min at 07/27/17 0809   • octreotide (SANDOSTATIN) 1,250 mcg in  mL Infusion  50 mcg/hr Continuous LENY Rice.O. 10 mL/hr at 07/26/17 1850 50 mcg/hr at 07/26/17 1850   • esomeprazole (NEXIUM) 80 mg in  mL infusion  8 mg/hr Continuous GLADIS RiceO. 10 mL/hr at 07/26/17 2337 8 mg/hr at 07/26/17 2337   • haloperidol lactate (HALDOL) injection 5 mg  5 mg Q6HRS PRN Zach Evans M.D.   5 mg at 07/26/17 2200   • rifaximin (XIFAXAN) tablet 550 mg  550 mg BID Silas Rivera M.D.   550 mg at 07/27/17 0800   • gabapentin (NEURONTIN) capsule 100 mg  100 mg TID LENY Denton.ODewayne   100 mg at 07/27/17 0800   • acetaminophen (TYLENOL) tablet 650 mg  650 mg Q4HRS PRN GLADIS DentonODewayne   650 mg at 07/19/17 1723   • sucralfate (CARAFATE) 1 GM/10ML suspension 1 g  1 g Q6HRS LENY Denton.O.   Stopped at 07/27/17 0600   • ondansetron (ZOFRAN) syringe/vial injection 4 mg  4 mg Q4HRS PRN Doron Allison M.D.   4 mg at 07/23/17 0833   • ondansetron (ZOFRAN ODT) dispertab 4 mg  4 mg Q4HRS PRN Doron Allison M.D.   4 mg at 07/23/17 0832   • promethazine (PHENERGAN) tablet 12.5-25 mg  12.5-25 mg Q4HRS PRN Doron Allison M.D.   25 mg at 07/19/17 1025   • promethazine (PHENERGAN) suppository 12.5-25 mg  12.5-25 mg Q4HRS PRN Doron Allison M.D.       • prochlorperazine (COMPAZINE) injection 5-10 mg  5-10 mg Q4HRS PRN Doron Allison M.D.   5 mg at 07/23/17 0832   • glucose 4 g chewable tablet 16 g  16 g Q15 MIN PRN Doron Allison M.D.        And   • dextrose 50% (D50W) injection 25 mL  25 mL Q15 MIN PRN Doron Allison M.D.       • lorazepam (ATIVAN) tablet 0.5 mg  0.5 mg Q4HRS PRN Doron Allison M.D.   0.5 mg at 07/19/17 2000   • lorazepam (ATIVAN) tablet 1 mg  1  mg Q4HRS PRN Doron Allison M.D.   1 mg at 07/20/17 2219    Or   • lorazepam (ATIVAN) injection 0.5 mg  0.5 mg Q4HRS PRN Doron Allison M.D.       • lorazepam (ATIVAN) tablet 2 mg  2 mg Q2HRS PRN Doron Allison M.D.   2 mg at 07/20/17 2022    Or   • lorazepam (ATIVAN) injection 1 mg  1 mg Q2HRS PRN Doron Allison M.D.   1 mg at 07/23/17 1246   • lorazepam (ATIVAN) tablet 3 mg  3 mg Q HOUR PRN Doron Allison M.D.   3 mg at 07/23/17 0031    Or   • lorazepam (ATIVAN) injection 1.5 mg  1.5 mg Q HOUR PRN Doron Allison M.D.   1.5 mg at 07/24/17 1800   • lorazepam (ATIVAN) tablet 4 mg  4 mg Q15 MIN PRN Doron Allison M.D.        Or   • lorazepam (ATIVAN) injection 2 mg  2 mg Q15 MIN PRN Doron Allison M.D.   2 mg at 07/24/17 2345     Last reviewed on 7/17/2017  5:24 PM by Marli Garcia    Quality  Measures:  Radiology images reviewed, Medications reviewed and Labs reviewed                  Assessment and Plan:    Acute hypoxemic respiratory failure              - Continue full mechanical ventilatory support, not appropriate for SBT today              - continue to provide low tidal volume lung protective ventilation              - I discussed in detail with respiratory therapist today. Ventilator adjustments have been made  Acute upper gastrointestinal bleed secondary to esophageal varices              - Status post multiple esophageal variceal banding procedure 7/17/2017              - Repeat banding procedure 7/26/17              - continue on Nexium and octreotide infusions              - Cortright was removed intraoperatively. I discussed with gastroenterology and I will replace a core track enteral feeding tube for medications including lactulose and rifaximin.  Acute blood loss anemia              - Monitor and transfuse only if needed maintaining a restrictive transfusion threshold unless active bleeding recurs              - Coagulopathy is being corrected  Encephalopathy              - Suspect  portosystemic etiology              - Lactulose and rifaximin  Hypotension/shock   - This may be a combination of interest to volume depletion and hemorrhage   - We'll continue to follow serial hemoglobin and replace as needed   - I have ordered a serum cortisol level   - I will provide IV fluid crystalloid bolus   - Lower suspicion for sepsis, however wbc's slightly increased and low threshold for initiation of antibiotics and culturing  Discussed patient condition and risk of morbidity and/or mortality with RN, RT and QA team.    The patient remains critically ill.  Critical care time = 32 minutes in directly providing and coordinating critical care and extensive data review.  No time overlap and excludes procedures.

## 2017-07-27 NOTE — CARE PLAN
Problem: Safety  Goal: Will remain free from injury  Outcome: PROGRESSING AS EXPECTED  SAFETY MEASURES IN PLACE. WILL MONITOR AND ASSESS FREQUENTLY    Problem: Respiratory:  Goal: Respiratory status will improve  Outcome: PROGRESSING AS EXPECTED  WILL CONTINUE PULMONARY TOILET

## 2017-07-27 NOTE — CARE PLAN
Problem: Ventilation Defect:  Goal: Ability to achieve and maintain unassisted ventilation or tolerate decreased levels of ventilator support  Outcome: PROGRESSING AS EXPECTED  Adult Ventilation Update    Total Vent Days: 2      Patient Lines/Drains/Airways Status    Active Airway      Name: Placement date: Placement time: Site: Days:     Airway Group ET Tube Oral 8.5 07/26/17  1500  Oral                     Plateau Pressure (Q Shift): 19 (07/27/17 0245)  Static Compliance (ml / cm H2O): 81 (07/27/17 1121)    Patient failed trials because of Barriers to Wean: Other (Comments) (held)  Barriers to SBT    Length of Weaning Trial      Sputum/Suction   Cough: Productive (07/27/17 1200)  Sputum Amount: Small (07/27/17 1121)  Sputum Color: White (07/27/17 1121)  Sputum Consistency: Thin (07/27/17 1121)    Events/Summary/Plan: No change. Will continue to monitor.

## 2017-07-27 NOTE — CARE PLAN
Problem: Infection  Goal: Will remain free from infection  Outcome: PROGRESSING AS EXPECTED    Problem: Bowel/Gastric:  Goal: Normal bowel function is maintained or improved  Outcome: PROGRESSING SLOWER THAN EXPECTED    Problem: Knowledge Deficit  Goal: Knowledge of disease process/condition, treatment plan, diagnostic tests, and medications will improve  Outcome: PROGRESSING SLOWER THAN EXPECTED    Problem: Fluid Volume:  Goal: Will maintain balanced intake and output  Outcome: PROGRESSING SLOWER THAN EXPECTED

## 2017-07-27 NOTE — PROGRESS NOTES
Spoke with Dr. Ferreira regarding need for NG tube to give meds, Dr. Ferreira spoke with Dr. Mcdaniel who said it was ok to place new cortrak

## 2017-07-27 NOTE — PROGRESS NOTES
[facility] Progress Note    Date of Service: 2017    Chief Complaint  57 y.o. male admitted 2017 with hematemesis.    Interval Problem Update  Had EGD with esophageal varices, grade IV, now s/p banding.  Pt has received blood transfusions.  Had significant bloody BM , hgb dropping.  Pt seen in ICU, ICU care given.  Discussed patient condition and plan with RN, RT and charge nurse / hot rounds.      Consultants/Specialty  GI - Dr Terry    Disposition  Patient is critically ill.   The patient is having: GI bleed, shock  The vital organ system that is effected is the: all are at risk  If untreated there is a high chance of deterioration into: shock and death   The critical care that I am providing today is: levophed/protonix/octreotide gtts  The critical care that has been undertaken is medically complex.   There has been no overlap in critical care time.  Critical care time not including procedures, no overlap: 42 minutes        Review of Systems   Unable to perform ROS: intubated      Physical Exam  Laboratory/Imaging   Hemodynamics  Temp (24hrs), Av.8 °C (98.3 °F), Min:36.7 °C (98.1 °F), Max:37 °C (98.6 °F)   Temperature: 37 °C (98.6 °F)  Pulse  Av.9  Min: 64  Max: 131 Heart Rate (Monitored): 83  Blood Pressure: 103/57 mmHg, NIBP: 100/56 mmHg      Respiratory  Cotton Vent Mode: APVCMV, Rate (breaths/min): 16, PEEP/CPAP: 8, PEEP/CPAP: 8, FiO2: 35, P Peak (PIP): 17, P MEAN: 10   Respiration: 16, Pulse Oximetry: 96 %     Work Of Breathing / Effort: Vented  RUL Breath Sounds: Clear, RML Breath Sounds: Diminished, RLL Breath Sounds: Diminished, LESLEY Breath Sounds: Clear, LLL Breath Sounds: Diminished    Fluids    Intake/Output Summary (Last 24 hours) at 17 0937  Last data filed at 17 0800   Gross per 24 hour   Intake 1751.77 ml   Output    850 ml   Net 901.77 ml       Nutrition  Orders Placed This Encounter   Procedures   • Diet NPO     Standing Status: Standing      Number of  Occurrences: 1      Standing Expiration Date:      Order Specific Question:  Type:     Answer:  Now [1]     Order Specific Question:  Restrict to:     Answer:  Strict [1]     Physical Exam   Constitutional: No distress. He is intubated.   HENT:   Head: Normocephalic and atraumatic.   Eyes: Right eye exhibits no discharge. Left eye exhibits no discharge.   Neck: Neck supple.   Cardiovascular: Normal rate and regular rhythm.  Exam reveals no gallop.    No murmur heard.  Pulmonary/Chest: No stridor. He is intubated. He has no wheezes. He has rales.   Abdominal: Soft. He exhibits distension. Bowel sounds are decreased.   Musculoskeletal: He exhibits no edema.   Neurological:   Intubated and sedated    Skin: Skin is warm and dry. He is not diaphoretic.   Psychiatric: He is noncommunicative.   Nursing note and vitals reviewed.      Recent Labs      07/25/17   0530   07/26/17   1115  07/26/17   1900  07/27/17   0442   WBC  3.2*   --    --    --   12.4*   RBC  3.00*   --    --    --   2.69*   HEMOGLOBIN  8.5*   < >  6.2*  7.3*  7.4*   HEMATOCRIT  26.8*   --    --    --   23.5*   MCV  89.3   --    --    --   87.4   MCH  28.3   --    --    --   27.5   MCHC  31.7*   --    --    --   31.5*   RDW  59.1*   --    --    --   66.2*   PLATELETCT  51*   --    --    --   111*   MPV  12.8   --    --    --   11.2    < > = values in this interval not displayed.     Recent Labs      07/25/17   0530  07/26/17   0418  07/27/17   0442   SODIUM  142  145  144   POTASSIUM  3.6  4.4  4.8   CHLORIDE  114*  117*  117*   CO2  24  24  23   GLUCOSE  101*  87  116*   BUN  8  14  23*   CREATININE  0.59  0.69  0.86   CALCIUM  8.1*  8.0*  8.1*     Recent Labs      07/25/17   1708   INR  1.61*         Recent Labs      07/26/17   1900   TRIGLYCERIDE  82          Assessment/Plan     * Hypovolemic shock (CMS-[facility])  Assessment & Plan  - worse, now on levophed  - pt was having significant bleeding with hematochezia from cortrak as well as melena  - now still  having melena    Acute gastrointestinal hemorrhage  Assessment & Plan  - initially resolved with banding  - started bleeding again 7/25, discussed with Dr Mcdaniel who felt it was likely d/t ulcer but no source noted on repeat EGD  - continue protonix/octreotide gtts    ARF (acute renal failure) (CMS-HCC)  Assessment & Plan  - resolved     Acute blood loss anemia  Assessment & Plan  - continues to bleed, source not identified on EGD  - continue protonix/octreotide gtts  - now hypotensive  - transfuse as needed    Cirrhosis (CMS-HCC) (present on admission)  Assessment & Plan  - with hepatic encephalopathy  - continue rifaximin and lactulose     Alcoholism (CMS-HCC)  Assessment & Plan  - now on propofol    Low serum magnesium level  Assessment & Plan  - resolved     Hypokalemia  Assessment & Plan  - resolved     Acute respiratory failure (CMS-HCC)  Assessment & Plan  - remained intubated post EGD  - full vent support  - vent management per critical care  - cxr read by me shows mild worsening in bilateral opacities      Labs reviewed, Medications reviewed and Radiology images reviewed  Allan catheter: No Allan        DVT prophylaxis - mechanical: SCDs  Ulcer prophylaxis: Yes

## 2017-07-27 NOTE — PROGRESS NOTES
Cortrak Placement    Tube Team verified patient name and medical record number prior to tube placement.  Cortrak tube (43 inches, 10 [place]) placed at 90 cm in LEFT nare.  Per Cortrak picture, tube appears to be in the STOMACH.  Nursing Instructions: Awaiting KUB to confirm placement before use for medications or feeding. Once placement confirmed, flush tube with 30 ml of water, and then remove and save stylet, in patient medication drawer.

## 2017-07-27 NOTE — PROGRESS NOTES
Gastroenterology Progress Note     Author: Dany Mcdaniel   Date & Time Created: 7/27/2017 7:01 AM    Interval History:  -remains intubated  -2 bloody stools overnight which may just be residual blood as his stomach was full of blood yesterday     Review of Systems:  Review of Systems   Unable to perform ROS      Physical Exam:  Physical Exam   Eyes: Pupils are equal, round, and reactive to light. No scleral icterus.   Neck: Normal range of motion.   Cardiovascular: Normal rate and regular rhythm.    Pulmonary/Chest: Effort normal and breath sounds normal.   Abdominal: Soft. Bowel sounds are normal. He exhibits distension. There is no tenderness.   Musculoskeletal: He exhibits edema.       Labs:  Recent Labs      07/26/17   2112  07/27/17   0507   ISTATAPH  7.381*  7.396*   ISTATAPCO2  36.5  35.0   ISTATAPO2  77  62*   ISTATATCO2  23  23   PGTOMJN2PHY  95  92*   ISTATARTHCO3  21.6  21.5   ISTATARTBE  -3  -3   ISTATTEMP  see below  36.9 C   ISTATFIO2  40  35   ISTATSPEC  Arterial  Arterial   ISTATAPHTC   --   7.397*   [id number]   --   62*         Recent Labs      07/25/17   0530  07/26/17   0418  07/27/17   0442   SODIUM  142  145  144   POTASSIUM  3.6  4.4  4.8   CHLORIDE  114*  117*  117*   CO2  24  24  23   BUN  8  14  23*   CREATININE  0.59  0.69  0.86   MAGNESIUM   --   1.8   --    CALCIUM  8.1*  8.0*  8.1*     Recent Labs      07/25/17   0530  07/26/17   0418  07/27/17   0442   ALTSGPT  35  28  23   ASTSGOT  36  33  37   ALKPHOSPHAT  85  75  66   TBILIRUBIN  1.9*  1.9*  2.8*   GLUCOSE  101*  87  116*     Recent Labs      07/25/17   0530  07/25/17   1045   07/25/17   1708   07/26/17   1115  07/26/17   1900  07/27/17   0442   RBC  3.00*   --    --    --    --    --    --   2.69*   HEMOGLOBIN  8.5*   --    < >   --    < >  6.2*  7.3*  7.4*   HEMATOCRIT  26.8*   --    --    --    --    --    --   23.5*   PLATELETCT  51*   --    --    --    --    --    --   111*   PROTHROMBTM   --    --    --   19.6*   --    --     --    --    INR   --    --    --   1.61*   --    --    --    --    IRON   --   24*   --    --    --    --    --    --    TOTIRONBC   --   274   --    --    --    --    --    --     < > = values in this interval not displayed.     Recent Labs      17   0530  17   0418  17   0442   WBC  3.2*   --   12.4*   ASTSGOT  36  33  37   ALTSGPT  35  28  23   ALKPHOSPHAT  85  75  66   TBILIRUBIN  1.9*  1.9*  2.8*     Hemodynamics:  Temp (24hrs), Av.8 °C (98.3 °F), Min:36.7 °C (98.1 °F), Max:36.9 °C (98.4 °F)  Temperature: 36.7 °C (98.1 °F)  Pulse  Av.9  Min: 64  Max: 131Heart Rate (Monitored): 81  Blood Pressure: 103/57 mmHg, NIBP: (!) 97/56 mmHg     Respiratory:  Cotton Vent Mode: APVCMV, Rate (breaths/min): 16, PEEP/CPAP: 8, FiO2: 35, P Peak (PIP): 18, P MEAN: 10 Respiration: (!) 21, Pulse Oximetry: 98 %     Work Of Breathing / Effort: Vented  RUL Breath Sounds: Crackles, RML Breath Sounds: Diminished, RLL Breath Sounds: Diminished, LESLEY Breath Sounds: Crackles, LLL Breath Sounds: Diminished  Fluids:    Intake/Output Summary (Last 24 hours) at 17 0701  Last data filed at 17 0600   Gross per 24 hour   Intake 1583.17 ml   Output    850 ml   Net 733.17 ml        GI/Nutrition:  Orders Placed This Encounter   Procedures   • Diet NPO     Standing Status: Standing      Number of Occurrences: 1      Standing Expiration Date:      Order Specific Question:  Type:     Answer:  Now [1]     Order Specific Question:  Restrict to:     Answer:  Strict [1]     Medical Decision Making, by Problem:  Active Hospital Problems    Diagnosis   • *Hypovolemic shock (CMS-HCC) [R57.1]-due to GI blood loss   • Acute gastrointestinal hemorrhage [K92.2]-likely from varices and not esophageal ulcer but the actual bleeding site could not be identified; 3 additional bands placed    • ARF (acute renal failure) (CMS-HCC) [N17.9]-resolved    • Acute blood loss anemia [D62]   • Pain of upper abdomen [R10.10]   • Fever  [R50.9]   • Hypokalemia [E87.6]   • Low serum magnesium level [E83.42]   • Alcoholism (CMS-HCC) [F10.20]   • Cirrhosis (CMS-HCC) [K74.60]   *AMS-likely from HE but baseline mentation is not known    Plan:  -continue lactulose and xifaxan  -watch for further bleeding; consider TIPS if ongoing bleeding but given his poor mentation at present, this would be a difficult decision  -PRBC as needed  -hold tube feeds today, resume tomorrow if no bleeding  -probably should resume antibiotics given re bleeding with liver disease  -stop propranolol while on octreotide, resume when octreotide is stopped as BP allows  -continue Carafate for another few days but then can d/c       Core Measures

## 2017-07-27 NOTE — CARE PLAN
Problem: Ventilation Defect:  Goal: Ability to achieve and maintain unassisted ventilation or tolerate decreased levels of ventilator support  Intervention: Support and monitor invasive and noninvasive mechanical ventilation  Adult Ventilation Update    Total Vent Days: 1      Patient Lines/Drains/Airways Status    Active Airway      Name: Placement date: Placement time: Site: Days:     Airway Group ET Tube Oral 8.5 07/26/17  1500  Oral  less than 1                                   Static Compliance (ml / cm H2O): 25 (07/26/17 1625)    Patient failed trials because of    Barriers to SBT    Length of Weaning Trial                         Cough: Moist;Productive (07/26/17 1625)  Sputum Amount: Small (07/26/17 1625)  Sputum Color: Clear;Pink Tinged;Tan;White (07/26/17 1625)  Sputum Consistency: Thin (07/26/17 1625)    Mobility Group  Activity Performed: Unable to mobilize (07/26/17 1600)  Time Activity Tolerated: 5 min (07/25/17 0800)  Distance Per Occurrence (ft.): 3 feet (07/20/17 0800)  # of Times Distance was Traveled: 2 (07/20/17 0800)  Assistance / Tolerance: Assistance of One (07/22/17 0800)  Pt Calls for Assistance: No (07/26/17 0800)  Staff Present for Mobilization: RN (07/22/17 0800)  Assistive Devices: Hand held assist (07/20/17 0800)  Reason Not Mobilized: Bed rest (07/26/17 1600)  Mobilization Comments: HALLUCINATING;CONFUSED (07/25/17 2000)    Events/Summary/Plan: Pt. just back from OR and placed on a vent w/o difficulty.  8.5 ETT secured via deejay.  Dr. Ledezma present. (07/26/17 1625)

## 2017-07-28 NOTE — CARE PLAN
Problem: Safety  Goal: Will remain free from injury  Outcome: PROGRESSING AS EXPECTED    Problem: Infection  Goal: Will remain free from infection  Outcome: PROGRESSING AS EXPECTED    Problem: Venous Thromboembolism (VTW)/Deep Vein Thrombosis (DVT) Prevention:  Goal: Patient will participate in Venous Thrombosis (VTE)/Deep Vein Thrombosis (DVT)Prevention Measures  Outcome: PROGRESSING AS EXPECTED    Problem: Bowel/Gastric:  Goal: Normal bowel function is maintained or improved  Outcome: PROGRESSING AS EXPECTED    Problem: Knowledge Deficit  Goal: Knowledge of disease process/condition, treatment plan, diagnostic tests, and medications will improve  Outcome: PROGRESSING SLOWER THAN EXPECTED    Problem: Fluid Volume:  Goal: Will maintain balanced intake and output  Outcome: PROGRESSING SLOWER THAN EXPECTED    Problem: Skin Integrity  Goal: Risk for impaired skin integrity will decrease  Outcome: PROGRESSING AS EXPECTED    Problem: Pain Management  Goal: Pain level will decrease to patient’s comfort goal  Outcome: PROGRESSING AS EXPECTED

## 2017-07-28 NOTE — DISCHARGE PLANNING
Received Consult from bedside RN for SW contact with the pt's mother, Tori at Tori's request. TC to Adriana's mobile [phone]. Left message explaining this was a non-emergency call and provided Tori with this SW's contact information and requesting she contact this SW back.

## 2017-07-28 NOTE — PROGRESS NOTES
[facility]    Date of Service: 2017    Chief Complaint  57 y.o. male admitted 2017 with hematemesis.    Interval Problem Update  Had EGD with esophageal varices, grade IV, now s/p banding.  Pt has received blood transfusions.  Had significant bloody BM , hgb dropping. Went for repeat EGD , intubated.  Pt seen in ICU, ICU care given.  Discussed patient condition and plan with RN, RT and charge nurse / hot rounds.      Consultants/Specialty  GI - Dr Terry    Disposition  Patient is critically ill.   The patient is having: GI bleed, shock  The vital organ system that is effected is the: all are at risk  If untreated there is a high chance of deterioration into: shock and death   The critical care that I am providing today is: levophed/protonix/octreotide gtts  The critical care that has been undertaken is medically complex.   There has been no overlap in critical care time.  Critical care time not including procedures, no overlap: 37 minutes        Review of Systems   Unable to perform ROS: intubated      Physical Exam  Laboratory/Imaging   Hemodynamics  Temp (24hrs), Av.6 °C (97.8 °F), Min:36.1 °C (97 °F), Max:37 °C (98.6 °F)   Temperature: 36.8 °C (98.3 °F)  Pulse  Av.3  Min: 64  Max: 131 Heart Rate (Monitored): (!) 127  NIBP: 147/87 mmHg      Respiratory  Cotton Vent Mode: APVCMV, Rate (breaths/min): 16, PEEP/CPAP: 8, PEEP/CPAP: 8, FiO2: 45, P Peak (PIP): 17, P MEAN: 10   Respiration: 20, Pulse Oximetry: 98 %     Work Of Breathing / Effort: Vented  RUL Breath Sounds: Rhonchi, RML Breath Sounds: Rhonchi, RLL Breath Sounds: Rhonchi, LESLEY Breath Sounds: Rhonchi, LLL Breath Sounds: Rhonchi    Fluids    Intake/Output Summary (Last 24 hours) at 17 0940  Last data filed at 17 0600   Gross per 24 hour   Intake 1441.07 ml   Output    420 ml   Net 1021.07 ml       Nutrition  Orders Placed This Encounter   Procedures   • Diet NPO     Standing Status: Standing       Number of Occurrences: 1      Standing Expiration Date:      Order Specific Question:  Type:     Answer:  Now [1]     Order Specific Question:  Restrict to:     Answer:  Strict [1]     Physical Exam   Constitutional: He is intubated.   HENT:   Head: Normocephalic and atraumatic.   Mouth/Throat: No oropharyngeal exudate.   Eyes: Right eye exhibits no discharge. Left eye exhibits no discharge. No scleral icterus.   Neck: Neck supple. No tracheal deviation present.   Cardiovascular: Normal rate and regular rhythm.    No murmur heard.  Pulmonary/Chest: No stridor. He is intubated. He has rales.   Abdominal: Soft. He exhibits distension. He exhibits no mass. Bowel sounds are decreased.   Musculoskeletal: He exhibits no edema.   Neurological:   Intubated and sedated    Skin: Skin is warm and dry. He is not diaphoretic. No erythema.   Psychiatric: He is noncommunicative.   Nursing note and vitals reviewed.      Recent Labs      07/27/17   0442  07/27/17   1530  07/28/17   0428   WBC  12.4*   --   8.4   RBC  2.69*   --   2.51*   HEMOGLOBIN  7.4*  7.5*  7.1*   HEMATOCRIT  23.5*   --   22.5*   MCV  87.4   --   89.6   MCH  27.5   --   28.3   MCHC  31.5*   --   31.6*   RDW  66.2*   --   67.7*   PLATELETCT  111*   --   74*   MPV  11.2   --   11.8     Recent Labs      07/26/17   0418  07/27/17   0442  07/28/17   0428   SODIUM  145  144  143   POTASSIUM  4.4  4.8  4.2   CHLORIDE  117*  117*  117*   CO2  24  23  22   GLUCOSE  87  116*  125*   BUN  14  23*  25*   CREATININE  0.69  0.86  0.84   CALCIUM  8.0*  8.1*  8.2*     Recent Labs      07/25/17   1708  07/28/17   0428   INR  1.61*  1.55*         Recent Labs      07/26/17   1900  07/28/17   0428   TRIGLYCERIDE  82  137          Assessment/Plan     * Hypovolemic shock (CMS-HCC)  Assessment & Plan  - worse, now on levophed  - pt was having significant bleeding with hematochezia from cortrak as well as melena  - now still having melena    Acute gastrointestinal  hemorrhage  Assessment & Plan  - initially resolved with banding  - started bleeding again 7/25, discussed with Dr Mcdaniel who felt it was likely d/t ulcer but no source noted on repeat EGD  - continue protonix/octreotide gtts    ARF (acute renal failure) (CMS-HCC)  Assessment & Plan  - resolved     Acute blood loss anemia  Assessment & Plan  - hgb continues to drop, more slowly now, source not identified on EGD but significant blood noted  - continue protonix/octreotide gtts  - now hypotensive  - transfuse as needed  - will need iron when more stable    Cirrhosis (CMS-HCC) (present on admission)  Assessment & Plan  - with hepatic encephalopathy  - continue rifaximin and lactulose     Alcoholism (CMS-[facility])  Assessment & Plan  - now on propofol    Low serum magnesium level  Assessment & Plan  - resolved     Hypokalemia  Assessment & Plan  - resolved     Acute respiratory failure (CMS-[facility])  Assessment & Plan  - remained intubated post EGD  - full vent support  - vent management per critical care  - cxr image and report reviewed by me, read by me shows worse right base opacity  - lasix when BP improves      Labs reviewed, Medications reviewed and Radiology images reviewed  Allan catheter: No Allan        DVT prophylaxis - mechanical: SCDs  Ulcer prophylaxis: Yes

## 2017-07-28 NOTE — PROGRESS NOTES
Pulmonary Critical Care Progress Note        DOS:  7/28/2017    Chief Complaint: Respiratory failure    History of Present Illness: Dane Lincoln is a 57 y.o. male who is an alcoholic with known cirrhosis. He presented to the ED on 7/17/2017 with hematemesis and hemorrhagic shock. He was taken urgently for upper endoscopy where esophageal varices were banded. He was monitored closely in the intensive care unit. Today he's had a significant drop in his hemoglobin and an episode of hematochezia ×2 7/25. Dr. Mcdaniel took him to the OR for repeat endoscopy where he was found to have ulcers from prior banding and further esophageal varices were banded. He was brought back from the operating room on full mechanical ventilatory support. Discussed case in detail with the anesthesiologist. Patient was deeply obtunded preprocedure. He felt it best to continue full mechanical ventilatory support. I was called urgently to the bedside to assist with the provision of ongoing ventilator and critical care support.    ROS:  Respiratory: unable to perform due to the patient's inability to effectively communicate, Cardiac: unable to perform due to the patient's inability to effectively communicate, GI: unable to perform due to the patient's inability to effectively communicate.  All other systems negative.    Interval Events:  24 hour interval history reviewed    - I will replace Mg   - SBT   - protonix/octreotide   - just held levophed   - may start diuresis if bp better today    PFSH:  No change.    Respiratory:  Cotton Vent Mode: APVCMV, Rate (breaths/min): 16, Vt Target (mL): 420, PEEP/CPAP: 8, FiO2: 45, Static Compliance (ml / cm H2O): 60, Control VTE (exp VT): 386  Pulse Oximetry: 98 %  Chest Tube Drains:          Exam: diminished breath sounds moderate  ImagingAvailable data reviewed   Recent Labs      07/26/17   2112  07/27/17   0507  07/28/17   0507   ISTATAPH  7.381*  7.396*  7.421   ISTATAPCO2  36.5  35.0  33.2    ISTATAPO2  77  62*  58*   ISTATATCO2  23  23  23   [id number]  95  92*  91*   ISTATARTHCO3  21.6  21.5  21.6   ISTATARTBE  -3  -3  -3   ISTATTEMP  see below  36.9 C  98.0 F   ISTATFIO2  40  35  35   ISTATSPEC  Arterial  Arterial  Arterial   ISTATAPHTC   --   7.397*  7.426   [id number]   --   62*  57*       HemoDynamics:  Pulse: (!) 128, Heart Rate (Monitored): (!) 127  NIBP: 147/87 mmHg       Exam: regular rate and rhythm  Imaging: Available data reviewed        Neuro:  GCS Total [place] Coma Score: 9       Exam: weakly w/d's bilat ext, not following Heavily sedated  Imaging: Available data reviewed    Fluids:  Intake/Output       07/26/17 0700 - 07/27/17 0659 07/27/17 0700 - 07/28/17 0659 07/28/17 0700 - 07/29/17 0659      0700-1859 1049-2581 Total 0700-1859 1044-9139 Total 1068-6689 4514-1018 Total       Intake    I.V.  534.3  578.9 1113.2  723.8  465.9 1189.7  --  -- --    Crystalloid Intake 200 -- 200 -- -- -- -- -- --    Propofol Volume 4.3 117.8 122.1 128 152.8 280.8 -- -- --    Norepinephrine Volume -- 221.1 221.1 255.8 73.1 328.9 -- -- --    IV Volume (NS) 90 -- 90 -- -- -- -- -- --    IV Piggyback Volume -- -- -- 100 -- 100 -- -- --    IV Piggyback Volume (Octreotide) 120 120 240 120 120 240 -- -- --    IV Piggyback Volume (Nexium) 120 120 240 120 120 240 -- -- --    Blood  350  -- 350  --  -- --  --  -- --    Volume (RELEASE RED BLOOD CELLS) 350 -- 350 -- -- -- -- -- --    Other  --  60 60  150  270 420  --  -- --    Medications (P.O./ Enteral Liquids) -- 60 60 150 270 420 -- -- --    Enteral  --  60 60  60  120 180  --  -- --    Free Water / Tube Flush -- 60 60 60 120 180 -- -- --    Total Intake 884.3 698.9 1583.2 933.8 855.9 1789.7 -- -- --       Output    Urine  250  450 700  270  200 470  --  -- --    Condom Catheter 0 -- 0 -- -- -- -- -- --    Number of Times Voided -- -- -- -- 1 x 1 x -- -- --    Indwelling Cathether 250 450 700 270 200 470 -- -- --    Stool  --  -- --  --  -- --  --  -- --     Number of Times Stooled -- 2 x 2 x 5 x 3 x 8 x -- -- --    Blood  150  -- 150  --  -- --  --  -- --    Est. Blood Loss (mL) 150 -- 150 -- -- -- -- -- --    Total Output 400 450 850 270 200 470 -- -- --       Net I/O     484.3 248.9 733.2 663.8 655.9 1319.7 -- -- --        Weight: 105.8 kg (233 lb 4 oz)  Recent Labs      17   04117   SODIUM  145  144  143   POTASSIUM  4.4  4.8  4.2   CHLORIDE  117*  117*  117*   CO2  24  23  22   BUN  14  23*  25*   CREATININE  0.69  0.86  0.84   MAGNESIUM  1.8  1.8  1.8   PHOSPHORUS   --   4.0  3.4   CALCIUM  8.0*  8.1*  8.2*       GI/Nutrition:  Exam: abdomen is soft and non-tender  Imaging: Available data reviewed  NPO  Liver Function  Recent Labs      17   04117   ALTSGPT  28  23   --    ASTSGOT  33  37   --    ALKPHOSPHAT  75  66   --    TBILIRUBIN  1.9*  2.8*   --    GLUCOSE  87  116*  125*       Heme:  Recent Labs      17   1045   17   1708   172  17   1530  17   0428   RBC   --    --    --    --   2.69*   --   2.51*   HEMOGLOBIN   --    < >   --    < >  7.4*  7.5*  7.1*   HEMATOCRIT   --    --    --    --   23.5*   --   22.5*   PLATELETCT   --    --    --    --   111*   --   74*   PROTHROMBTM   --    --   19.6*   --    --    --   19.1*   INR   --    --   1.61*   --    --    --   1.55*   IRON  24*   --    --    --    --    --    --    TOTIRONBC  274   --    --    --    --    --    --     < > = values in this interval not displayed.       Infectious Disease:  Temp  Av.6 °C (97.8 °F)  Min: 36.1 °C (97 °F)  Max: 37 °C (98.6 °F)  Micro: reviewed  Recent Labs      17   0418  17   0442  17   0428   WBC   --   12.4*  8.4   NEUTSPOLYS   --    --   69.10   LYMPHOCYTES   --    --   18.30*   MONOCYTES   --    --   8.70   EOSINOPHILS   --    --   2.30   BASOPHILS   --    --   0.50   ASTSGOT  33  37   --    ALTSGPT  28  23   --    ALKPHOSPHAT  75  66   --     TBILIRUBIN  1.9*  2.8*   --      Current Facility-Administered Medications   Medication Dose Frequency Provider Last Rate Last Dose   • magnesium sulfate IVPB premix 2 g  2 g Once Pavel Marin PHARMD 25 mL/hr at 07/28/17 0802 2 g at 07/28/17 0802   • cefTRIAXone (ROCEPHIN) 2 g in  mL IVPB  2 g Q24HRS Dany Mcdaniel M.D. 200 mL/hr at 07/28/17 0843 2 g at 07/28/17 0843   • pantoprazole (PROTONIX) 80 mg in  mL Infusion  8 mg/hr Continuous Zachary Chang M.D. 25 mL/hr at 07/28/17 0915 8 mg/hr at 07/28/17 0915   • lactulose 20 GM/30ML solution 30 mL  30 mL TID Renan Simpson D.O.   30 mL at 07/28/17 0802   • Respiratory Care per Protocol   Continuous RT Norm Ledezma M.D.       • senna-docusate (PERICOLACE or SENOKOT S) 8.6-50 MG per tablet 2 Tab  2 Tab BID Norm Ledezma M.D.   2 Tab at 07/28/17 0802    And   • polyethylene glycol/lytes (MIRALAX) PACKET 1 Packet  1 Packet QDAY PRN Norm Ledezma M.D.        And   • magnesium hydroxide (MILK OF MAGNESIA) suspension 30 mL  30 mL QDAY PRN Norm Ledezma M.D.        And   • bisacodyl (DULCOLAX) suppository 10 mg  10 mg QDAY PRN Norm Ledezma M.D.       • chlorhexidine (PERIDEX) 0.12 % solution 15 mL  15 mL BID Norm Ledezma M.D.   15 mL at 07/28/17 0802   • lidocaine (XYLOCAINE) 1%  injection  1-2 mL Q30 MIN PRN Norm Ledezma M.D.       • MD ALERT...Adult ICU Electrolyte Replacement per Pharmacy Protocol   pharmacy to dose Norm Ledezma M.D.       • MD ALERT...Pharmacy to implement PROPOFOL CRITICAL CARE PROTOCOL 1 Each  1 Each PRN Norm Ledezma M.D.       • fentaNYL (SUBLIMAZE) injection 25 mcg  25 mcg Q HOUR PRN Norm Ledezma M.D.        Or   • fentaNYL (SUBLIMAZE) injection 50 mcg  50 mcg Q HOUR PRN Norm Ledezma M.D.   50 mcg at 07/28/17 0823    Or   • fentaNYL (SUBLIMAZE) injection 100 mcg  100 mcg Q HOUR PRN Norm Ledezma M.D.   100 mcg at 07/26/17 2200   • ipratropium-albuterol (DUONEB) nebulizer solution 3 mL  3 mL Q2HRS PRN  (RT) Norm Ledezma M.D.       • ipratropium-albuterol (DUONEB) nebulizer solution 3 mL  3 mL Q4HRS (RT) Norm Ledezma M.D.   3 mL at 07/28/17 0716   • propofol (DIPRIVAN) injection  0-80 mcg/kg/min Continuous Pavel Marin PHARMD 6.2 mL/hr at 07/28/17 0912 10 mcg/kg/min at 07/28/17 0912   • norepinephrine (LEVOPHED) 8 mg in  mL Infusion  0-30 mcg/min Continuous LENY Rice.O.   Stopped at 07/28/17 0830   • octreotide (SANDOSTATIN) 1,250 mcg in  mL Infusion  50 mcg/hr Continuous Renan Simpson D.O. 10 mL/hr at 07/27/17 2328 50 mcg/hr at 07/27/17 2328   • haloperidol lactate (HALDOL) injection 5 mg  5 mg Q6HRS PRN Zach Evans M.D.   5 mg at 07/28/17 0802   • rifaximin (XIFAXAN) tablet 550 mg  550 mg BID Silas Rivera M.D.   550 mg at 07/28/17 0802   • gabapentin (NEURONTIN) capsule 100 mg  100 mg TID Doron Martinez D.O.   100 mg at 07/28/17 0802   • acetaminophen (TYLENOL) tablet 650 mg  650 mg Q4HRS PRN Doron Martinez D.O.   650 mg at 07/19/17 1723   • sucralfate (CARAFATE) 1 GM/10ML suspension 1 g  1 g Q6HRS Doron Martinez D.O.   1 g at 07/28/17 0553   • ondansetron (ZOFRAN) syringe/vial injection 4 mg  4 mg Q4HRS PRN Doron Allison M.D.   4 mg at 07/23/17 0833   • ondansetron (ZOFRAN ODT) dispertab 4 mg  4 mg Q4HRS PRN Doron Allison M.D.   4 mg at 07/23/17 0832   • promethazine (PHENERGAN) tablet 12.5-25 mg  12.5-25 mg Q4HRS PRN Doron Allison M.D.   25 mg at 07/19/17 1025   • promethazine (PHENERGAN) suppository 12.5-25 mg  12.5-25 mg Q4HRS PRN Doron Allison M.D.       • prochlorperazine (COMPAZINE) injection 5-10 mg  5-10 mg Q4HRS PRN Doron Allison M.D.   5 mg at 07/23/17 0832   • glucose 4 g chewable tablet 16 g  16 g Q15 MIN PRN Doron Allison M.D.        And   • dextrose 50% (D50W) injection 25 mL  25 mL Q15 MIN PRN Doron Allison M.D.         Last reviewed on 7/17/2017  5:24 PM by Marli Garcia    Quality  Measures:  Radiology images reviewed, Medications  reviewed and Labs reviewed                  Assessment and Plan:    Acute hypoxemic respiratory failure              - Continue full mechanical ventilatory support              - continue to provide low tidal volume lung protective ventilation              - I discussed in detail with respiratory therapist today. Ventilator adjustments have been made   - SBT but not appropriate for liberation; continue full vent support   -   Acute upper gastrointestinal bleed secondary to esophageal varices              - Status post multiple esophageal variceal banding procedure 7/17/2017              - Repeat banding procedure 7/26/17              - continue on Nexium and octreotide infusions              - Cont lactulose and rifaximin.  Acute blood loss anemia              - Monitor and transfuse only if needed maintaining a restrictive transfusion threshold unless active bleeding recurs              - Coagulopathy is being corrected  Encephalopathy              - Suspect portosystemic etiology              - Lactulose and rifaximin  Hypotension/shock   - This may be a combination of interest to volume depletion and hemorrhage   - We'll continue to follow serial hemoglobin and replace as needed   - I have ordered a serum cortisol level   - I will provide IV fluid crystalloid bolus   - Lower suspicion for sepsis, however wbc's slightly increased and low threshold for initiation of antibiotics and culturing  Discussed patient condition and risk of morbidity and/or mortality with RN, RT and QA team.    The patient remains critically ill.  Critical care time = 31 minutes in directly providing and coordinating critical care and extensive data review.  No time overlap and excludes procedures.

## 2017-07-28 NOTE — PROGRESS NOTES
Gastroenterology Progress Note     Author: Miguel Thomas   Date & Time Created: 7/28/2017 11:30 AM    Interval History:  -remains intubated  -2 bloody stools overnight which may just be residual blood as his stomach was full of blood yesterday  He had no further blood stools today     Review of Systems:  Review of Systems   Unable to perform ROS      Physical Exam:  Physical Exam   Eyes: Pupils are equal, round, and reactive to light. No scleral icterus.   Neck: Normal range of motion.   Cardiovascular: Normal rate and regular rhythm.    Pulmonary/Chest: Effort normal and breath sounds normal.   Abdominal: Soft. Bowel sounds are normal. He exhibits distension. There is no tenderness.   Musculoskeletal: He exhibits edema.       Labs:  Recent Labs      07/26/17   2112  07/27/17   0507  07/28/17   0507   ISTATAPH  7.381*  7.396*  7.421   ISTATAPCO2  36.5  35.0  33.2   ISTATAPO2  77  62*  58*   ISTATATCO2  23  23  23   [id number]  95  92*  91*   ISTATARTHCO3  21.6  21.5  21.6   ISTATARTBE  -3  -3  -3   ISTATTEMP  see below  36.9 C  98.0 F   ISTATFIO2  40  35  35   ISTATSPEC  Arterial  Arterial  Arterial   ISTATAPHTC   --   7.397*  7.426   [id number]   --   62*  57*         Recent Labs      07/26/17   0418  07/27/17   0442  07/28/17   0428   SODIUM  145  144  143   POTASSIUM  4.4  4.8  4.2   CHLORIDE  117*  117*  117*   CO2  24  23  22   BUN  14  23*  25*   CREATININE  0.69  0.86  0.84   MAGNESIUM  1.8  1.8  1.8   PHOSPHORUS   --   4.0  3.4   CALCIUM  8.0*  8.1*  8.2*     Recent Labs      07/26/17   0418  07/27/17   0442  07/28/17   0428   ALTSGPT  28  23   --    ASTSGOT  33  37   --    ALKPHOSPHAT  75  66   --    TBILIRUBIN  1.9*  2.8*   --    GLUCOSE  87  116*  125*     Recent Labs      07/25/17   1708   07/27/17   0442  07/27/17   1530  07/28/17   0428   RBC   --    --   2.69*   --   2.51*   HEMOGLOBIN   --    < >  7.4*  7.5*  7.1*   HEMATOCRIT   --    --   23.5*   --   22.5*   PLATELETCT   --    --   111*   --    74*   PROTHROMBTM  19.6*   --    --    --   19.1*   INR  1.61*   --    --    --   1.55*    < > = values in this interval not displayed.     Recent Labs      17   0418  17   0442  17   0428   WBC   --   12.4*  8.4   NEUTSPOLYS   --    --   69.10   LYMPHOCYTES   --    --   18.30*   MONOCYTES   --    --   8.70   EOSINOPHILS   --    --   2.30   BASOPHILS   --    --   0.50   ASTSGOT  33  37   --    ALTSGPT  28  23   --    ALKPHOSPHAT  75  66   --    TBILIRUBIN  1.9*  2.8*   --      Hemodynamics:  Temp (24hrs), Av.6 °C (97.8 °F), Min:36.1 °C (97 °F), Max:37 °C (98.6 °F)  Temperature: 36.8 °C (98.3 °F)  Pulse  Av.5  Min: 64  Max: 139Heart Rate (Monitored): 100  NIBP: (!) 91/55 mmHg     Respiratory:  Cotton Vent Mode: APVCMV, Rate (breaths/min): 16, PEEP/CPAP: 8, FiO2: 45, P Peak (PIP): 17, P MEAN: 10 Respiration: 16, Pulse Oximetry: 96 %     Work Of Breathing / Effort: Vented  RUL Breath Sounds: Clear, RML Breath Sounds: Diminished, RLL Breath Sounds: Diminished, LESLEY Breath Sounds: Clear, LLL Breath Sounds: Diminished  Fluids:    Intake/Output Summary (Last 24 hours) at 17 0701  Last data filed at 17 0600   Gross per 24 hour   Intake 1583.17 ml   Output    850 ml   Net 733.17 ml     Weight: 105.8 kg (233 lb 4 oz)  GI/Nutrition:  Orders Placed This Encounter   Procedures   • Diet NPO     Standing Status: Standing      Number of Occurrences: 1      Standing Expiration Date:      Order Specific Question:  Type:     Answer:  Now [1]     Order Specific Question:  Restrict to:     Answer:  Strict [1]     Medical Decision Making, by Problem:  Active Hospital Problems    Diagnosis   • *Hypovolemic shock (CMS-HCC) [R57.1]-due to GI blood loss   • Acute gastrointestinal hemorrhage [K92.2]-likely from varices and not esophageal ulcer but the actual bleeding site could not be identified; 3 additional bands placed    • ARF (acute renal failure) (CMS-HCC) [N17.9]-resolved    • Acute blood loss  anemia [D62]   • Pain of upper abdomen [R10.10]   • Fever [R50.9]   • Hypokalemia [E87.6]   • Low serum magnesium level [E83.42]   • Alcoholism (CMS-HCC) [F10.20]   • Cirrhosis (CMS-HCC) [K74.60]   *AMS-likely from HE but baseline mentation is not known    Plan:  -continue lactulose and xifaxan  -watch for further bleeding; consider TIPS if ongoing bleeding but given his poor mentation at present, this would be a difficult decision  -PRBC as needed  -start tube feeds today no further signs of bleeding  -probably should resume antibiotics given re bleeding with liver disease  -stop propranolol while on octreotide, resume when octreotide is stopped as BP allows  -continue Carafate for another few days but then can d/c   Discussed with nursing staff  Continue supportive care.      Core Measures

## 2017-07-28 NOTE — CARE PLAN
Problem: Ventilation Defect:  Goal: Ability to achieve and maintain unassisted ventilation or tolerate decreased levels of ventilator support  Intervention: Support and monitor invasive and noninvasive mechanical ventilation  Adult Ventilation Update    Total Vent Days: 3      Patient Lines/Drains/Airways Status    Active Airway      Name: Placement date: Placement time: Site: Days:     Airway Group ET Tube Oral 8.5 @ 27 07/26/17  1500  Oral  3

## 2017-07-28 NOTE — CARE PLAN
Problem: Bowel/Gastric:  Goal: Normal bowel function is maintained or improved  Instruct about strategies to promote bowel elimination and management of diarrhea, constipation and incontinence.          Problem: Skin Integrity  Goal: Risk for impaired skin integrity will decrease  Assess for skin breakdown related to bowel incontinence and instruct on proper skin care and strategies to reduce skin irritation.

## 2017-07-29 NOTE — PROGRESS NOTES
Pulmonary Critical Care Progress Note        DOS:  7/29/2017    Chief Complaint: Respiratory failure    History of Present Illness: Dane Lincoln is a 57 y.o. male who is an alcoholic with known cirrhosis. He presented to the ED on 7/17/2017 with hematemesis and hemorrhagic shock. He was taken urgently for upper endoscopy where esophageal varices were banded. He was monitored closely in the intensive care unit. Today he's had a significant drop in his hemoglobin and an episode of hematochezia ×2 7/25. Dr. Mcdaniel took him to the OR for repeat endoscopy where he was found to have ulcers from prior banding and further esophageal varices were banded. He was brought back from the operating room on full mechanical ventilatory support. Discussed case in detail with the anesthesiologist. Patient was deeply obtunded preprocedure. He felt it best to continue full mechanical ventilatory support. I was called urgently to the bedside to assist with the provision of ongoing ventilator and critical care support.    ROS:  Respiratory: unable to perform due to the patient's inability to effectively communicate, Cardiac: unable to perform due to the patient's inability to effectively communicate, GI: unable to perform due to the patient's inability to effectively communicate.  All other systems negative.    Interval Events:  24 hour interval history reviewed    - tmax 98.4   - +1.9L over last 24hrs, grossly + since admit   - cxr, per my interpretation, low lung volumes, bibasilar process    Exam:  Gen: nad  Heent: pupils equal  Cvs: rate wnl  Resp: diminished  Abdo: distended, hypoactive  Ext: + edema    PFSH:  No change.    Respiratory:  Cotton Vent Mode: Spont, Rate (breaths/min): 16, Vt Target (mL): 420, PEEP/CPAP: 8, FiO2: 45, P Support: 5, Static Compliance (ml / cm H2O): 144, Control VTE (exp VT): 804  Pulse Oximetry: 100 %  Chest Tube Drains:            ImagingAvailable data reviewed   Recent Labs      07/27/17   6631   07/28/17   0507  07/29/17   0500   ISTATAPH  7.396*  7.421  7.354*   ISTATAPCO2  35.0  33.2  41.1*   ISTATAPO2  62*  58*  66   ISTATATCO2  23  23  24   [id number]  92*  91*  92*   ISTATARTHCO3  21.5  21.6  22.9   ISTATARTBE  -3  -3  -2   ISTATTEMP  36.9 C  98.0 F  37.0 C   ISTATFIO2  35  35  40   ISTATSPEC  Arterial  Arterial  Arterial   ISTATAPHTC  7.397*  7.426  7.354*   [id number]  62*  57*  66       HemoDynamics:  Pulse: 96, Heart Rate (Monitored): 92  Blood Pressure: 122/58 mmHg, NIBP: 107/59 mmHg         Imaging: Available data reviewed        Neuro:  GCS Total Aster Coma Score: 9         Imaging: Available data reviewed    Fluids:  Intake/Output       07/27/17 0700 - 07/28/17 0659 07/28/17 0700 - 07/29/17 0659 07/29/17 0700 - 07/30/17 0659      6533-3414 6773-3317 Total 9480-7768 2923-3572 Total 5559-1801 5782-5071 Total       Intake    I.V.  723.8  465.9 1189.7  599.4  404.4 1003.8  --  -- --    Magnesium Sulfate Volume -- -- -- 50 -- 50 -- -- --    Propofol Volume 128 152.8 280.8 85.4 74.4 159.8 -- -- --    Norepinephrine Volume 255.8 73.1 328.9 14 -- 14 -- -- --    IV Volume (Protonix) -- -- -- 200 250 450 -- -- --    IV Piggyback Volume 100 -- 100 100 -- 100 -- -- --    IV Piggyback Volume (Octreotide) 120 120 240 120 80 200 -- -- --    IV Piggyback Volume (Nexium) 120 120 240 30 -- 30 -- -- --    Blood  --  -- --  --  456.3 456.3  --  -- --    Volume (RELEASE RED BLOOD CELLS) -- -- -- -- 456.3 456.3 -- -- --    Other  150  270 420  100  160 260  --  -- --    Medications (P.O./ Enteral Liquids) 150 270 420 100 160 260 -- -- --    Enteral  60  120 180  215  450 665  --  -- --    Enteral Volume -- -- -- 125 360 485 -- -- --    Free Water / Tube Flush 60 120 180 90 90 180 -- -- --    Total Intake 933.8 855.9 1789.7 914.4 1470.7 2385 -- -- --       Output    Urine  270  200 470  180  220 400  --  -- --    Number of Times Voided -- 1 x 1 x -- -- -- -- -- --    Indwelling Cathether 270 200 470 180 220 400  -- -- --    Emesis  --  -- --  --  -- --  --  -- --    Emesis - Number of Times -- -- -- -- 2 x 2 x -- -- --    Stool  --  -- --  --  -- --  --  -- --    Number of Times Stooled 5 x 3 x 8 x 2 x 4 x 6 x -- -- --    Total Output 270 200 470 180 220 400 -- -- --       Net I/O     663.8 655.9 1319.7 734.4 1250.7  -- -- --           Recent Labs      17   0300   SODIUM  144  143  140   POTASSIUM  4.8  4.2  3.8   CHLORIDE  117*  117*  114*   CO2  23  22  22   BUN  23*  25*  23*   CREATININE  0.86  0.84  0.84   MAGNESIUM  1.8  1.8  2.0   PHOSPHORUS  4.0  3.4  2.9   CALCIUM  8.1*  8.2*  8.4*       GI/Nutrition:    Imaging: Available data reviewed  NPO  Liver Function  Recent Labs      17   0300   ALTSGPT  23   --    --    ASTSGOT  37   --    --    ALKPHOSPHAT  66   --    --    TBILIRUBIN  2.8*   --    --    GLUCOSE  116*  125*  140*       Heme:  Recent Labs      17   1535  17   0300   RBC  2.69*   --   2.51*   --   2.78*   HEMOGLOBIN  7.4*   < >  7.1*  6.4*  8.1*   HEMATOCRIT  23.5*   --   22.5*   --   25.4*   PLATELETCT  111*   --   74*   --   63*   PROTHROMBTM   --    --   19.1*   --    --    INR   --    --   1.55*   --    --     < > = values in this interval not displayed.       Infectious Disease:  Temp  Av.7 °C (98 °F)  Min: 36.5 °C (97.7 °F)  Max: 36.9 °C (98.4 °F)  Micro: reviewed  Recent Labs      17   0428  17   0300   WBC  12.4*  8.4  10.5   NEUTSPOLYS   --   69.10  75.20*   LYMPHOCYTES   --   18.30*  13.80*   MONOCYTES   --   8.70  3.70   EOSINOPHILS   --   2.30  0.90   BASOPHILS   --   0.50  0.90   ASTSGOT  37   --    --    ALTSGPT  23   --    --    ALKPHOSPHAT  66   --    --    TBILIRUBIN  2.8*   --    --      Current Facility-Administered Medications   Medication Dose Frequency Provider Last Rate Last Dose   • cefTRIAXone (ROCEPHIN) 2 g in  mL  IVPB  2 g Q24HRS Dany Mcdaniel M.D.   Stopped at 07/28/17 0913   • pantoprazole (PROTONIX) 80 mg in  mL Infusion  8 mg/hr Continuous Zachary Chang M.D. 25 mL/hr at 07/29/17 0714 8 mg/hr at 07/29/17 0714   • lactulose 20 GM/30ML solution 30 mL  30 mL TID Renan Simpson D.O.   30 mL at 07/28/17 2015   • Respiratory Care per Protocol   Continuous RT Norm Ledezma M.D.       • senna-docusate (PERICOLACE or SENOKOT S) 8.6-50 MG per tablet 2 Tab  2 Tab BID Norm Ledezma M.D.   Stopped at 07/28/17 2100    And   • polyethylene glycol/lytes (MIRALAX) PACKET 1 Packet  1 Packet QDAY PRN Norm Ledezma M.D.        And   • magnesium hydroxide (MILK OF MAGNESIA) suspension 30 mL  30 mL QDAY PRN Norm Ledezma M.D.        And   • bisacodyl (DULCOLAX) suppository 10 mg  10 mg QDAY PRN Norm Ledezma M.D.       • chlorhexidine (PERIDEX) 0.12 % solution 15 mL  15 mL BID Norm Ledezma M.D.   15 mL at 07/28/17 2015   • lidocaine (XYLOCAINE) 1%  injection  1-2 mL Q30 MIN PRN Norm Ledezma M.D.       • MD ALERT...Adult ICU Electrolyte Replacement per Pharmacy Protocol   pharmacy to dose Norm Ledezma M.D.       • MD ALERT...Pharmacy to implement PROPOFOL CRITICAL CARE PROTOCOL 1 Each  1 Each PRN Norm Ledezma M.D.       • fentaNYL (SUBLIMAZE) injection 25 mcg  25 mcg Q HOUR PRN Norm Ledezma M.D.        Or   • fentaNYL (SUBLIMAZE) injection 50 mcg  50 mcg Q HOUR PRN Norm Ledezma M.D.   50 mcg at 07/28/17 0923    Or   • fentaNYL (SUBLIMAZE) injection 100 mcg  100 mcg Q HOUR PRN Norm Ledezma M.D.   100 mcg at 07/26/17 2200   • ipratropium-albuterol (DUONEB) nebulizer solution 3 mL  3 mL Q2HRS PRN (RT) Norm Ledezma M.D.       • ipratropium-albuterol (DUONEB) nebulizer solution 3 mL  3 mL Q4HRS (RT) Norm Ledezma M.D.   3 mL at 07/29/17 0657   • propofol (DIPRIVAN) injection  0-80 mcg/kg/min Continuous Pavel Marin PHARMD 12.4 mL/hr at 07/29/17 0250 20 mcg/kg/min at 07/29/17 0250   • norepinephrine  (LEVOPHED) 8 mg in  mL Infusion  0-30 mcg/min Continuous Renan Simpson D.O.   Stopped at 07/28/17 0830   • octreotide (SANDOSTATIN) 1,250 mcg in  mL Infusion  50 mcg/hr Continuous GLADIS RiceO. 10 mL/hr at 07/29/17 0501 50 mcg/hr at 07/29/17 0501   • haloperidol lactate (HALDOL) injection 5 mg  5 mg Q6HRS PRN Zach Evans M.D.   5 mg at 07/28/17 0802   • rifaximin (XIFAXAN) tablet 550 mg  550 mg BID Silas Rivera M.D.   550 mg at 07/28/17 2015   • gabapentin (NEURONTIN) capsule 100 mg  100 mg TID GLADIS DentonODewayne   100 mg at 07/28/17 2015   • acetaminophen (TYLENOL) tablet 650 mg  650 mg Q4HRS PRN GLADIS DentonODewayne   650 mg at 07/19/17 1723   • sucralfate (CARAFATE) 1 GM/10ML suspension 1 g  1 g Q6HRS LENY Denton.O.   1 g at 07/29/17 0506   • ondansetron (ZOFRAN) syringe/vial injection 4 mg  4 mg Q4HRS PRN Doron Allison M.D.   4 mg at 07/23/17 0833   • ondansetron (ZOFRAN ODT) dispertab 4 mg  4 mg Q4HRS PRN Doron Allison M.D.   4 mg at 07/23/17 0832   • promethazine (PHENERGAN) tablet 12.5-25 mg  12.5-25 mg Q4HRS PRN Doron Allison M.D.   25 mg at 07/19/17 1025   • promethazine (PHENERGAN) suppository 12.5-25 mg  12.5-25 mg Q4HRS PRN Doron Allison M.D.       • prochlorperazine (COMPAZINE) injection 5-10 mg  5-10 mg Q4HRS PRN Doron Allison M.D.   5 mg at 07/23/17 0832   • glucose 4 g chewable tablet 16 g  16 g Q15 MIN PRN Doron Allison M.D.        And   • dextrose 50% (D50W) injection 25 mL  25 mL Q15 MIN PRN Doron Allison M.D.         Last reviewed on 7/17/2017  5:24 PM by Marli Garcia    Quality  Measures:  Radiology images reviewed, Medications reviewed and Labs reviewed                  Assessment and Plan:    Acute hypoxemic respiratory failure              - Continue full mechanical ventilatory support              - Rt/02 Protocols   - I am making vent adjustments   - remains on propofol gtt and adjusting   Acute upper gastrointestinal bleed secondary  to esophageal varices              - Status post multiple esophageal variceal banding procedure 7/17/2017              - Repeat banding procedure 7/26/17              - continue on protonix and octreotide infusions              - Cont lactulose and rifaximin.   - on Ceftriaxone  Acute blood loss anemia              - Monitor and transfuse only if needed maintaining a restrictive transfusion threshold unless active bleeding recurs              - Coagulopathy is being corrected  Encephalopathy              - Suspect portosystemic etiology              - Lactulose and rifaximin  Hypotension/shock   - This may be a combination of interest to volume depletion and hemorrhage   - We'll continue to follow serial hemoglobin and replace as needed   - I have ordered a serum cortisol level   - I will provide IV fluid crystalloid bolus   - Lower suspicion for sepsis, however wbc's slightly increased and low threshold for initiation of antibiotics and culturing      Discussed patient condition and risk of morbidity and/or mortality with RN, RT and QA team.    The patient remains critically ill.  Critical care time = 32 minutes in directly providing and coordinating critical care and extensive data review.  No time overlap and excludes procedures.

## 2017-07-29 NOTE — CARE PLAN
Problem: Ventilation Defect:  Goal: Ability to achieve and maintain unassisted ventilation or tolerate decreased levels of ventilator support  Intervention: Support and monitor invasive and noninvasive mechanical ventilation  Adult Ventilation Update    Total Vent Days: 4      Patient Lines/Drains/Airways Status    Active Airway      Name: Placement date: Placement time: Site: Days:     Airway Group ET Tube Oral 8.5 07/26/17  1500  Oral  4                   Events/Summary/Plan: Increased FiO2 per abg

## 2017-07-29 NOTE — CARE PLAN
Problem: Safety  Goal: Will remain free from injury  Outcome: PROGRESSING AS EXPECTED  Hourly rounding. Provide pt with locked and low bed with bed alarm activated,    Problem: Respiratory:  Goal: Respiratory status will improve  Outcome: PROGRESSING SLOWER THAN EXPECTED  Monitor respiratory status via O2 saturation. Q2hr turns to improve oxygenation. Suction airway PRN, assess sputum. Collaborate with RT.

## 2017-07-29 NOTE — PROGRESS NOTES
[facility] Hospitalist Progress Note    Date of Service: 2017    Chief Complaint  57 y.o. male admitted 2017 with hematemesis.    Interval Problem Update  Had EGD with esophageal varices, grade IV, now s/p banding.  Pt has received blood transfusions.  Had significant bloody BM , hgb dropping. Went for repeat EGD , intubated and remains intubated.  Pt seen in ICU, ICU care given.  Discussed patient condition and plan with RN, RT and charge nurse / hot rounds.      Consultants/Specialty  GI - Dr Terry    Disposition  Patient is critically ill.   The patient is having: GI bleed  The vital organ system that is effected is the: all are at risk  If untreated there is a high chance of deterioration into: shock and death   The critical care that I am providing today is: protonix/octreotide gtts  The critical care that has been undertaken is medically complex.   There has been no overlap in critical care time.  Critical care time not including procedures, no overlap: 35 minutes        Review of Systems   Unable to perform ROS: intubated      Physical Exam  Laboratory/Imaging   Hemodynamics  Temp (24hrs), Av.7 °C (98 °F), Min:36.5 °C (97.7 °F), Max:36.9 °C (98.4 °F)   Temperature: 36.8 °C (98.2 °F)  Pulse  Av.1  Min: 64  Max: 139 Heart Rate (Monitored): (!) 105  Blood Pressure: 122/58 mmHg, NIBP: 138/70 mmHg      Respiratory  Cotton Vent Mode: APVCMV, Rate (breaths/min): 16, PEEP/CPAP: 8, PEEP/CPAP: 8, FiO2: 45, P Peak (PIP): 21, P MEAN: 12   Respiration: (!) 28, Pulse Oximetry: 90 %     Work Of Breathing / Effort: Vented  RUL Breath Sounds: Crackles, RML Breath Sounds: Crackles, RLL Breath Sounds: Diminished, LESLEY Breath Sounds: Crackles, LLL Breath Sounds: Diminished    Fluids    Intake/Output Summary (Last 24 hours) at 17 0903  Last data filed at 17 0800   Gross per 24 hour   Intake 2513.8 ml   Output    360 ml   Net 2153.8 ml       Nutrition  Orders Placed This Encounter   Procedures    • Diet NPO     Standing Status: Standing      Number of Occurrences: 1      Standing Expiration Date:      Order Specific Question:  Type:     Answer:  Now [1]     Order Specific Question:  Restrict to:     Answer:  Strict [1]     Physical Exam   Constitutional: He is intubated.   HENT:   Head: Normocephalic and atraumatic.   Mouth/Throat: No oropharyngeal exudate.   Eyes: Conjunctivae are normal. Right eye exhibits no discharge. Left eye exhibits no discharge. No scleral icterus.   Neck: Neck supple.   Cardiovascular: Normal rate and regular rhythm.    No murmur heard.  Pulmonary/Chest: No stridor. He is intubated. He has decreased breath sounds in the right lower field and the left lower field. He has rales.   Abdominal: Soft. He exhibits distension. Bowel sounds are decreased.   Musculoskeletal: He exhibits no edema.   Neurological:   Intubated and sedated    Skin: Skin is warm and dry. He is not diaphoretic.   Psychiatric: He is noncommunicative.   Nursing note and vitals reviewed.      Recent Labs      07/27/17 0442 07/28/17 0428 07/28/17   1535  07/29/17   0300   WBC  12.4*   --   8.4   --   10.5   RBC  2.69*   --   2.51*   --   2.78*   HEMOGLOBIN  7.4*   < >  7.1*  6.4*  8.1*   HEMATOCRIT  23.5*   --   22.5*   --   25.4*   MCV  87.4   --   89.6   --   92.1   MCH  27.5   --   28.3   --   28.8   MCHC  31.5*   --   31.6*   --   31.3*   RDW  66.2*   --   67.7*   --   63.0*   PLATELETCT  111*   --   74*   --   63*   MPV  11.2   --   11.8   --   11.2    < > = values in this interval not displayed.     Recent Labs      07/27/17 0442 07/28/17   0428  07/29/17   0300   SODIUM  144  143  140   POTASSIUM  4.8  4.2  3.8   CHLORIDE  117*  117*  114*   CO2  23  22  22   GLUCOSE  116*  125*  140*   BUN  23*  25*  23*   CREATININE  0.86  0.84  0.84   CALCIUM  8.1*  8.2*  8.4*     Recent Labs      07/28/17   0428   INR  1.55*         Recent Labs      07/26/17   1900  07/28/17   0428   TRIGLYCERIDE  82  137           Assessment/Plan     * Hypovolemic shock (CMS-HCC)  Assessment & Plan  - worse, now on levophed  - pt was having significant bleeding with hematochezia from cortrak as well as melena  - now still having melena    Acute gastrointestinal hemorrhage  Assessment & Plan  - initially resolved with banding  - started bleeding again 7/25, discussed with Dr Mcdaniel who felt it was likely d/t ulcer but no source noted on repeat EGD  - continue protonix/octreotide gtts    ARF (acute renal failure) (CMS-HCC)  Assessment & Plan  - resolved     Acute blood loss anemia  Assessment & Plan  - hgb continues to drop, more slowly now, source not identified on EGD but significant blood noted  - continue protonix/octreotide gtts  - received more blood  - transfuse as needed  - will need iron when more stable    Cirrhosis (CMS-HCC) (present on admission)  Assessment & Plan  - with hepatic encephalopathy  - continue rifaximin and lactulose     Alcoholism (CMS-HCC)  Assessment & Plan  - now on propofol    Low serum magnesium level  Assessment & Plan  - resolved     Hypokalemia  Assessment & Plan  - resolved     Acute respiratory failure (CMS-HCC)  Assessment & Plan  - remained intubated post EGD  - full vent support  - vent management per critical care  - cxr image and report reviewed by me  - lasix when BP improves      Labs reviewed, Medications reviewed and Radiology images reviewed  Allan catheter: No Allan        DVT prophylaxis - mechanical: SCDs  Ulcer prophylaxis: Yes

## 2017-07-29 NOTE — CARE PLAN
Problem: Safety  Goal: Will remain free from falls  Outcome: PROGRESSING AS EXPECTED  Fall interventions in place. Bed in lowest position, bed alarm on, appropriate sign placed, staff educated on mobility and risk for falls.     Problem: Venous Thromboembolism (VTW)/Deep Vein Thrombosis (DVT) Prevention:  Goal: Patient will participate in Venous Thrombosis (VTE)/Deep Vein Thrombosis (DVT)Prevention Measures  Outcome: PROGRESSING AS EXPECTED  Pt wearing SCD's. Pharmacological prophylaxis contradicted due to active bleeding.

## 2017-07-29 NOTE — CARE PLAN
Problem: Ventilation Defect:  Goal: Ability to achieve and maintain unassisted ventilation or tolerate decreased levels of ventilator support  Adult Ventilation Update    Total Vent Days: 4      Patient Lines/Drains/Airways Status    Active Airway      Name: Placement date: Placement time: Site: Days:     Airway Group ET Tube Oral 8.5 07/26/17  1500  Oral  3               APVcmv 16, 420, +8, 30%    In the last 24 hours, the patient tolerated SBT for 2 hours 40 minutes on settings of 5/8.    #FVC / Vital Capacity (liters) : 1.1 (forced, pt not following) (07/29/17 1541)  NIF (cm H2O) : -42 (forced, pt not following) (07/29/17 1541)  Rapid Shallow Breathing Index (RR/VT): 13 (07/29/17 1541)  Plateau Pressure (Q Shift): 12 (07/29/17 0243)  Static Compliance (ml / cm H2O): 110 (07/29/17 1541)    Barriers to SBT Weaning Trial Stopped due to:: Pt weaned for 1 hour and returned to rest settings per protocol (07/29/17 1541)  Length of Weaning Trial Length of Weaning Trial (Hours): 1 hour 20 minutes (07/29/17 1541)    Cough: Productive (07/29/17 1418)  Sputum Amount: Small (07/29/17 1418)  Sputum Color: White;Yellow (07/29/17 1418)  Sputum Consistency: Thick;Thin (07/29/17 1418)    Mobility Group  Activity Performed: Unable to mobilize (07/29/17 1200)  Pt Calls for Assistance: No (07/29/17 0800)  Reason Not Mobilized: Unstable condition (07/29/17 1200)  Mobilization Comments: pt restless, when moving causing strong cough which results in blood vomit (07/29/17 1200)    Events/Summary/Plan: 2 SBTs today 1 hour 20 minutes each, parameters forced due to pt not following commands, no other vent changes at this time            Problem: Bronchoconstriction:  Goal: Improve in air movement and diminished wheezing  Duoneb Q4

## 2017-07-30 NOTE — CARE PLAN
Problem: Ventilation Defect:  Goal: Ability to achieve and maintain unassisted ventilation or tolerate decreased levels of ventilator support  Intervention: Support and monitor invasive and noninvasive mechanical ventilation  Adult Ventilation Update    Total Vent Days: 5      Patient Lines/Drains/Airways Status    Active Airway      Name: Placement date: Placement time: Site: Days:     Airway Group ET Tube Oral 8.5 07/26/17  1500  Oral  5                 Events/Summary/Plan: no cahnge

## 2017-07-30 NOTE — CARE PLAN
Problem: Infection  Goal: Will remain free from infection  Outcome: PROGRESSING AS EXPECTED  Hand washing in and out of patient room. When accessing central line, scrubbing hub with alcohol swab for 15 seconds prior to each access. Monitoring for other signs and symptoms of infection.

## 2017-07-30 NOTE — PROGRESS NOTES
Pt. Noted with bloody residuals via cortrak of 100ml. Tube feedings held. Dr. Booth notified and aware of bloody residuals and morning labs. No new orders, will continue to hold tube feedings and monitor.

## 2017-07-30 NOTE — CARE PLAN
Problem: Fluid Volume:  Goal: Will maintain balanced intake and output  Outcome: PROGRESSING AS EXPECTED  Monitor I&O. Administer fluids as ordered. Monitor labs. Collaborate with MD on abnormal findings.    Problem: Psychosocial Needs:  Goal: Level of anxiety will decrease  Outcome: PROGRESSING AS EXPECTED  Hourly assessment. Provide reassurance PRN.

## 2017-07-30 NOTE — PROGRESS NOTES
Pulmonary Critical Care Progress Note        DOS:  7/30/2017    Chief Complaint: Respiratory failure    History of Present Illness: Dane Lincoln is a 57 y.o. male who is an alcoholic with known cirrhosis. He presented to the ED on 7/17/2017 with hematemesis and hemorrhagic shock. He was taken urgently for upper endoscopy where esophageal varices were banded. He was monitored closely in the intensive care unit. Today he's had a significant drop in his hemoglobin and an episode of hematochezia ×2 7/25. Dr. Mcdaniel took him to the OR for repeat endoscopy where he was found to have ulcers from prior banding and further esophageal varices were banded. He was brought back from the operating room on full mechanical ventilatory support. Discussed case in detail with the anesthesiologist. Patient was deeply obtunded preprocedure. He felt it best to continue full mechanical ventilatory support. I was called urgently to the bedside to assist with the provision of ongoing ventilator and critical care support.    ROS:  Respiratory: unable to perform due to the patient's inability to effectively communicate, Cardiac: unable to perform due to the patient's inability to effectively communicate, GI: unable to perform due to the patient's inability to effectively communicate.  All other systems negative.    Interval Events:  24 hour interval history reviewed    - tmax 99.5   - +1.3L over last 24hrs, grossly + since admit   - cxr, per my interpretation, ongoing low lung volumes, bibasilar process   - bloody op from cortrak    - hb unchanged overnight   - ammonia remains elevated   - remains on octreotide/ppi gtt   - prop at 30    Exam:  Gen: nad  Heent: pupils equal  Cvs: tachy rate  Resp: diminished  Abdo: distended, hypoactive  Ext: + edema    PFSH:  No change.    Respiratory:  Cotton Vent Mode: APVCMV, Rate (breaths/min): 16, Vt Target (mL): 420, PEEP/CPAP: 8, FiO2: 40, P Support: 5, Static Compliance (ml / cm H2O): 123,  Weaning Trial Stopped due to:: Pt weaned for 1 hour and returned to rest settings per protocol, Length of Weaning Trial (Hours): 1, Control VTE (exp VT): 502  Pulse Oximetry: 95 %  Chest Tube Drains:            ImagingAvailable data reviewed   Recent Labs      07/28/17   0507  07/29/17   0500  07/30/17   0424   ISTATAPH  7.421  7.354*  7.429   ISTATAPCO2  33.2  41.1*  32.4   ISTATAPO2  58*  66  55*   ISTATATCO2  23  24  22   [id number]  91*  92*  90*   ISTATARTHCO3  21.6  22.9  21.4   ISTATARTBE  -3  -2  -3   ISTATTEMP  98.0 F  37.0 C  37.0 C   ISTATFIO2  35  40  40   ISTATSPEC  Arterial  Arterial  Arterial   ISTATAPHTC  7.426  7.354*  7.429   [id number]  57*  66  55*       HemoDynamics:  Pulse: (!) 101, Heart Rate (Monitored): (!) 101  NIBP: 103/54 mmHg         Imaging: Available data reviewed  Recent Labs      07/29/17   1430   CPKTOTAL  50       Neuro:  GCS Total Aster Coma Score: 9         Imaging: Available data reviewed    Fluids:  Intake/Output       07/28/17 0700 - 07/29/17 0659 07/29/17 0700 - 07/30/17 0659 07/30/17 0700 - 07/31/17 0659      1359-5632 2152-7416 Total 8690-3159 8588-3414 Total 0416-4443 9274-3118 Total       Intake    I.V.  599.4  404.4 1003.8  668.8  543.4 1212.2  371.7  -- 371.7    Magnesium Sulfate Volume 50 -- 50 -- -- -- -- -- --    Propofol Volume 85.4 74.4 159.8 148.8 133.4 282.2 61.7 -- 61.7    Norepinephrine Volume 14 -- 14 -- -- -- -- -- --    IV Volume (Protonix) 200 250 450 300 300 600 150 -- 150    IV Piggyback Volume 100 -- 100 100 -- 100 100 -- 100    IV Piggyback Volume (Octreotide) 120 80 200 120 110 230 60 -- 60    IV Piggyback Volume (Nexium) 30 -- 30 -- -- -- -- -- --    Blood  --  456.3 456.3  --  -- --  --  -- --    Volume (RELEASE RED BLOOD CELLS) -- 456.3 456.3 -- -- -- -- -- --    Other  100  160 260  360  -- 360  --  -- --    Medications (P.O./ Enteral Liquids) 100 160 260 360 -- 360 -- -- --    Enteral  215  450 665  550  420 970  --  -- --    Enteral Volume  125 360 485 460 360 820 -- -- --    Free Water / Tube Flush 90 90 180 90 60 150 -- -- --    Total Intake 914.4 1470.7 2385 1578.8 963.4 2542.2 371.7 -- 371.7       Output    Urine  180  220 400  175  345 520  90  -- 90    Indwelling Cathether 180 220 400 175 345 520 90 -- 90    Emesis  --  -- --  --  -- --  --  -- --    Emesis - Number of Times -- 2 x 2 x 1 x -- 1 x -- -- --    Drains  --  -- --  --  100 100  --  -- --    Residual Amount (ml) (Discarded) -- -- -- -- 100 100 -- -- --    Stool/Urine  --  -- --  25  500 525  --  -- --    Measurable Stool (ml) -- -- -- 25 500 525 -- -- --    Stool  --  -- --  --  -- --  --  -- --    Number of Times Stooled 2 x 4 x 6 x 2 x -- 2 x -- -- --    Total Output 180 220 400  90 -- 90       Net I/O     734.4 1250.7 1985 1378.8 18.4 1397.2 281.7 -- 281.7        Weight: 108.1 kg (238 lb 5.1 oz)  Recent Labs      17   0209   SODIUM  143  140  143   POTASSIUM  4.2  3.8  3.5*   CHLORIDE  117*  114*  117*   CO2  22  22  22   BUN  25*  23*  23*   CREATININE  0.84  0.84  0.73   MAGNESIUM  1.8  2.0   --    PHOSPHORUS  3.4  2.9   --    CALCIUM  8.2*  8.4*  8.3*       GI/Nutrition:    Imaging: Available data reviewed  NPO  Liver Function  Recent Labs      17   0209   GLUCOSE  125*  140*  135*       Heme:  Recent Labs      17   0300  17   1430  17   0209  17   0951   RBC  2.51*   --   2.78*   --   2.79*   --    HEMOGLOBIN  7.1*   < >  8.1*  8.0*  8.0*  8.1*   HEMATOCRIT  22.5*   --   25.4*   --   26.1*   --    PLATELETCT  74*   --   63*   --   61*   --    PROTHROMBTM  19.1*   --    --    --    --    --    INR  1.55*   --    --    --    --    --     < > = values in this interval not displayed.       Infectious Disease:  Temp  Av °C (98.6 °F)  Min: 36.7 °C (98 °F)  Max: 37.5 °C (99.5 °F)  Micro: reviewed  Recent Labs      17   0428  17   0303   07/30/17   0209   WBC  8.4  10.5  14.2*   NEUTSPOLYS  69.10  75.20*  88.60*   LYMPHOCYTES  18.30*  13.80*  1.70*   MONOCYTES  8.70  3.70  4.40   EOSINOPHILS  2.30  0.90  2.60   BASOPHILS  0.50  0.90  0.90     Current Facility-Administered Medications   Medication Dose Frequency Provider Last Rate Last Dose   • cefTRIAXone (ROCEPHIN) 2 g in  mL IVPB  2 g Q24HRS Navid Hooks M.D.   Stopped at 07/30/17 0902   • pantoprazole (PROTONIX) 80 mg in  mL Infusion  8 mg/hr Continuous Zachary Chang M.D. 25 mL/hr at 07/30/17 0433 8 mg/hr at 07/30/17 0433   • lactulose 20 GM/30ML solution 30 mL  30 mL TID Renan Simpson D.O.   Stopped at 07/30/17 0900   • Respiratory Care per Protocol   Continuous RT Norm Ledezma M.D.       • senna-docusate (PERICOLACE or SENOKOT S) 8.6-50 MG per tablet 2 Tab  2 Tab BID Norm Ledezma M.D.   Stopped at 07/28/17 2100    And   • polyethylene glycol/lytes (MIRALAX) PACKET 1 Packet  1 Packet QDAY PRN Norm Ledezma M.D.        And   • magnesium hydroxide (MILK OF MAGNESIA) suspension 30 mL  30 mL QDAY PRN Norm Ledezma M.D.        And   • bisacodyl (DULCOLAX) suppository 10 mg  10 mg QDAY PRN Norm Ledezma M.D.       • chlorhexidine (PERIDEX) 0.12 % solution 15 mL  15 mL BID Norm Ledezma M.D.   15 mL at 07/30/17 0832   • lidocaine (XYLOCAINE) 1%  injection  1-2 mL Q30 MIN PRN Norm Ledezma M.D.       • MD ALERT...Adult ICU Electrolyte Replacement per Pharmacy Protocol   pharmacy to dose Norm Ledezma M.D.       • fentaNYL (SUBLIMAZE) injection 25 mcg  25 mcg Q HOUR PRN Norm Ledezma M.D.        Or   • fentaNYL (SUBLIMAZE) injection 50 mcg  50 mcg Q HOUR PRN Norm Ledezma M.D.   50 mcg at 07/28/17 0923    Or   • fentaNYL (SUBLIMAZE) injection 100 mcg  100 mcg Q HOUR PRN Norm Ledezma M.D.   100 mcg at 07/26/17 2200   • ipratropium-albuterol (DUONEB) nebulizer solution 3 mL  3 mL Q2HRS PRN (RT) Norm Ledezma M.D.       • ipratropium-albuterol (DUONEB) nebulizer  solution 3 mL  3 mL Q4HRS (RT) Norm Ledezma M.D.   3 mL at 07/30/17 1054   • propofol (DIPRIVAN) injection  0-80 mcg/kg/min Continuous Pavel Marin PHARMD 18.6 mL/hr at 07/30/17 1129 30 mcg/kg/min at 07/30/17 1129   • norepinephrine (LEVOPHED) 8 mg in  mL Infusion  0-30 mcg/min Continuous Renan Simpson D.O.   Stopped at 07/28/17 0830   • octreotide (SANDOSTATIN) 1,250 mcg in  mL Infusion  50 mcg/hr Continuous Renan Simpson D.O. 10 mL/hr at 07/30/17 0626 50 mcg/hr at 07/30/17 0626   • haloperidol lactate (HALDOL) injection 5 mg  5 mg Q6HRS PRN Zach Evans M.D.   5 mg at 07/28/17 0802   • rifaximin (XIFAXAN) tablet 550 mg  550 mg BID Silas Rivera M.D.   Stopped at 07/30/17 0900   • gabapentin (NEURONTIN) capsule 100 mg  100 mg TID Doron Martinez D.O.   Stopped at 07/30/17 0900   • acetaminophen (TYLENOL) tablet 650 mg  650 mg Q4HRS PRN LENY Denton.O.   650 mg at 07/19/17 1723   • sucralfate (CARAFATE) 1 GM/10ML suspension 1 g  1 g Q6HRS Doron Martinez D.O.   Stopped at 07/30/17 1200   • ondansetron (ZOFRAN) syringe/vial injection 4 mg  4 mg Q4HRS PRN Doron Allison M.D.   4 mg at 07/23/17 0833   • ondansetron (ZOFRAN ODT) dispertab 4 mg  4 mg Q4HRS PRN Doron Allison M.D.   4 mg at 07/23/17 0832   • promethazine (PHENERGAN) tablet 12.5-25 mg  12.5-25 mg Q4HRS PRN Doron Allison M.D.   25 mg at 07/19/17 1025   • promethazine (PHENERGAN) suppository 12.5-25 mg  12.5-25 mg Q4HRS PRN Doron Allison M.D.       • prochlorperazine (COMPAZINE) injection 5-10 mg  5-10 mg Q4HRS PRN Doron Allison M.D.   5 mg at 07/23/17 0832   • glucose 4 g chewable tablet 16 g  16 g Q15 MIN PRN Doron Allison M.D.        And   • dextrose 50% (D50W) injection 25 mL  25 mL Q15 MIN PRN Doron Allison M.D.         Last reviewed on 7/17/2017  5:24 PM by Marli Garcia    Quality  Measures:  Radiology images reviewed, Medications reviewed and Labs reviewed                  Assessment and Plan:    Acute  hypoxemic respiratory failure              - Continue full mechanical ventilatory support              - Rt/02 Protocols   - transition to precedex from propofol   - start diureses   Acute upper gastrointestinal bleed secondary to esophageal varices              - Status post multiple esophageal variceal banding procedure 7/17/2017              - Repeat banding procedure 7/26/17              - continue on protonix and octreotide infusions              - Cont lactulose and rifaximin.   - on Ceftriaxone  Acute blood loss anemia              - Monitor and transfuse as needed              - Coagulopathy is being corrected  Encephalopathy              - Suspect portosystemic etiology              - Lactulose and rifaximin   - change prop to precedex  Hypotension/shock   - This may be a combination of volume depletion and hemorrhage   - We'll continue to follow serial hemoglobin and replace as needed   - cortisol level 13 on 7/27   - off pressors   - on prophy rocephin     Discussed patient condition and risk of morbidity and/or mortality with RN, RT and QA team.    The patient remains critically ill.  Critical care time = 32 minutes in directly providing and coordinating critical care and extensive data review.  No time overlap and excludes procedures.

## 2017-07-30 NOTE — PROGRESS NOTES
Gastroenterology Progress Note     Author: Usama Keenan   Date & Time Created: 7/30/2017 2:49 PM    Interval History:  Remains intubated.   Had blood on NGT aspirate.  Stools remains green/brown.    Review of Systems:  Review of Systems   Unable to perform ROS: intubated       Physical Exam:  Physical Exam   Constitutional: He is sedated and intubated.   Eyes: No scleral icterus.   Cardiovascular: Normal rate and regular rhythm.    Pulmonary/Chest: Effort normal and breath sounds normal. He is intubated.   Abdominal: Soft. Bowel sounds are normal. He exhibits distension.   Skin: Skin is warm and dry.   Nursing note and vitals reviewed.      Labs:  Recent Labs      07/28/17   0507  07/29/17   0500  07/30/17   0424   ISTATAPH  7.421  7.354*  7.429   ISTATAPCO2  33.2  41.1*  32.4   ISTATAPO2  58*  66  55*   ISTATATCO2  23  24  22   [id number]  91*  92*  90*   ISTATARTHCO3  21.6  22.9  21.4   ISTATARTBE  -3  -2  -3   ISTATTEMP  98.0 F  37.0 C  37.0 C   ISTATFIO2  35  40  40   ISTATSPEC  Arterial  Arterial  Arterial   ISTATAPHTC  7.426  7.354*  7.429   [id number]  57*  66  55*     Recent Labs      07/29/17   1430   CPKTOTAL  50     Recent Labs      07/28/17   0428  07/29/17   0300  07/30/17   0209   SODIUM  143  140  143   POTASSIUM  4.2  3.8  3.5*   CHLORIDE  117*  114*  117*   CO2  22  22  22   BUN  25*  23*  23*   CREATININE  0.84  0.84  0.73   MAGNESIUM  1.8  2.0   --    PHOSPHORUS  3.4  2.9   --    CALCIUM  8.2*  8.4*  8.3*     Recent Labs      07/28/17   0428  07/29/17   0300  07/30/17   0209   GLUCOSE  125*  140*  135*     Recent Labs      07/28/17   0428   07/29/17   0300  07/29/17   1430  07/30/17   0209  07/30/17   0951   RBC  2.51*   --   2.78*   --   2.79*   --    HEMOGLOBIN  7.1*   < >  8.1*  8.0*  8.0*  8.1*   HEMATOCRIT  22.5*   --   25.4*   --   26.1*   --    PLATELETCT  74*   --   63*   --   61*   --    PROTHROMBTM  19.1*   --    --    --    --    --    INR  1.55*   --    --    --    --    --      < > = values in this interval not displayed.     Recent Labs      17   0428  17   0300  17   0209   WBC  8.4  10.5  14.2*   NEUTSPOLYS  69.10  75.20*  88.60*   LYMPHOCYTES  18.30*  13.80*  1.70*   MONOCYTES  8.70  3.70  4.40   EOSINOPHILS  2.30  0.90  2.60   BASOPHILS  0.50  0.90  0.90     Hemodynamics:  Temp (24hrs), Av.1 °C (98.7 °F), Min:36.7 °C (98 °F), Max:37.5 °C (99.5 °F)  Temperature: 37.5 °C (99.5 °F)  Pulse  Av.1  Min: 64  Max: 139Heart Rate (Monitored): 92  NIBP: 105/60 mmHg     Respiratory:  Cotton Vent Mode: Spont, Rate (breaths/min): 16, PEEP/CPAP: 8, FiO2: 40, P Peak (PIP): 15, P MEAN: 9 Respiration: 16, Pulse Oximetry: 97 %     Work Of Breathing / Effort: Vented  RUL Breath Sounds: Clear, RML Breath Sounds: Diminished, RLL Breath Sounds: Diminished, LESLEY Breath Sounds: Clear, LLL Breath Sounds: Diminished  Fluids:    Intake/Output Summary (Last 24 hours) at 17 1346  Last data filed at 17 0800   Gross per 24 hour   Intake   1165 ml   Output    465 ml   Net    700 ml     Weight: 108.1 kg (238 lb 5.1 oz)  GI/Nutrition:  Orders Placed This Encounter   Procedures   • Diet NPO     Standing Status: Standing      Number of Occurrences: 1      Standing Expiration Date:      Order Specific Question:  Type:     Answer:  Now [1]     Order Specific Question:  Restrict to:     Answer:  Strict [1]     Medical Decision Making, by Problem:  Active Hospital Problems    Diagnosis   • Acute gastrointestinal hemorrhage: Recurrent variceal hemorrhage s/p 2 endoscopies w/ banding.   • ARF (acute renal failure): Resolving.    • Acute blood loss anemia: Stable.    • Encephalopathy   • Alcoholism (CMS-HCC) [F10.20]   • Cirrhosis (CMS-HCC) [K74.60]       Plan:  Continue PPI/Octreotide infusions.  Continue Rifaximin  Restart TF at 30cc/hr.  If significant rebleeding will need TIPS despite underlying encephalopathy.      Labs reviewed, Radiology images reviewed and Medications  reviewed

## 2017-07-30 NOTE — RESPIRATORY CARE
Extubation    Cuff leak noted: yes  Stridor present: no     Patient toleration: well    Events/Summary/Plan: extubated pt per Dr. Hooks, cuff leak present, no stridor at this time, pt placed on 5LNC and tolerating well (07/30/17 5273)

## 2017-07-30 NOTE — CARE PLAN
Problem: Skin Integrity  Goal: Risk for impaired skin integrity will decrease  Outcome: PROGRESSING AS EXPECTED  Q2 hour turns in place. Rectal tube barrier applied to protect skin. Barrier cream applied for patients rash on bottom. All volnerable device related pressure areas inspected and padded

## 2017-07-30 NOTE — PROGRESS NOTES
Pagedejuan Keenan in regards to patients residual of 100ml of blood from cotrak at 0200. No new orders received, continue to keep tube feeds off and MD will round on patient.

## 2017-07-30 NOTE — PROGRESS NOTES
[facility] Hospitalist Progress Note    Date of Service: 2017    Chief Complaint  57 y.o. male admitted 2017 with hematemesis.    Interval Problem Update  Had EGD with esophageal varices, grade IV, now s/p banding.  Pt has received blood transfusions.  Had significant bloody BM , hgb dropping. Went for repeat EGD , intubated and remains intubated.  Pt seen in ICU, ICU care given.  Discussed patient condition and plan with RN, RT and charge nurse / hot rounds.      Consultants/Specialty  GI - Dr Terry    Disposition  Patient is critically ill.   The patient is having: GI bleed  The vital organ system that is effected is the: all are at risk  If untreated there is a high chance of deterioration into: shock and death   The critical care that I am providing today is: protonix/octreotide gtts  The critical care that has been undertaken is medically complex.   There has been no overlap in critical care time.  Critical care time not including procedures, no overlap: 36 minutes        Review of Systems   Unable to perform ROS: intubated      Physical Exam  Laboratory/Imaging   Hemodynamics  Temp (24hrs), Av.8 °C (98.3 °F), Min:36.5 °C (97.7 °F), Max:37.5 °C (99.5 °F)   Temperature: 36.8 °C (98.2 °F)  Pulse  Av.9  Min: 64  Max: 139 Heart Rate (Monitored): (!) 110  NIBP: 119/72 mmHg      Respiratory  Cotton Vent Mode: APVCMV, Rate (breaths/min): 16, PEEP/CPAP: 8, PEEP/CPAP: 8, FiO2: 50, P Peak (PIP): 14, P MEAN: 10   Respiration: (!) 30, Pulse Oximetry: 96 %     Work Of Breathing / Effort: Vented  RUL Breath Sounds: Clear After Suction;Rhonchi, RML Breath Sounds: Clear After Suction;Rhonchi, RLL Breath Sounds: Diminished, LESLEY Breath Sounds: Clear After Suction;Rhonchi, LLL Breath Sounds: Diminished    Fluids    Intake/Output Summary (Last 24 hours) at 17 0920  Last data filed at 17 0800   Gross per 24 hour   Intake 2202.16 ml   Output   1180 ml   Net 1022.16 ml       Nutrition  Orders  Placed This Encounter   Procedures   • Diet NPO     Standing Status: Standing      Number of Occurrences: 1      Standing Expiration Date:      Order Specific Question:  Type:     Answer:  Now [1]     Order Specific Question:  Restrict to:     Answer:  Strict [1]     Physical Exam   Constitutional: No distress. He is intubated.   HENT:   Head: Normocephalic and atraumatic.   Eyes: Conjunctivae are normal. Right eye exhibits no discharge. Left eye exhibits no discharge.   Neck: Neck supple. No tracheal deviation present.   Cardiovascular: Normal rate and regular rhythm.  Exam reveals no gallop.    No murmur heard.  Pulmonary/Chest: No stridor. He is intubated. He has decreased breath sounds in the right lower field and the left lower field. He has no wheezes. He has rales.   Abdominal: Soft. He exhibits distension. Bowel sounds are decreased.   Musculoskeletal: He exhibits no edema.   Neurological:   Intubated and sedated    Skin: Skin is warm and dry. He is not diaphoretic. No erythema.   Psychiatric: He is noncommunicative.   Nursing note and vitals reviewed.      Recent Labs      07/28/17 0428 07/29/17 0300 07/29/17 1430 07/30/17   0209   WBC  8.4   --   10.5   --   14.2*   RBC  2.51*   --   2.78*   --   2.79*   HEMOGLOBIN  7.1*   < >  8.1*  8.0*  8.0*   HEMATOCRIT  22.5*   --   25.4*   --   26.1*   MCV  89.6   --   92.1   --   93.5   MCH  28.3   --   28.8   --   28.7   MCHC  31.6*   --   31.3*   --   30.7*   RDW  67.7*   --   63.0*   --   63.1*   PLATELETCT  74*   --   63*   --   61*   MPV  11.8   --   11.2   --   11.6    < > = values in this interval not displayed.     Recent Labs      07/28/17 0428 07/29/17 0300 07/30/17   0209   SODIUM  143  140  143   POTASSIUM  4.2  3.8  3.5*   CHLORIDE  117*  114*  117*   CO2  22  22  22   GLUCOSE  125*  140*  135*   BUN  25*  23*  23*   CREATININE  0.84  0.84  0.73   CALCIUM  8.2*  8.4*  8.3*     Recent Labs      07/28/17   0428   INR  1.55*         Recent  Labs      07/28/17   0428   TRIGLYCERIDE  137          Assessment/Plan     * Hypovolemic shock (CMS-HCC)  Assessment & Plan  - now off levophed  - pt was having significant bleeding with hematochezia from cortrak as well as melena    Acute gastrointestinal hemorrhage  Assessment & Plan  - initially resolved with banding  - started bleeding again 7/25, discussed with Dr Mcdaniel who felt it was likely d/t ulcer but no source noted on repeat EGD  - continue protonix/octreotide gtts    ARF (acute renal failure) (CMS-[facility])  Assessment & Plan  - resolved     Acute blood loss anemia  Assessment & Plan  - no transfusions needed overnight but did not blood out of the cortrak  - closely monitor hgb  - continue protonix/octreotide gtts  - transfuse as needed  - will need iron when more stable    Cirrhosis (CMS-HCC) (present on admission)  Assessment & Plan  - with hepatic encephalopathy  - continue rifaximin and lactulose     Alcoholism (CMS-HCC)  Assessment & Plan  - now on propofol    Low serum magnesium level  Assessment & Plan  - resolved     Hypokalemia  Assessment & Plan  - resolved     Acute respiratory failure (CMS-HCC)  Assessment & Plan  - remained intubated post EGD  - full vent support  - vent management per critical care  - cxr image and report reviewed by me, stable bilateral opacities   - will start lasix      Labs reviewed, Medications reviewed and Radiology images reviewed  Allan catheter: No Allan        DVT prophylaxis - mechanical: SCDs  Ulcer prophylaxis: Yes

## 2017-07-31 NOTE — CARE PLAN
Problem: Safety  Goal: Will remain free from injury  Outcome: PROGRESSING AS EXPECTED  Pt educated on use of call bell for OOB assistance. Pt resting in bed with bed wheels locked, bed in lowest position, call bell within reach, nonskid footwear on, bed alarm on. Pt visible from nurse's station. Frequent rounding in anticipation of pt's needs.

## 2017-07-31 NOTE — PROGRESS NOTES
Gastroenterology progress Note    Name Dane Lincoln     1960   Age/Sex 57 y.o. male   MRN [id number]         ID: Mr Lincoln is a 57 y.o. male who is an alcoholic with known cirrhosis. He presented to the ED on 2017 with hematemesis and hemorrhagic shock. Urgent endoscopy and banding done on admission. Repeat endoscopy and variceal banding done on 2017 due to 2 episodes of hematochezia and he was intubated until yesterday.     Interval Problem Daily Status Update  (24 hours)   Extubated yesterday afternoon. Tube feeds restarted yesterday. No further episodes of hematemesis, melena, hematochezia. Denies abdominal pain.       Review of Systems   Constitutional: Negative for fever.   Respiratory: Negative for shortness of breath.    Cardiovascular: Negative for chest pain.   Gastrointestinal: Negative for heartburn, nausea, vomiting, abdominal pain, blood in stool and melena.     Core Measures        Physical Exam       Filed Vitals:    17 1000 17 1100 17 1200 17 1300   BP:       Pulse: 103 108 98 95   Temp: 37 °C (98.6 °F)  37.1 °C (98.8 °F)    Resp: 14 18 13 21   Height:       Weight:       SpO2: 95% 94% 95% 96%     Body mass index is 36.24 kg/(m^2).    Oxygen Therapy:  Pulse Oximetry: 96 %, O2 (LPM): 3, FIO2%: 40, O2 Delivery: Silicone Nasal Cannula    Physical Exam   Constitutional: He is well-developed, well-nourished, and in no distress.   HENT:   Head: Normocephalic and atraumatic.   cotrack in place    Eyes: Pupils are equal, round, and reactive to light.   Cardiovascular: Normal rate, regular rhythm and normal heart sounds.    Pulmonary/Chest: Effort normal and breath sounds normal.   Abdominal: Soft. Bowel sounds are normal. There is no tenderness.   Musculoskeletal: He exhibits no edema.   Neurological: He is alert.   Skin: Skin is warm.         Lab Data Review:     2017  1:26 PM    Recent Labs      17   0300  17   0209  17   0518    SODIUM  140  143  145   POTASSIUM  3.8  3.5*  3.4*   CHLORIDE  114*  117*  118*   CO2  22  22  25   BUN  23*  23*  23*   CREATININE  0.84  0.73  0.78   MAGNESIUM  2.0   --    --    PHOSPHORUS  2.9   --    --    CALCIUM  8.4*  8.3*  8.1*       Recent Labs      07/29/17 0300 07/30/17 0209 07/31/17   0518   ALTSGPT   --    --   17   ASTSGOT   --    --   36   ALKPHOSPHAT   --    --   61   TBILIRUBIN   --    --   1.4   PREALBUMIN   --    --   4.0*   GLUCOSE  140*  135*  110*       Recent Labs      07/29/17 0300 07/30/17 0209 07/30/17   2335  07/31/17 0518 07/31/17   1118   RBC  2.78*   --   2.79*   --    --   2.46*   --    HEMOGLOBIN  8.1*   < >  8.0*   < >  7.6*  7.1*  7.6*   HEMATOCRIT  25.4*   --   26.1*   --    --   23.7*   --    PLATELETCT  63*   --   61*   --    --   49*   --    IRON   --    --    --    --    --    --   150   TOTIRONBC   --    --    --    --    --    --   238*    < > = values in this interval not displayed.       Recent Labs      07/29/17 0300 07/30/17 0209 07/31/17   0518   WBC  10.5  14.2*  7.1   NEUTSPOLYS  75.20*  88.60*  66.60   LYMPHOCYTES  13.80*  1.70*  15.70*   MONOCYTES  3.70  4.40  8.90   EOSINOPHILS  0.90  2.60  3.00   BASOPHILS  0.90  0.90  0.60   ASTSGOT   --    --   36   ALTSGPT   --    --   17   ALKPHOSPHAT   --    --   61   TBILIRUBIN   --    --   1.4     Medical Decision Making, by Problem:  Active Hospital Problems      Diagnosis    •  Acute gastrointestinal hemorrhage: Recurrent variceal hemorrhage s/p 2 endoscopies w/ banding.    •  ARF (acute renal failure): Resolving.     •  Acute blood loss anemia: Stable. Normal Iron with low TIBC, elevated % saturation    •  Encephalopathy    •  Alcoholism (CMS-HCC) [F10.20]    •  Cirrhosis (CMS-HCC) [K74.60]        Plan:  Will discontinue octreotide and pantoprazole infusion   Started on Omeprazole 40 mg BID  Continue carafate, rifaximin   Continue tube feeding, advance rate as tolerated, pending swallow  evaluation       Labs reviewed, Radiology images reviewed and Medications reviewed

## 2017-07-31 NOTE — THERAPY
"  Speech Language Therapy Clinical Swallow Evaluation completed.  Functional Status: Bedside swallow evaluation completed this date. Patient obtunded, but able to wake up briefly with continued verbal and tactile stim. Patient oriented to self only, presents with moderate dysarthria, and presents with delayed processing of simple commands and questions. Patient extubated 7/30/17 at 3:40pm. He is s/p banding procedure 7/26 for esophageal varices. Today, patient presents with symptoms of moderate oral-pharyngeal dysphagia. Specifically, delayed swallow reflex, reduced laryngeal elevation upon palpation, and poor lingual/labial strength/range of motion. Patient consumed ice chips, 3 tsp of nectar thick juice via spoon, and 2 tsp. of applesauce with no overt signs of aspiration. However, patient had difficulty staying awake during PO trials. Recommend continue NPO with Cortrak due to risk of aspiration with decreased level of alertness. SLP will continue PO trials with patient in therapy.  Recommendations - Diet: 1)Diet / Liquid Recommendation: NPO, Pre-Feeding Trials with SLP Only 2) Ice chips okay with supervision                          Strategies: To Be Assessed                          Medication Administration: Medication Administration : Via Gastric Tube  Plan of Care: Will benefit from Speech Therapy 5 times per week  Post-Acute Therapy: Discharge to a transitional care facility for continued skilled therapy services.    See \"Rehab Therapy-Acute\" Patient Summary Report for complete documentation.   "

## 2017-07-31 NOTE — CARE PLAN
Problem: Nutritional:  Goal: Nutrition support tolerated and meeting greater than 85% of estimated needs  Outcome: PROGRESSING AS EXPECTED  TF running at 30 mL/hr; con't to advance to goal rate of 70 mL/hr per MD

## 2017-07-31 NOTE — PROGRESS NOTES
[facility]    Date of Service: 2017    Chief Complaint  57 y.o. male admitted 2017 with hematemesis.    Interval Problem Update  Had EGD with esophageal varices, grade IV, now s/p banding.  Pt has received blood transfusions.  Had significant bloody BM , hgb dropping. Went for repeat EGD , intubated and now extubated.  Pt seen in ICU, ICU care given.  Discussed patient condition and plan with RN, RT and charge nurse / hot rounds.      Consultants/Specialty  GI - Dr Terry    Disposition  Patient is critically ill.   The patient is having: GI bleed  The vital organ system that is effected is the: all are at risk  If untreated there is a high chance of deterioration into: shock and death   The critical care that I am providing today is: protonix/octreotide gtts  The critical care that has been undertaken is medically complex.   There has been no overlap in critical care time.  Critical care time not including procedures, no overlap: 37 minutes        Review of Systems   Unable to perform ROS: intubated      Physical Exam  Laboratory/Imaging   Hemodynamics  Temp (24hrs), Av.2 °C (99 °F), Min:36.7 °C (98 °F), Max:37.7 °C (99.9 °F)   Temperature: 37.1 °C (98.8 °F)  Pulse  Av.3  Min: 64  Max: 139 Heart Rate (Monitored): 95  NIBP: 122/60 mmHg      Respiratory  Cotton Vent Mode: Spont, Rate (breaths/min): 16, PEEP/CPAP: 8, PEEP/CPAP: 8, FiO2: 40, P Peak (PIP): 17, P MEAN: 10   Respiration: 15, Pulse Oximetry: 95 %, O2 Daily Delivery Respiratory : Silicone Nasal Cannula     Given By:: Mouthpiece, PEP/CPT Method: Positive Airway Pressure Device, Work Of Breathing / Effort: Mild  RUL Breath Sounds: Clear, RML Breath Sounds: Diminished, RLL Breath Sounds: Diminished, LESLEY Breath Sounds: Clear, LLL Breath Sounds: Diminished    Fluids    Intake/Output Summary (Last 24 hours) at 17 0939  Last data filed at 17 0900   Gross per 24 hour   Intake 1996.06 ml   Output   2490 ml    Net -493.94 ml       Nutrition  Orders Placed This Encounter   Procedures   • Diet NPO     Standing Status: Standing      Number of Occurrences: 1      Standing Expiration Date:      Order Specific Question:  Type:     Answer:  Now [1]     Order Specific Question:  Restrict to:     Answer:  Strict [1]     Physical Exam   HENT:   Head: Normocephalic and atraumatic.   Mouth/Throat: No oropharyngeal exudate.   Eyes: Right eye exhibits no discharge. Left eye exhibits no discharge.   Neck: Neck supple.   Cardiovascular: Normal rate and regular rhythm.    No murmur heard.  Pulmonary/Chest: No stridor. He has decreased breath sounds in the right lower field and the left lower field. He has no wheezes. He has no rhonchi. He has rales.   Abdominal: Soft. He exhibits distension. Bowel sounds are decreased.   Musculoskeletal: He exhibits no edema.   Neurological:   arousable but confused   Skin: Skin is warm and dry. He is not diaphoretic.   Psychiatric: He is slowed. Cognition and memory are impaired.   Nursing note and vitals reviewed.      Recent Labs      07/29/17   0300 07/30/17   0209 07/30/17   0951  07/30/17   2335  07/31/17   0518   WBC  10.5   --   14.2*   --    --   7.1   RBC  2.78*   --   2.79*   --    --   2.46*   HEMOGLOBIN  8.1*   < >  8.0*  8.1*  7.6*  7.1*   HEMATOCRIT  25.4*   --   26.1*   --    --   23.7*   MCV  92.1   --   93.5   --    --   96.3   MCH  28.8   --   28.7   --    --   28.9   MCHC  31.3*   --   30.7*   --    --   30.0*   RDW  63.0*   --   63.1*   --    --   64.6*   PLATELETCT  63*   --   61*   --    --   49*   MPV  11.2   --   11.6   --    --   12.6    < > = values in this interval not displayed.     Recent Labs      07/29/17   0300  07/30/17   0209  07/31/17   0518   SODIUM  140  143  145   POTASSIUM  3.8  3.5*  3.4*   CHLORIDE  114*  117*  118*   CO2  22  22  25   GLUCOSE  140*  135*  110*   BUN  23*  23*  23*   CREATININE  0.84  0.73  0.78   CALCIUM  8.4*  8.3*  8.1*             Recent  Labs      07/31/17   0518   TRIGLYCERIDE  82          Assessment/Plan     * Hypovolemic shock (CMS-HCC)  Assessment & Plan  - now off levophed  - pt was having significant bleeding with hematochezia from cortrak as well as melena    Acute gastrointestinal hemorrhage  Assessment & Plan  - initially resolved with banding  - started bleeding again 7/25, discussed with Dr Mcdaniel who felt it was likely d/t ulcer but no source noted on repeat EGD  - continue protonix gtt    ARF (acute renal failure) (CMS-HCC)  Assessment & Plan  - resolved     Acute blood loss anemia  Assessment & Plan  - no gross bleeding at this time, will d/c octreotide gtt  - closely monitor hgb  - continue protonix gtt  - transfuse as needed  - received iron, will repeat iron studies     Cirrhosis (CMS-HCC) (present on admission)  Assessment & Plan  - with hepatic encephalopathy, mentation seems improved   - continue rifaximin and lactulose     Schizophrenia, unspecified type (present on admission)  Assessment & Plan  - pt will need psych consult at some point when more alert and verbal     Alcoholism (CMS-HCC)  Assessment & Plan  - improved, now off propofol  - monitor     Low serum magnesium level  Assessment & Plan  - resolved     Hypokalemia  Assessment & Plan  - resolved     Acute respiratory failure (CMS-HCC)  Assessment & Plan  - remained intubated post EGD  - extubated on 7/30  - cxr image and report reviewed by me, improved bilateral opacities   - continue lasix      Labs reviewed, Medications reviewed and Radiology images reviewed  Allan catheter: No Allan        DVT prophylaxis - mechanical: SCDs  Ulcer prophylaxis: Yes

## 2017-07-31 NOTE — PROGRESS NOTES
Pulmonary Critical Care Progress Note        DOS:  7/31/2017    Chief Complaint: Respiratory failure    History of Present Illness: Dane Lincoln is a 57 y.o. male who is an alcoholic with known cirrhosis. He presented to the ED on 7/17/2017 with hematemesis and hemorrhagic shock. He was taken urgently for upper endoscopy where esophageal varices were banded. He was monitored closely in the intensive care unit. Today he's had a significant drop in his hemoglobin and an episode of hematochezia ×2 7/25. Dr. Mcdaniel took him to the OR for repeat endoscopy where he was found to have ulcers from prior banding and further esophageal varices were banded. He was brought back from the operating room on full mechanical ventilatory support. Discussed case in detail with the anesthesiologist. Patient was deeply obtunded preprocedure. He felt it best to continue full mechanical ventilatory support. I was called urgently to the bedside to assist with the provision of ongoing ventilator and critical care support.    ROS:  Respiratory: unable to perform due to the patient's inability to effectively communicate, Cardiac: unable to perform due to the patient's inability to effectively communicate, GI: unable to perform due to the patient's inability to effectively communicate.  All other systems negative.    Interval Events:  24 hour interval history reviewed    - extubated without complications   - diuresing well   - decreasing WBC and platelets    PFSH:  No change.    Respiratory:   4 lpm n/c  Pulse Oximetry: 96 %            Exam: coarse rhonchi B bases, no wheeze, breathing comfortably  ImagingCXR  I have personally reviewed the chest x-ray my impression is unchanged diffuse B infiltrates/edema, enlarged cardiac silhouette, LLVs   Recent Labs      07/29/17   0500  07/30/17   0424   ISTATAPH  7.354*  7.429   ISTATAPCO2  41.1*  32.4   ISTATAPO2  66  55*   ISTATATCO2  24  22   [id number]  92*  90*   ISTATARTHCO3  22.9  21.4    ISTATARTBE  -2  -3   ISTATTEMP  37.0 C  37.0 C   ISTATFIO2  40  40   ISTATSPEC  Arterial  Arterial   ISTATAPHTC  7.354*  7.429   [id number]  66  55*   ABG: none today    HemoDynamics:  Pulse: 95, Heart Rate (Monitored): 93  NIBP: 116/61 mmHg       Exam: RRR, no murmur, distant heart sounds, mild LE edema  Imaging: Available data reviewed  Recent Labs      07/29/17   1430   CPKTOTAL  50       Neuro:  GCS Total Aster Coma Score: 12       Exam: AAO x 1, generalized weakness, no focal deficits  Imaging: Available data reviewed    Fluids:  Intake/Output       07/29/17 0700 - 07/30/17 0659 07/30/17 0700 - 07/31/17 0659 07/31/17 0700 - 08/01/17 0659      6609-9819 7035-1016 Total 1892-7852 0668-2355 Total 5377-9540 8694-9431 Total       Intake    I.V.  668.8  543.4 1212.2  629.6  431.3 1060.9  --  -- --    Precedex Volume -- -- -- 21.6 11.3 32.8 -- -- --    Propofol Volume 148.8 133.4 282.2 88 -- 88 -- -- --    IV Volume (Protonix) 300 300 600 300 300 600 -- -- --    IV Piggyback Volume 100 -- 100 100 -- 100 -- -- --    IV Piggyback Volume (Octreotide) 120 110 230 120 120 240 -- -- --    Other  360  -- 360  90  120 210  --  -- --    Medications (P.O./ Enteral Liquids) 360 -- 360 90 120 210 -- -- --    Enteral  550  420 970  160  530 690  --  -- --    Enteral Volume 460 360 820 100 350 450 -- -- --    Free Water / Tube Flush 90 60 150 60 180 240 -- -- --    Total Intake 1578.8 963.4 2542.2 879.6 1081.3 1960.9 -- -- --       Output    Urine  175  345 520  1165  235 1400  --  -- --    Indwelling Cathether 175  235 1400 -- -- --    Emesis  --  -- --  --  -- --  --  -- --    Emesis - Number of Times 1 x -- 1 x -- -- -- -- -- --    Drains  --  100 100  --  -- --  --  -- --    Residual Amount (ml) (Discarded) -- 100 100 -- -- -- -- -- --    Stool/Urine  25  500 525  300  -- 300  --  -- --    Measurable Stool (ml) 25 500 525 300 -- 300 -- -- --    Stool  --  -- --  --  -- --  --  -- --    Number of Times Stooled 2  x -- 2 x -- -- -- -- -- --    Total Output  3579 863 5175 -- -- --       Net I/O     1378.8 18.4 1397.2 -585.4 846.3 260.9 -- -- --           Recent Labs      17   SODIUM  140  143  145   POTASSIUM  3.8  3.5*  3.4*   CHLORIDE  114*  117*  118*   CO2  22  22  25   BUN  23*  23*  23*   CREATININE  0.84  0.73  0.78   MAGNESIUM  2.0   --    --    PHOSPHORUS  2.9   --    --    CALCIUM  8.4*  8.3*  8.1*       GI/Nutrition:  Exam: soft, NT/ND, tolerating TFs  Imaging: Available data reviewed  NPO and tube feed Tolerated  Liver Function  Recent Labs      17   ALTSGPT   --    --   17   ASTSGOT   --    --   36   ALKPHOSPHAT   --    --   61   TBILIRUBIN   --    --   1.4   PREALBUMIN   --    --   4.0*   GLUCOSE  140*  135*  110*       Heme:  Recent Labs      17   0951  175  17   RBC  2.78*   --   2.79*   --    --   2.46*   HEMOGLOBIN  8.1*   < >  8.0*  8.1*  7.6*  7.1*   HEMATOCRIT  25.4*   --   26.1*   --    --   23.7*   PLATELETCT  63*   --   61*   --    --   49*    < > = values in this interval not displayed.       Infectious Disease:  Temp  Av.2 °C (98.9 °F)  Min: 36.7 °C (98 °F)  Max: 37.7 °C (99.9 °F)  Micro: reviewed  Recent Labs      17   WBC  10.5  14.2*  7.1   NEUTSPOLYS  75.20*  88.60*  66.60   LYMPHOCYTES  13.80*  1.70*  15.70*   MONOCYTES  3.70  4.40  8.90   EOSINOPHILS  0.90  2.60  3.00   BASOPHILS  0.90  0.90  0.60   ASTSGOT   --    --   36   ALTSGPT   --    --   17   ALKPHOSPHAT   --    --   61   TBILIRUBIN   --    --   1.4     Current Facility-Administered Medications   Medication Dose Frequency Provider Last Rate Last Dose   • furosemide (LASIX) injection 40 mg  40 mg BID DIURETIC Navid Hooks M.D.   40 mg at 17 0555   • dexmedetomidine (PRECEDEX) 200 mcg in NS 50 mL infusion   0-1.5 mcg/kg/hr Continuous Navid Hooks M.D.   Stopped at 07/31/17 0000   • cefTRIAXone (ROCEPHIN) 2 g in  mL IVPB  2 g Q24HRS Navid Hooks M.D.   Stopped at 07/30/17 0902   • pantoprazole (PROTONIX) 80 mg in  mL Infusion  8 mg/hr Continuous Zachary Chang M.D. 25 mL/hr at 07/31/17 0200 8 mg/hr at 07/31/17 0200   • lactulose 20 GM/30ML solution 30 mL  30 mL TID Renan Simpson D.O.   30 mL at 07/31/17 0800   • Respiratory Care per Protocol   Continuous RT Norm Ledezma M.D.       • senna-docusate (PERICOLACE or SENOKOT S) 8.6-50 MG per tablet 2 Tab  2 Tab BID Norm Ledezma M.D.   2 Tab at 07/31/17 0800    And   • polyethylene glycol/lytes (MIRALAX) PACKET 1 Packet  1 Packet QDAY PRN Norm Ledezma M.D.        And   • magnesium hydroxide (MILK OF MAGNESIA) suspension 30 mL  30 mL QDAY PRN Norm Ledezma M.D.        And   • bisacodyl (DULCOLAX) suppository 10 mg  10 mg QDAY PRN Norm Ledezma M.D.       • lidocaine (XYLOCAINE) 1%  injection  1-2 mL Q30 MIN PRN Norm Ledezma M.D.       • MD ALERT...Adult ICU Electrolyte Replacement per Pharmacy Protocol   pharmacy to dose Norm Ledezma M.D.       • fentaNYL (SUBLIMAZE) injection 25 mcg  25 mcg Q HOUR PRN Norm Ledezma M.D.        Or   • fentaNYL (SUBLIMAZE) injection 50 mcg  50 mcg Q HOUR PRN Norm Ledzema M.D.   50 mcg at 07/28/17 0923    Or   • fentaNYL (SUBLIMAZE) injection 100 mcg  100 mcg Q HOUR PRN Norm Ledezma M.D.   100 mcg at 07/26/17 2200   • ipratropium-albuterol (DUONEB) nebulizer solution 3 mL  3 mL Q2HRS PRN (RT) Norm Ledezma M.D.   3 mL at 07/30/17 2254   • propofol (DIPRIVAN) injection  0-80 mcg/kg/min Continuous Pavel Marin PHARMD   Stopped at 07/30/17 1325   • norepinephrine (LEVOPHED) 8 mg in  mL Infusion  0-30 mcg/min Continuous LENY Rice.O.   Stopped at 07/28/17 0830   • octreotide (SANDOSTATIN) 1,250 mcg in  mL Infusion  50 mcg/hr Continuous LENY Rice.O. 10 mL/hr at  07/30/17 0626 50 mcg/hr at 07/30/17 0626   • haloperidol lactate (HALDOL) injection 5 mg  5 mg Q6HRS PRN Zach Evans M.D.   5 mg at 07/28/17 0802   • rifaximin (XIFAXAN) tablet 550 mg  550 mg BID Silas Rivera M.D.   550 mg at 07/31/17 0800   • gabapentin (NEURONTIN) capsule 100 mg  100 mg TID Doron Martinez D.ODewayne   100 mg at 07/31/17 0800   • acetaminophen (TYLENOL) tablet 650 mg  650 mg Q4HRS PRN LENY Denton.ODewayne   650 mg at 07/30/17 2142   • sucralfate (CARAFATE) 1 GM/10ML suspension 1 g  1 g Q6HRS LENY Denotn.O.   1 g at 07/31/17 0555   • ondansetron (ZOFRAN) syringe/vial injection 4 mg  4 mg Q4HRS PRN Doron Allison M.D.   4 mg at 07/23/17 0833   • ondansetron (ZOFRAN ODT) dispertab 4 mg  4 mg Q4HRS PRN Doron Allison M.D.   4 mg at 07/23/17 0832   • promethazine (PHENERGAN) tablet 12.5-25 mg  12.5-25 mg Q4HRS PRN Doron Allison M.D.   25 mg at 07/19/17 1025   • promethazine (PHENERGAN) suppository 12.5-25 mg  12.5-25 mg Q4HRS PRN Doron Allison M.D.       • prochlorperazine (COMPAZINE) injection 5-10 mg  5-10 mg Q4HRS PRN Doron Allison M.D.   5 mg at 07/23/17 0832   • glucose 4 g chewable tablet 16 g  16 g Q15 MIN PRN Doron Allison M.D.        And   • dextrose 50% (D50W) injection 25 mL  25 mL Q15 MIN PRN Doron Allison M.D.         Last reviewed on 7/17/2017  5:24 PM by Marli Garcia    Quality  Measures:  Radiology images reviewed, Medications reviewed and Labs reviewed  Allan catheter: Critically Ill - Requiring Accurate Measurement of Urinary Output  Central line in place: Need for access    DVT Prophylaxis: Contraindicated - High bleeding risk  DVT prophylaxis - mechanical: SCDs  Ulcer prophylaxis: Yes    Assessed for rehab: Patient unable to tolerate rehabilitation therapeutic regimen      Assessment and Plan:    Acute hypoxemic respiratory failure - extubated 7/30              - encourage IS/PEP, OOB to chair              - Rt/02 Protocols   - limit sedatives   - cont  sherrie   Acute UGIB secondary to esophageal varices s/p banding procedure 7/17/2017, Repeat 7/26/17              - continue on protonix and octreotide infusions per GI              - Cont lactulose and rifaximin.   - completed Ceftriaxone  Acute blood loss anemia              - Monitor and transfuse as needed  Hepatic Encephalopathy              - Lactulose and rifaximin  Hypotension/shock - resolved  Thrombocytopenia - monitor  HypoK - repletion  Prophylaxis, enteral nutrition, therapies, d/c lacie    Discussed patient condition and risk of morbidity and/or mortality with RN, RT, Pharmacy, QA team, Patient and GI and hospitalist.    The patient remains critically ill.  Critical care time = 31 minutes in directly providing and coordinating critical care and extensive data review.  No time overlap and excludes procedures.

## 2017-07-31 NOTE — CARE PLAN
Problem: Fluid Volume:  Goal: Will maintain balanced intake and output  Outcome: PROGRESSING SLOWER THAN EXPECTED  Pt has had ~15cc/hr urine output over the last 4 hours. Called and notified on call hospitalist Dr. Chang. No new orders at this time. Will continue to monitor.

## 2017-08-01 PROBLEM — E87.0 HYPERNATREMIA: Status: ACTIVE | Noted: 2017-01-01

## 2017-08-01 NOTE — CARE PLAN
"Problem: Skin Integrity  Goal: Risk for impaired skin integrity will decrease  Outcome: PROGRESSING AS EXPECTED  Pt turned q 2 hours to prevent pressure injury to skin. Skin kept clean and dry, linens changed when soiled. Barrier paste applied to protect skin from moisture. Heels floated with pillows.     Problem: Pain Management  Goal: Pain level will decrease to patient’s comfort goal  Outcome: PROGRESSING AS EXPECTED  Pt medicated for pain with Tylenol after pt stated \"my back hurts\" but pt unable to state a number to rate his pain. After medication, pt stated \"it feels better.\" Will continue to monitor.         "

## 2017-08-01 NOTE — CARE PLAN
Problem: Mobility  Goal: Risk for activity intolerance will decrease  Outcome: PROGRESSING AS EXPECTED  Mobilized for 10 minutes today, tolerated well.     Problem: Urinary Elimination:  Goal: Ability to reestablish a normal urinary elimination pattern will improve  Outcome: PROGRESSING AS EXPECTED  Allan removed in night shift, using condom cath well.

## 2017-08-01 NOTE — PROGRESS NOTES
Pt with critical platelets of 46. This value is within patient's baseline of low platelets this hospitalization.

## 2017-08-01 NOTE — PROGRESS NOTES
Gastroenterology progress Note    Name Dane Lincoln     1960   Age/Sex 57 y.o. male   MRN [id number]         ID: Mr Lincoln is a 57 y.o. male who is an alcoholic with known cirrhosis. He presented to the ED on 2017 with hematemesis and hemorrhagic shock. Urgent endoscopy and banding done on admission. Repeat endoscopy and variceal banding done on 2017 due to 2 episodes of hematochezia and he was intubated until 3017    Interval Problem Daily Status Update  (24 hours)   Failed swallow evaluation. Tube feeds continued. Alert but oriented to self and place only. Complains of lower abdominal pain.       Review of Systems   Constitutional: Negative for fever.   Respiratory: Negative for shortness of breath.    Cardiovascular: Negative for chest pain.   Gastrointestinal: Positive for abdominal pain. Negative for heartburn, nausea, vomiting, blood in stool and melena.     Core Measures        Physical Exam       Filed Vitals:    17 0400 17 0500 17 0600 17 0843   BP:       Pulse: 78 99 80 85   Temp: 36.9 °C (98.4 °F)      Resp: 11 14 12 10   Height:       Weight:       SpO2: 97% 96% 97% 97%     Body mass index is 36.24 kg/(m^2).    Oxygen Therapy:  Pulse Oximetry: 97 %, O2 (LPM): 3, O2 Delivery: Silicone Nasal Cannula    Physical Exam   Constitutional: He is well-developed, well-nourished, and in no distress.   HENT:   Head: Normocephalic and atraumatic.   cotrack in place    Eyes: Pupils are equal, round, and reactive to light.   Cardiovascular: Normal rate, regular rhythm and normal heart sounds.    Pulmonary/Chest: Effort normal and breath sounds normal.   Abdominal: Soft. Bowel sounds are normal. There is no tenderness.   Musculoskeletal: He exhibits no edema.   Neurological: He is alert.   Oriented to self and place   Skin: Skin is warm.         Lab Data Review:     2017  1:26 PM    Recent Labs      17   0209  17   0518  17   0200    SODIUM  143  145  147*   POTASSIUM  3.5*  3.4*  3.3*   CHLORIDE  117*  118*  116*   CO2  22  25  27   BUN  23*  23*  20   CREATININE  0.73  0.78  0.71   CALCIUM  8.3*  8.1*  8.3*       Recent Labs      07/30/17   0209  07/31/17 0518  08/01/17   0200   ALTSGPT   --   17   --    ASTSGOT   --   36   --    ALKPHOSPHAT   --   61   --    TBILIRUBIN   --   1.4   --    PREALBUMIN   --   4.0*   --    GLUCOSE  135*  110*  116*       Recent Labs      07/30/17   0209   07/31/17 0518 07/31/17   1118  08/01/17   0200   RBC  2.79*   --   2.46*   --   2.61*   HEMOGLOBIN  8.0*   < >  7.1*  7.6*  7.8*   HEMATOCRIT  26.1*   --   23.7*   --   25.3*   PLATELETCT  61*   --   49*   --   46*   IRON   --    --    --   150   --    TOTIRONBC   --    --    --   238*   --     < > = values in this interval not displayed.       Recent Labs      07/30/17 0209 07/31/17 0518 08/01/17   0200   WBC  14.2*  7.1  6.8   NEUTSPOLYS  88.60*  66.60  66.10   LYMPHOCYTES  1.70*  15.70*  15.00*   MONOCYTES  4.40  8.90  10.70   EOSINOPHILS  2.60  3.00  4.10   BASOPHILS  0.90  0.60  0.70   ASTSGOT   --   36   --    ALTSGPT   --   17   --    ALKPHOSPHAT   --   61   --    TBILIRUBIN   --   1.4   --      Medical Decision Making, by Problem:  Active Hospital Problems      Diagnosis    •  Acute gastrointestinal hemorrhage: Recurrent variceal hemorrhage s/p 2 endoscopies w/ banding.    •  ARF (acute renal failure): Resolving.     •  Acute blood loss anemia: Stable. Normal Iron with low TIBC, elevated % saturation    •  Encephalopathy    •  Alcoholism (CMS-HCC) [F10.20]    •  Cirrhosis (CMS-HCC) [K74.60]        Plan:  Continue  Omeprazole 40 mg BID  Continue lactulose, rifaximin  Continue tube feeding, advance rate as tolerated, diet based on swallow evaluation.   At this point we do not have further recommendations. We will sign off. Please call with questions.       Labs reviewed, Radiology images reviewed and Medications reviewed

## 2017-08-01 NOTE — PROGRESS NOTES
Pulmonary Critical Care Progress Note        DOS:  8/1/2017    Chief Complaint: Respiratory failure    History of Present Illness: Dane Lincoln is a 57 y.o. male who is an alcoholic with known cirrhosis. He presented to the ED on 7/17/2017 with hematemesis and hemorrhagic shock. He was taken urgently for upper endoscopy where esophageal varices were banded. He was monitored closely in the intensive care unit. Today he's had a significant drop in his hemoglobin and an episode of hematochezia ×2 7/25. Dr. Mcdaniel took him to the OR for repeat endoscopy where he was found to have ulcers from prior banding and further esophageal varices were banded. He was brought back from the operating room on full mechanical ventilatory support. Discussed case in detail with the anesthesiologist. Patient was deeply obtunded preprocedure. He felt it best to continue full mechanical ventilatory support. I was called urgently to the bedside to assist with the provision of ongoing ventilator and critical care support.    ROS:  Respiratory: unable to perform due to the patient's inability to effectively communicate, Cardiac: unable to perform due to the patient's inability to effectively communicate, GI: unable to perform due to the patient's inability to effectively communicate.  All other systems negative.    Interval Events:  24 hour interval history reviewed    - remains confused, pulls at core trak with restraints in place, failed SLP   - Platelets remain stably low    PFSH:  No change.    Respiratory:   3 lpm n/c, not cooperative with IS/PEP  Pulse Oximetry: 97 %            Exam: coarse rhonchi B bases, no wheeze, breathing comfortably  ImagingCXR  I have personally reviewed the chest x-ray my impression is  no film today  Recent Labs      07/30/17   0424   ISTATAPH  7.429   ISTATAPCO2  32.4   ISTATAPO2  55*   ISTATATCO2  22   [id number]  90*   ISTATARTHCO3  21.4   ISTATARTBE  -3   ISTATTEMP  37.0 C   ISTATFIO2  40    ISTATSPEC  Arterial   ISTATAPHTC  7.429   [id number]  55*   ABG: none today    HemoDynamics:  Pulse: 80, Heart Rate (Monitored): 80  NIBP: (!) 95/50 mmHg       Exam: RRR, no murmur, borderline hypotension, mild LE edema, no ectopy  Imaging: Available data reviewed  Recent Labs      07/29/17   1430   CPKTOTAL  50       Neuro:  GCS Total Prescott Coma Score: 14       Exam: AAO x 1, generalized weakness, no focal deficits, confused  Imaging: Available data reviewed    Fluids:  Intake/Output       07/30/17 0700 - 07/31/17 0659 07/31/17 0700 - 08/01/17 0659 08/01/17 0700 - 08/02/17 0659      0700-1859 1900-0659 Total 0700-1859 1900-0659 Total 0700-1859 1900-0659 Total       Intake    I.V.  629.6  431.3 1060.9  280  -- 280  --  -- --    Precedex Volume 21.6 11.3 32.8 -- -- -- -- -- --    Propofol Volume 88 -- 88 -- -- -- -- -- --    IV Volume (Protonix) 300 300 600 200 -- 200 -- -- --    IV Piggyback Volume 100 -- 100 -- -- -- -- -- --    IV Piggyback Volume (Octreotide) 120 120 240 80 -- 80 -- -- --    Other  90  120 210  180  135 315  --  -- --    Medications (P.O./ Enteral Liquids) 90 120 210 180 135 315 -- -- --    Enteral  160  530 690  480  540 1020  --  -- --    Enteral Volume 100 350 450 390 450 840 -- -- --    Free Water / Tube Flush 60 180 240 90 90 180 -- -- --    Total Intake 879.6 1081.3 1960.9  -- -- --       Output    Urine  1165  235 1400  2535  1050 3585  --  -- --    Condom Catheter -- -- -- -- 200 200 -- -- --    Indwelling Cathether 3346 994 9332 2535 850 3385 -- -- --    Stool/Urine  300  -- 300  --  -- --  --  -- --    Measurable Stool (ml) 300 -- 300 -- -- -- -- -- --    Stool  --  -- --  --  -- --  --  -- --    Number of Times Stooled -- -- -- 1 x 4 x 5 x -- -- --    Total Output 3101 374 8695 2535 1050 3585 -- -- --       Net I/O     -585.4 846.3 260.9 -1595 -375 -1970 -- -- --           Recent Labs      07/30/17   0209  07/31/17   0518  08/01/17   0200   SODIUM  143  145  147*    POTASSIUM  3.5*  3.4*  3.3*   CHLORIDE  117*  118*  116*   CO2  22  25  27   BUN  23*  23*  20   CREATININE  0.73  0.78  0.71   CALCIUM  8.3*  8.1*  8.3*       GI/Nutrition:  Exam: soft, NT/ND, tolerating TFs  Imaging: Available data reviewed  NPO and tube feed Tolerated  Liver Function  Recent Labs      17   020   ALTSGPT   --   17   --    ASTSGOT   --   36   --    ALKPHOSPHAT   --   61   --    TBILIRUBIN   --   1.4   --    PREALBUMIN   --   4.0*   --    GLUCOSE  135*  110*  116*       Heme:  Recent Labs      17   1118  17   0200   RBC  2.79*   --   2.46*   --   2.61*   HEMOGLOBIN  8.0*   < >  7.1*  7.6*  7.8*   HEMATOCRIT  26.1*   --   23.7*   --   25.3*   PLATELETCT  61*   --   49*   --   46*   IRON   --    --    --   150   --    TOTIRONBC   --    --    --   238*   --     < > = values in this interval not displayed.       Infectious Disease:  Temp  Av.1 °C (98.7 °F)  Min: 36.8 °C (98.2 °F)  Max: 37.5 °C (99.5 °F)  Micro: reviewed  Recent Labs      17   020   WBC  14.2*  7.1  6.8   NEUTSPOLYS  88.60*  66.60  66.10   LYMPHOCYTES  1.70*  15.70*  15.00*   MONOCYTES  4.40  8.90  10.70   EOSINOPHILS  2.60  3.00  4.10   BASOPHILS  0.90  0.60  0.70   ASTSGOT   --   36   --    ALTSGPT   --   17   --    ALKPHOSPHAT   --   61   --    TBILIRUBIN   --   1.4   --      Current Facility-Administered Medications   Medication Dose Frequency Provider Last Rate Last Dose   • furosemide (LASIX) injection 40 mg  40 mg TID Jeremy M Gonda, M.D.   40 mg at 17   • omeprazole (PRILOSEC) capsule 40 mg  40 mg BID Pio Smith M.D.   40 mg at 17   • lactulose 20 GM/30ML solution 30 mL  30 mL TID Renan Simpson D.O.   30 mL at 17   • Respiratory Care per Protocol   Continuous RT Norm Ledezma M.D.       • senna-docusate (PERICOLACE or SENOKOT S) 8.6-50 MG per tablet 2  Tab  2 Tab BID Norm Ledezma M.D.   Stopped at 07/31/17 2139    And   • polyethylene glycol/lytes (MIRALAX) PACKET 1 Packet  1 Packet QDAY PRN Norm Ledezma M.D.        And   • magnesium hydroxide (MILK OF MAGNESIA) suspension 30 mL  30 mL QDAY PRN Norm Ledezma M.D.        And   • bisacodyl (DULCOLAX) suppository 10 mg  10 mg QDAY PRN Norm Ledezma M.D.       • MD ALERT...Adult ICU Electrolyte Replacement per Pharmacy Protocol   pharmacy to dose Norm Ledezma M.D.       • ipratropium-albuterol (DUONEB) nebulizer solution 3 mL  3 mL Q2HRS PRN (RT) Norm Ledezma M.D.   3 mL at 07/30/17 2254   • haloperidol lactate (HALDOL) injection 5 mg  5 mg Q6HRS PRN Zach Evans M.D.   5 mg at 07/28/17 0802   • rifaximin (XIFAXAN) tablet 550 mg  550 mg BID Silas Rivera M.D.   550 mg at 07/31/17 2139   • acetaminophen (TYLENOL) tablet 650 mg  650 mg Q4HRS PRN Doron Martinez D.ODewayne   650 mg at 07/31/17 2139   • sucralfate (CARAFATE) 1 GM/10ML suspension 1 g  1 g Q6HRS LENY Denton.O.   1 g at 08/01/17 0557   • ondansetron (ZOFRAN) syringe/vial injection 4 mg  4 mg Q4HRS PRN Doron Allison M.D.   4 mg at 07/23/17 0833   • ondansetron (ZOFRAN ODT) dispertab 4 mg  4 mg Q4HRS PRN Doron Allison M.D.   4 mg at 07/23/17 0832   • promethazine (PHENERGAN) tablet 12.5-25 mg  12.5-25 mg Q4HRS PRN Doron Allison M.D.   25 mg at 07/19/17 1025   • promethazine (PHENERGAN) suppository 12.5-25 mg  12.5-25 mg Q4HRS PRN Doron Allison M.D.       • prochlorperazine (COMPAZINE) injection 5-10 mg  5-10 mg Q4HRS PRN Doron Allison M.D.   5 mg at 07/23/17 0832   • glucose 4 g chewable tablet 16 g  16 g Q15 MIN PRN Doron Allison M.D.        And   • dextrose 50% (D50W) injection 25 mL  25 mL Q15 MIN PRN Doron Allison M.D.         Last reviewed on 7/17/2017  5:24 PM by Marli Garcia    Quality  Measures:  Radiology images reviewed, Medications reviewed and Labs reviewed  Allan catheter: Critically Ill - Requiring Accurate  Measurement of Urinary Output      DVT Prophylaxis: Contraindicated - High bleeding risk  DVT prophylaxis - mechanical: SCDs  Ulcer prophylaxis: Yes    Assessed for rehab: Patient was assess for and/or received rehabilitation services during this hospitalization      Assessment and Plan:    Acute hypoxemic respiratory failure - extubated 7/30              - encourage IS/PEP, OOB to chair              - Rt/02 Protocols   - cont diureses  Acute UGIB secondary to esophageal varices s/p banding procedure 7/17/2017, Repeat 7/26/17              - continue on PPI, GI following               - Cont lactulose and rifaximin.   - completed Ceftriaxone  Acute blood loss anemia              - Monitor and transfuse as needed  Hepatic Encephalopathy              - Lactulose and rifaximin, avoid benzodiazepines  Hypotension/shock - resolved  Thrombocytopenia - monitor  HypoK - repletion  Hypernatremia - start free water  Prophylaxis, enteral nutrition, therapies    Ok to transfer patient out of ICU today. [facility] will sign off at transfer. Please call with questions.    Discussed patient condition and risk of morbidity and/or mortality with RN, RT, Pharmacy, QA team, Patient and GI and hospitalist.

## 2017-08-01 NOTE — PROGRESS NOTES
[facility] Progress Note    Date of Service: 2017    Chief Complaint  57 y.o. male admitted 2017 with vomiting blood.     Interval Problem Update  Mr. Lincoln has hx of alcoholic cirrhosis that presented to an upper GI bleed and shock. He has undergone EGD x 2 with banding and had required intubation afterwards.   He has subsequently been extubated. He remains confused and in restraints. He is tolerating tube feeds. He failed his swallow eval. His BP is low today.   Consultants/Specialty  Critical care  GI  I discussed his condition with Dr. Gonda on [facility].   Disposition  Keep in ICU.        Review of Systems   Unable to perform ROS: mental acuity      Physical Exam  Laboratory/Imaging   Hemodynamics  Temp (24hrs), Av.1 °C (98.7 °F), Min:36.8 °C (98.2 °F), Max:37.5 °C (99.5 °F)   Temperature: 36.9 °C (98.4 °F)  Pulse  Av.8  Min: 64  Max: 139 Heart Rate (Monitored): 80  NIBP: (!) 95/50 mmHg      Respiratory      Respiration: 12, Pulse Oximetry: 97 %, O2 Daily Delivery Respiratory : Silicone Nasal Cannula     PEP/CPT Method: Positive Airway Pressure Device, Work Of Breathing / Effort: Mild;Shallow  RUL Breath Sounds: Clear, RML Breath Sounds: Diminished, RLL Breath Sounds: Diminished, LESLEY Breath Sounds: Clear, LLL Breath Sounds: Diminished    Fluids    Intake/Output Summary (Last 24 hours) at 17 0736  Last data filed at 17 0600   Gross per 24 hour   Intake   1615 ml   Output   3585 ml   Net  -1970 ml       Nutrition  Orders Placed This Encounter   Procedures   • Diet NPO     Standing Status: Standing      Number of Occurrences: 1      Standing Expiration Date:      Order Specific Question:  Type:     Answer:  Now [1]     Order Specific Question:  Restrict to:     Answer:  Strict [1]     Physical Exam   Constitutional: No distress.   HENT:   Head: Normocephalic and atraumatic.   Neck: Neck supple.   Central line   Cardiovascular: Normal rate and regular rhythm.    No murmur  heard.  Pulmonary/Chest: Effort normal. No respiratory distress. He has no wheezes.   Abdominal: He exhibits distension. There is no tenderness.   Musculoskeletal: He exhibits edema.   +edema. In restraints.    Neurological: He is alert.   Moving extremities equally. Confused. Speech is difficult to understand.    Skin: He is not diaphoretic. There is pallor.       Recent Labs      07/30/17   0209   07/31/17   0518  07/31/17   1118  08/01/17   0200   WBC  14.2*   --   7.1   --   6.8   RBC  2.79*   --   2.46*   --   2.61*   HEMOGLOBIN  8.0*   < >  7.1*  7.6*  7.8*   HEMATOCRIT  26.1*   --   23.7*   --   25.3*   MCV  93.5   --   96.3   --   96.9   MCH  28.7   --   28.9   --   29.9   MCHC  30.7*   --   30.0*   --   30.8*   RDW  63.1*   --   64.6*   --   69.3*   PLATELETCT  61*   --   49*   --   46*   MPV  11.6   --   12.6   --   12.2    < > = values in this interval not displayed.     Recent Labs      07/30/17   0209  07/31/17   0518  08/01/17   0200   SODIUM  143  145  147*   POTASSIUM  3.5*  3.4*  3.3*   CHLORIDE  117*  118*  116*   CO2  22  25  27   GLUCOSE  135*  110*  116*   BUN  23*  23*  20   CREATININE  0.73  0.78  0.71   CALCIUM  8.3*  8.1*  8.3*             Recent Labs      07/31/17   0518   TRIGLYCERIDE  82          Assessment/Plan     * Hypovolemic shock (CMS-HCC) (present on admission)  Assessment & Plan  - given IV fluids and s/p levophed  - pt was having significant bleeding from upper GI tract with melena      Acute gastrointestinal hemorrhage (present on admission)  Assessment & Plan  - initially resolved with EGD and banding 7/17 and 7/26. S/p octreotide drip  PPI    ARF (acute renal failure) (CMS-[facility]) (present on admission)  Assessment & Plan  - likely ATN resolved with fluids    Acute blood loss anemia (present on admission)  Assessment & Plan  - closely monitor hgb daily  - received iron     Cirrhosis (CMS-[facility]) (present on admission)  Assessment & Plan  Alchoholic  - with hepatic encephalopathy,  mentation seems improved   - continue rifaximin and lactulose     Schizophrenia, unspecified type (present on admission)  Assessment & Plan  - pt will need psych consult at some point when more alert and verbal     Alcoholism (CMS-HCC)  Assessment & Plan  - improved, s/p propofol  - monitor for further detox     Low serum magnesium level  Assessment & Plan  - resolved     Hypokalemia  Assessment & Plan  - resolved     Acute respiratory failure (CMS-HCC)  Assessment & Plan  - remained intubated post EGD  - extubated on 7/30      Hypernatremia (present on admission)  Assessment & Plan  Free water added.      Labs reviewed and Medications reviewed        DVT Prophylaxis: Contraindicated - High bleeding risk

## 2017-08-01 NOTE — PROGRESS NOTES
Cortrak Placement    Tube Team verified patient name and medical record number prior to tube placement.  Cortrak tube (43 inches, 10 [place]) placed at 75 cm in left nare.  Per Cortrak picture, tube appears to be in the stomach.  Nursing Instructions: Awaiting KUB to confirm placement before use for medications or feeding. Once placement confirmed, flush tube with 30 ml of water, and then remove and save stylet, in patient medication drawer.

## 2017-08-01 NOTE — DISCHARGE PLANNING
Called pt's mother, Tori, and left vm requesting she contact SW back to discuss her financial needs.

## 2017-08-01 NOTE — THERAPY
"Speech Language Therapy dysphagia treatment completed.     Functional Status: Was notified by RN to see patient for a dysphagia reassessment. RN reporting patient NPO/no nutrition 2/2 cortrak dysfunction, and with improved MELYSSA today. Patient awake and sitting up in bed. He was pleasantly confused throughout session with confabulated speech. Trace vocal quality noted prior to PO trials. Presentation of ice chips resulted in no overt s/sx of aspiration. PO trials of NTL (via tsp and cup sips) and puree resulted in moderate delay in initiation of pharyngeal swallow, intermittent faint throat clearing, 2-3 swallows to clear bolus, intermittent audible breath sounds and min increase WOB. Laryngeal elevation and hyolaryngeal excursion weak upon palpation. Presentation of 1 tsp of thin liquids resulted in immediate coughing/choking concerning for aspiration. At this time, patient is not at the level for PO and is at high risk for aspiration.     Recommendations: 1) placement of cortrak as an alternative source for nutrition, 2) OK for 5 single ice chips an hour with RN to minimize xerostomia and to preserve integrity of swallowing musculature    Plan of Care: Will benefit from Speech Therapy 3 times per week    Post-Acute Therapy: Discharge to a transitional care facility for continued skilled therapy services.    See \"Rehab Therapy-Acute\" Patient Summary Report for complete documentation.           "

## 2017-08-02 PROBLEM — R10.10 PAIN OF UPPER ABDOMEN: Status: RESOLVED | Noted: 2017-01-01 | Resolved: 2017-01-01

## 2017-08-02 PROBLEM — G93.40 ENCEPHALOPATHY ACUTE: Status: ACTIVE | Noted: 2017-01-01

## 2017-08-02 PROBLEM — R50.9 FEVER: Status: RESOLVED | Noted: 2017-01-01 | Resolved: 2017-01-01

## 2017-08-02 NOTE — DIETARY
"Nutrition Services: Weekly TF Update     Pt is on day 16 of admit.  He con't to receive nutrition support via cortrak: Replete with Fiber at goal rate of 70 ml/hr which provides 1680 kcals, 105 grams protein, 1410 mL free water and 23 gm fiber per day. Pt tolerating TF well, no GRV noted at this time.     Height: 68\"  Weight: 103 kg (227 lb 1.2 oz) - per I/Os pt is +15L since admit  Weight to Use in Calculations: 97.3 kg (214 lb 8.1 oz) - estimated dry weight   Ideal Body Weight: 69.854 kg (154 lb)  Percent Ideal Body Weight: 139.3  BMI of wt used in calculations: 32.6 - obese, class I     Pertinent Labs: Gluc 106, Na 146, K 3.3, Phos and Mg WNL, H&H 8.0/26.7, () PreAlb 4.0, CRP 2.74  Pertinent Medications: Lasix, Lactulose, Adult ICU electrolyte replacement protocol, Prilosec, Zofran (prn), KCL (stopped per MAR), Xifaxan, Spironolactone  Pertinent Fluids: FWF of 200 mL every 8 hours   Skin: per wound flowsheet, pt noted with IAD to sacrum - NO note from wound team at this time   Last BM: 17    Estimated Needs: MSJ x 1 = 1785 kcals   Total Calories / day: 1555 - 1945  (Calories / k - 20)  Total Grams Protein / day: 97.5 - 115  (Grams Protein / k - 1.2)  Total Fluids ml / day: 2436.8 ml         Assessment / Evaluation:   - Pt is on day 16 of admit   - Intubated ; extubated    - Endoscopy and esophageal varices banded on admit and repeat endoscopy and variceal banding done on 2017 due to 2 episodes of hematochezia  - Per SLP eval pt remains unsafe for po diet at this time and nutrition support is to continue   - TF is meeting estimated needs, pt is tolerating well     Plan / Recommendation:  · Con't with current TF regimen   · Fluids per MD   · Advance to po diet as pt is able per SLP   · Monitor wt and lab trends   · RD monitoring TF tolerance and will adjust as necessary     RD following         "

## 2017-08-02 NOTE — PROGRESS NOTES
Report called to TACO Del Toro. Patient transferred to S6 with CNAx2 with all supplies, chart and meds. No patient belonging bags in room.

## 2017-08-02 NOTE — CARE PLAN
Problem: Knowledge Deficit  Goal: Knowledge of disease process/condition, treatment plan, diagnostic tests, and medications will improve  Outcome: PROGRESSING SLOWER THAN EXPECTED  Patient educated on medications, tests, and general medical information. Patient updated on plan of care. Patient is A&Ox2 and requiring reorientation.    Problem: Mobility  Goal: Risk for activity intolerance will decrease  Outcome: PROGRESSING AS EXPECTED  Patient mobilized to EOB during shift. Patient tolerated for six minutes before requesting to go back to bed. Patient participated in mobilization, requiring only minimal assistance once sitting up at the EOB.

## 2017-08-02 NOTE — PROGRESS NOTES
[facility]ist Progress Note    Date of Service: 2017    Chief Complaint  57 y.o. male admitted 2017 with vomiting blood.     Interval Problem Update  Mr. Lincoln has hx of alcoholic cirrhosis that presented to an upper GI bleed and shock. He has undergone EGD x 2 with banding and had required intubation afterwards.   He has subsequently been extubated. He remains confused though mentation is improving though remains in restraints. He is tolerating tube feeds. He failed his swallow eval x 2 and is on tube feeding.  Consultants/Specialty  Critical care  GI  I discussed his condition with Dr. Samano on [facility].   Disposition  Medical floor        Review of Systems   Unable to perform ROS: mental acuity      Physical Exam  Laboratory/Imaging   Hemodynamics  Temp (24hrs), Av.9 °C (98.5 °F), Min:36.2 °C (97.2 °F), Max:37.3 °C (99.1 °F)   Temperature: 37 °C (98.6 °F)  Pulse  Av.4  Min: 64  Max: 139 Heart Rate (Monitored): 67  NIBP: (!) 94/51 mmHg      Respiratory      Respiration: (!) 10, Pulse Oximetry: 96 %, O2 Daily Delivery Respiratory : Nasal Cannula     PEP/CPT Method: Positive Airway Pressure Device, Work Of Breathing / Effort: Mild;Shallow  RUL Breath Sounds: Clear, RML Breath Sounds: Diminished, RLL Breath Sounds: Diminished, LESLEY Breath Sounds: Clear, LLL Breath Sounds: Diminished    Fluids    Intake/Output Summary (Last 24 hours) at 17 0727  Last data filed at 17 0600   Gross per 24 hour   Intake   1885 ml   Output   4160 ml   Net  -2275 ml       Nutrition  Orders Placed This Encounter   Procedures   • Diet NPO     Standing Status: Standing      Number of Occurrences: 1      Standing Expiration Date:      Order Specific Question:  Type:     Answer:  Now [1]     Order Specific Question:  Restrict to:     Answer:  Strict [1]     Physical Exam   Constitutional: No distress.   HENT:   Head: Normocephalic and atraumatic.   Neck:   Central line   Cardiovascular: Normal rate  and regular rhythm.    No murmur heard.  Pulmonary/Chest: Effort normal. No respiratory distress.   Abdominal: He exhibits distension. There is no tenderness.   Musculoskeletal: He exhibits edema. He exhibits no tenderness.   +edema. In restraints.    Neurological: He is alert.   Moving extremities equally. Confused. Speech is difficult to understand.    Skin: He is not diaphoretic. There is pallor.       Recent Labs      07/31/17   0518  07/31/17   1118  08/01/17   0200  08/02/17   0519   WBC  7.1   --   6.8  4.7*   RBC  2.46*   --   2.61*  2.71*   HEMOGLOBIN  7.1*  7.6*  7.8*  8.0*   HEMATOCRIT  23.7*   --   25.3*  26.7*   MCV  96.3   --   96.9  98.5*   MCH  28.9   --   29.9  29.5   MCHC  30.0*   --   30.8*  30.0*   RDW  64.6*   --   69.3*  90.8*   PLATELETCT  49*   --   46*  44*   MPV  12.6   --   12.2  13.0*     Recent Labs      07/31/17   0518  08/01/17   0200  08/02/17   0519   SODIUM  145  147*  146*   POTASSIUM  3.4*  3.3*  3.3*   CHLORIDE  118*  116*  112   CO2  25  27  30   GLUCOSE  110*  116*  106*   BUN  23*  20  20   CREATININE  0.78  0.71  0.69   CALCIUM  8.1*  8.3*  8.3*             Recent Labs      07/31/17   0518   TRIGLYCERIDE  82          Assessment/Plan     * Hypovolemic shock (CMS-HCC) (present on admission)  Assessment & Plan  - given IV fluids and s/p levophed  - pt was having significant bleeding from upper GI tract with melena      Acute gastrointestinal hemorrhage (present on admission)  Assessment & Plan  - initially resolved with EGD and banding 7/17 and 7/26. S/p octreotide drip  PPI    ARF (acute renal failure) (CMS-HCC) (present on admission)  Assessment & Plan  - likely ATN resolved with fluids    Acute blood loss anemia (present on admission)  Assessment & Plan  Hb has remained stable at 8. Hold on serial lab draws.   - received iron     Cirrhosis (CMS-[facility]) (present on admission)  Assessment & Plan  Alchoholic  - with hepatic encephalopathy, mentation has slowly improved   - continue  rifaximin and lactulose     Schizophrenia, unspecified type (present on admission)  Assessment & Plan  - pt will need psych consult at some point when more alert and verbal     Alcoholism (CMS-HCC)  Assessment & Plan  - improved, s/p propofol  - monitor for further detox     Low serum magnesium level  Assessment & Plan  - resolved     Hypokalemia  Assessment & Plan  - resolved     Acute respiratory failure (CMS-HCC)  Assessment & Plan  - remained intubated post EGD  - extubated on 7/30      Hypernatremia (present on admission)  Assessment & Plan  Free water added via cortrak.  Check BMP qod until he is able to tolerate a diet.     Encephalopathy acute (present on admission)  Assessment & Plan  Likely from detox and hepatic encephalopathy.       Labs reviewed and Medications reviewed        DVT Prophylaxis: Contraindicated - High bleeding risk

## 2017-08-02 NOTE — PROGRESS NOTES
Tech from Lab called with critical result of platelets at 0610. Critical lab result read back to Tech.   MDs aware patient's platelets have been trending down.

## 2017-08-02 NOTE — PROGRESS NOTES
2 RN skin assessment done by this RN and [employer] [place]. Scab noted to Lt. Heel and redness noted to groin and coccyx but skin intact.

## 2017-08-02 NOTE — PROGRESS NOTES
Pulmonary Critical Care Progress Note        Date of service:  8/2/2017      Interval Events:  24 hour interval history reviewed  Reason for visit:  Respiratory failure, acute pulmonary edema  Unable to provide CC or ROS due to altered mental status       - lethargic   - follows and moves   - oriented to name   - SR   - 2 L NC   - TF at goal   - CXR with edema   - BP low yesterday      PFSH:  No change.    Respiratory:     Pulse Oximetry: 97 %  2 L NC  CXR personally reviewed  CXR with edema  Few crackles bilaterally  No wheezing.    HemoDynamics:  Pulse: 76, Heart Rate (Monitored): 74  NIBP: 106/59 mmHg     SR  No increased edema    Neuro:  Lethargic, arouses easily  Oriented to name  No focal weakness    Fluids:  Intake/Output       07/31/17 0700 - 08/01/17 0659 08/01/17 0700 - 08/02/17 0659 08/02/17 0700 - 08/03/17 0659      2969-1064 1975-1630 Total 2720-5336 1136-9572 Total 3181-4249 9798-6255 Total       Intake    I.V.  280  -- 280  --  0 0  50  -- 50    IV Volume (Protonix) 200 -- 200 -- 0 0 -- -- --    IV Volume (NS) -- -- -- -- 0 0 -- -- --    IV Piggyback Volume -- -- -- -- 0 0 50 -- 50    IV Piggyback Volume (Octreotide) 80 -- 80 -- 0 0 -- -- --    Other  180  135 315  150  250 400  60  -- 60    Medications (P.O./ Enteral Liquids) 180 135 315 150 250 400 60 -- 60    Enteral  480  540 1020  600  885 1485  480  -- 480    Enteral Volume 390 450 840 340 615 955 280 -- 280    Free Water / Tube Flush 90 90 180 260 270 530 200 -- 200    Total Intake  750 1135 1885 590 -- 590       Output    Urine  2535  1050 3585  2335  1125 3460  0  -- 0    Condom Catheter --  1125 3460 0 -- 0    Number of Times Incontinent of Urine -- -- -- -- 1 x 1 x -- -- --    Indwelling Cathether 3245 708 3385 -- -- -- -- -- --    Stool/Urine  --  -- --  --  700 700  --  -- --    Measurable Stool (ml) -- -- -- -- 700 700 -- -- --    Stool  --  -- --  --  -- --  --  -- --    Number of Times Stooled 1 x 4 x 5 x -- 2 x  2 x -- -- --    Total Output 2535 1050 3585 2335 1825 4160 0 -- 0       Net I/O     -1595 -375 -1970 -1585 -690 -2275 590 -- 590        Weight: 103 kg (227 lb 1.2 oz)  Recent Labs      17   0200  17   1425  17   SODIUM  145  147*   --   146*   POTASSIUM  3.4*  3.3*   --   3.3*   CHLORIDE  118*  116*   --   112   CO2  25  27   --   30   BUN  23*  20   --   20   CREATININE  0.78  0.71   --   0.69   MAGNESIUM   --    --   1.8   --    PHOSPHORUS   --    --   3.9   --    CALCIUM  8.1*  8.3*   --   8.3*       GI/Nutrition:  Abd soft ND/NT    Liver Function  Recent Labs      17   ALTSGPT  17   --    --    ASTSGOT  36   --    --    ALKPHOSPHAT  61   --    --    TBILIRUBIN  1.4   --    --    PREALBUMIN  4.0*   --    --    GLUCOSE  110*  116*  106*       Heme:  Recent Labs      17   1118  17   RBC  2.46*   --   2.61*  2.71*   HEMOGLOBIN  7.1*  7.6*  7.8*  8.0*   HEMATOCRIT  23.7*   --   25.3*  26.7*   PLATELETCT  49*   --   46*  44*   IRON   --   150   --    --    TOTIRONBC   --   238*   --    --        Infectious Disease:  Temp  Av °C (98.6 °F)  Min: 36.2 °C (97.2 °F)  Max: 37.3 °C (99.1 °F)    Recent Labs      17   WBC  7.1  6.8  4.7*   NEUTSPOLYS  66.60  66.10  59.60   LYMPHOCYTES  15.70*  15.00*  23.00   MONOCYTES  8.90  10.70  11.50   EOSINOPHILS  3.00  4.10  4.10   BASOPHILS  0.60  0.70  0.90   ASTSGOT  36   --    --    ALTSGPT  17   --    --    ALKPHOSPHAT  61   --    --    TBILIRUBIN  1.4   --    --      Current Facility-Administered Medications   Medication Dose Frequency Provider Last Rate Last Dose   • potassium chloride in water (KCL) ivpb **Administer in ICU only** 20 mEq  20 mEq Once Pavel Marin, PHARMD 50 mL/hr at 17 20 mEq at 17   • spironolactone (ALDACTONE) tablet 50 mg  50 mg BID DIURETIC  Jeremy M Gonda, M.D.   50 mg at 08/02/17 0622   • omeprazole 2 mg/mL in sodium bicarbonate (PRILOSEC) oral susp 40 mg  40 mg Q12HRS Pio Smith M.D.   40 mg at 08/02/17 0920   • furosemide (LASIX) injection 40 mg  40 mg TID Jeremy M Gonda, M.D.   40 mg at 08/02/17 0920   • lactulose 20 GM/30ML solution 30 mL  30 mL TID GLADIS RiceODewayne   30 mL at 08/02/17 0920   • Respiratory Care per Protocol   Continuous RT Norm Ledezma M.D.       • senna-docusate (PERICOLACE or SENOKOT S) 8.6-50 MG per tablet 2 Tab  2 Tab BID Norm Ledezma M.D.   Stopped at 07/31/17 2139    And   • polyethylene glycol/lytes (MIRALAX) PACKET 1 Packet  1 Packet QDAY PRN Norm Ledezma M.D.        And   • magnesium hydroxide (MILK OF MAGNESIA) suspension 30 mL  30 mL QDAY PRN Norm Ledezma M.D.        And   • bisacodyl (DULCOLAX) suppository 10 mg  10 mg QDAY PRN Norm Ledezma M.D.       • MD ALERT...Adult ICU Electrolyte Replacement per Pharmacy Protocol   pharmacy to dose Norm Ledezma M.D.       • ipratropium-albuterol (DUONEB) nebulizer solution 3 mL  3 mL Q2HRS PRN (RT) Norm Ledezma M.D.   3 mL at 07/30/17 2254   • haloperidol lactate (HALDOL) injection 5 mg  5 mg Q6HRS PRN Zach Evans M.D.   5 mg at 07/28/17 0802   • rifaximin (XIFAXAN) tablet 550 mg  550 mg BID Silas Rivera M.D.   550 mg at 08/02/17 0920   • acetaminophen (TYLENOL) tablet 650 mg  650 mg Q4HRS PRN GLADIS DentonO.   650 mg at 07/31/17 2139   • sucralfate (CARAFATE) 1 GM/10ML suspension 1 g  1 g Q6HRS LENY Denton.O.   1 g at 08/02/17 0622   • ondansetron (ZOFRAN) syringe/vial injection 4 mg  4 mg Q4HRS PRN Doron Allison M.D.   4 mg at 07/23/17 0833   • ondansetron (ZOFRAN ODT) dispertab 4 mg  4 mg Q4HRS PRN Doron Allison M.D.   4 mg at 07/23/17 0832   • promethazine (PHENERGAN) tablet 12.5-25 mg  12.5-25 mg Q4HRS PRN Doron Allison M.D.   25 mg at 07/19/17 1025   • promethazine (PHENERGAN) suppository 12.5-25 mg  12.5-25 mg Q4HRS  PRN Doron Allison M.D.       • prochlorperazine (COMPAZINE) injection 5-10 mg  5-10 mg Q4HRS PRN Doron Allison M.D.   5 mg at 07/23/17 0832   • glucose 4 g chewable tablet 16 g  16 g Q15 MIN PRN Doron Allison M.D.        And   • dextrose 50% (D50W) injection 25 mL  25 mL Q15 MIN PRN Doron Allison M.D.         Last reviewed on 7/17/2017  5:24 PM by Marli Garcia    Quality  Measures:  Radiology images reviewed, Medications reviewed and Labs reviewed                        Assessment and Plan:      Acute hypoxemic respiratory failure   - cont oxygen  S/P VDRF - liberated 7/30  Acute pulmonary edema   - force diuresis as tolerated  Acute UGIB secondary to esophageal/gastric varices   - S/P EGD with banding procedure 7/17/2017, repeat 7/26/17              - continue on PPI, GI following    - completed ceftriaxone  Acute blood loss anemia              - follow and transfuse as needed  Hepatic encephalopathy              - Lactulose and rifaximin, avoid benzodiazepines    Ok to transfer out of ICU.. [facility] will sign off on transfer. Please call with questions.    Discussed with RN, RT, Team

## 2017-08-02 NOTE — CARE PLAN
Problem: Nutritional:  Goal: Nutrition support tolerated and meeting greater than 85% of estimated needs  Outcome: MET Date Met:  08/02/17

## 2017-08-02 NOTE — CARE PLAN
Problem: Bowel/Gastric:  Goal: Normal bowel function is maintained or improved  Outcome: PROGRESSING AS EXPECTED  Tube feedings at goal, lactulose titrated to 2-3 loose BMs per day.     Problem: Bowel/Gastric:  Goal: Will show no signs and symptoms of gastrointestinal bleeding  Outcome: PROGRESSING AS EXPECTED  Patient showing no signs of GI bleed. Stool assessed. No abdominal pain, bowel sounds hyperactive.

## 2017-08-03 NOTE — CARE PLAN
Problem: Safety  Goal: Will remain free from falls  Outcome: PROGRESSING AS EXPECTED  Safety precautions in place.     Problem: Bowel/Gastric:  Goal: Normal bowel function is maintained or improved  Outcome: PROGRESSING AS EXPECTED  On tube feeding. Running Replete @70cc/hr goal.

## 2017-08-03 NOTE — PROGRESS NOTES
Gastroenterology Progress Note     Author:  Pio Padgett Date & Time Created: 8/3/2017 4:34 PM    Interval History:  Hematochezia last night  HGB relatively stable 7.6  Transferred back to ICU      Review of Systems:  Review of Systems   Constitutional: Negative for fever and chills.   Respiratory: Negative.    Cardiovascular:        Stable HD   Gastrointestinal: Positive for blood in stool.        Ascites moderate   Skin: Negative for rash.       Physical Exam:  Physical Exam   Constitutional: He appears well-developed. No distress. He is sedated and not intubated.   HENT:   Head: Atraumatic.   Neck: No tracheal deviation present.   Cardiovascular: Normal rate and regular rhythm.    Pulmonary/Chest: Effort normal. No accessory muscle usage. He is not intubated. No respiratory distress. He has no wheezes.   Abdominal: Soft. He exhibits distension. There is no tenderness. There is no rebound and no guarding.   Skin: Skin is warm and dry.   Nursing note and vitals reviewed.      Labs:        Invalid input(s): [id number]      Recent Labs      08/01/17   0200  08/01/17   1425  08/02/17   0519  08/03/17   0008   SODIUM  147*   --   146*  145   POTASSIUM  3.3*   --   3.3*  3.6   CHLORIDE  116*   --   112  111   CO2  27   --   30  30   BUN  20   --   20  23*   CREATININE  0.71   --   0.69  0.68   MAGNESIUM   --   1.8   --    --    PHOSPHORUS   --   3.9   --    --    CALCIUM  8.3*   --   8.3*  8.1*     Recent Labs      08/01/17   0200  08/02/17   0519  08/03/17   0008   GLUCOSE  116*  106*  111*     Recent Labs      08/01/17   0200  08/02/17   0519  08/03/17   0008  08/03/17   0525  08/03/17   1215   RBC  2.61*  2.71*  2.74*   --    --    HEMOGLOBIN  7.8*  8.0*  7.9*  7.9*  7.6*   HEMATOCRIT  25.3*  26.7*  26.8*  26.1*  25.1*   PLATELETCT  46*  44*  57*   --    --    PROTHROMBTM   --    --   17.3*   --    --    INR   --    --   1.37*   --    --      Recent Labs      08/01/17   0200  08/02/17   0519  08/03/17   0008    WBC  6.8  4.7*  7.9   NEUTSPOLYS  66.10  59.60  66.80   LYMPHOCYTES  15.00*  23.00  19.60*   MONOCYTES  10.70  11.50  8.60   EOSINOPHILS  4.10  4.10  3.40   BASOPHILS  0.70  0.90  0.60     Hemodynamics:  Temp (24hrs), Av.7 °C (98 °F), Min:36.1 °C (97 °F), Max:37 °C (98.6 °F)  Temperature: 36.7 °C (98.1 °F)  Pulse  Av.5  Min: 64  Max: 139Heart Rate (Monitored): (!) 103  Blood Pressure: 112/73 mmHg, NIBP: 117/71 mmHg     Respiratory:    Respiration: 15, Pulse Oximetry: 96 %     Work Of Breathing / Effort: Shallow  RUL Breath Sounds: Clear, RML Breath Sounds: Diminished, RLL Breath Sounds: Diminished, LESLEY Breath Sounds: Clear, LLL Breath Sounds: Diminished  Fluids:    Intake/Output Summary (Last 24 hours) at 17 1346  Last data filed at 17 0800   Gross per 24 hour   Intake   1165 ml   Output    465 ml   Net    700 ml     Weight: 102.7 kg (226 lb 6.6 oz)  GI/Nutrition:  No orders of the defined types were placed in this encounter.     Medical Decision Making, by Problem:  Active Hospital Problems    Diagnosis   • Acute gastrointestinal hemorrhage: Recurrent variceal hemorrhage s/p 2 endoscopies w/ banding.   • ARF (acute renal failure): Resolving.    • Acute blood loss anemia: Stable.    • Encephalopathy   • Alcoholism (CMS-HCC) [F10.20]   • Cirrhosis (CMS-HCC) [K74.60]       Plan:  Continue PPI/Octreotide infusions.    Serial H and H    NPO    If signs of recurrent bleeding with hemorrhagic shock requiring transfusions would refer for TIPSS patient sofar had two attempt at endoscopic control.  Salvage TIPS in case of further significant bleeding.    D/W Dr. Gamino      Labs reviewed, Radiology images reviewed and Medications reviewed

## 2017-08-03 NOTE — CARE PLAN
Problem: Communication  Goal: The ability to communicate needs accurately and effectively will improve  Outcome: PROGRESSING AS EXPECTED  Pt confused to time and situation but alert, able to vocalize needs. Will continue to monitor.     Problem: Mobility  Goal: Risk for activity intolerance will decrease  Outcome: PROGRESSING SLOWER THAN EXPECTED  Pt currently on bedrest d/t suspected GI bleed. Will continue to monitor.

## 2017-08-03 NOTE — PROGRESS NOTES
Pulmonary Critical Care Progress Note        Date of service:  8/3/2017      Interval Events:  24 hour interval history reviewed  Reason for visit:  Respiratory failure, acute pulmonary edema       - back to ICU due to hematachezia   - SR   - 3 L NC   - oriented only to name and place (hospital)      PFSH:  No change.    Respiratory:     Pulse Oximetry: 100 %  3 L NC  CXR personally reviewed  CXR with edema  Few coarse crackles bilaterally    HemoDynamics:  Pulse: 88, Heart Rate (Monitored): 87  Blood Pressure: 112/73 mmHg, NIBP: 111/65 mmHg     SR  No increased edema    Neuro:  Awake  Oriented to name and place  No focal weakness    Fluids:  Intake/Output       08/01/17 0700 - 08/02/17 0659 08/02/17 0700 - 08/03/17 0659 08/03/17 0700 - 08/04/17 0659      9458-0782 3424-8564 Total 6417-1280 1997-1817 Total 3535-2704 1359-1714 Total       Intake    P.O.  --  -- --  --  0 0  --  -- --    P.O. -- -- -- -- 0 0 -- -- --    I.V.  --  0 0  100  -- 100  --  -- --    IV Volume (Protonix) -- 0 0 -- -- -- -- -- --    IV Volume (NS) -- 0 0 -- -- -- -- -- --    IV Piggyback Volume -- 0 0 100 -- 100 -- -- --    IV Piggyback Volume (Octreotide) -- 0 0 -- -- -- -- -- --    Other  150  250 400  60  60 120  --  -- --    Medications (P.O./ Enteral Liquids) 150 250 400 60 60 120 -- -- --    Enteral  600  885 1485  950  710 1660  --  -- --    Enteral Volume 340 615 955 420 280 700 -- -- --    Free Water / Tube Flush 260 270 530 530 430 960 -- -- --    Total Intake 750 1135 1885 8421 773 5546 -- -- --       Output    Urine  2335  1125 3460  1500  300 1800  --  -- --    Condom Catheter 2335 1125 3460 9672 638 1361 -- -- --    Number of Times Incontinent of Urine -- 1 x 1 x -- -- -- -- -- --    Stool/Urine  --  700 700  --  400 400  --  -- --    Measurable Stool (ml) -- 700 700 -- 400 400 -- -- --    Stool  --  -- --  --  -- --  --  -- --    Number of Times Stooled -- 2 x 2 x 3 x 2 x 5 x -- -- --    Total Output 8220 6280 8120 8728 390  2200 -- -- --       Net I/O     -1585 -690 -2275 -390 70 -320 -- -- --        Weight: 102.7 kg (226 lb 6.6 oz)  Recent Labs      17   1425  17   SODIUM  147*   --   146*  145   POTASSIUM  3.3*   --   3.3*  3.6   CHLORIDE  116*   --   112  111   CO2  27   --   30  30   BUN  20   --   20  23*   CREATININE  0.71   --   0.69  0.68   MAGNESIUM   --   1.8   --    --    PHOSPHORUS   --   3.9   --    --    CALCIUM  8.3*   --   8.3*  8.1*       GI/Nutrition:  Abd soft ND/NT    Liver Function  Recent Labs      17   GLUCOSE  116*  106*  111*       Heme:  Recent Labs      17   1118   17   02017   05   RBC   --    --   2.61*  2.71*  2.74*   --    HEMOGLOBIN  7.6*   --   7.8*  8.0*  7.9*  7.9*   HEMATOCRIT   --    < >  25.3*  26.7*  26.8*  26.1*   PLATELETCT   --    --   46*  44*  57*   --    PROTHROMBTM   --    --    --    --   17.3*   --    INR   --    --    --    --   1.37*   --    IRON  150   --    --    --    --    --    TOTIRONBC  238*   --    --    --    --    --     < > = values in this interval not displayed.       Infectious Disease:  Temp  Av.7 °C (98 °F)  Min: 36.1 °C (97 °F)  Max: 37 °C (98.6 °F)    Recent Labs      17   WBC  6.8  4.7*  7.9   NEUTSPOLYS  66.10  59.60  66.80   LYMPHOCYTES  15.00*  23.00  19.60*   MONOCYTES  10.70  11.50  8.60   EOSINOPHILS  4.10  4.10  3.40   BASOPHILS  0.70  0.90  0.60     Current Facility-Administered Medications   Medication Dose Frequency Provider Last Rate Last Dose   • furosemide (LASIX) tablet 40 mg  40 mg Q DAY Antoni Gamino M.D.   Stopped at 17 1600   • spironolactone (ALDACTONE) tablet 50 mg  50 mg BID DIURETIC Jeremy M Gonda, M.D.   50 mg at 17 0529   • omeprazole 2 mg/mL in sodium bicarbonate (PRILOSEC) oral susp 40 mg  40 mg Q12HRS Pio Smith M.D.    40 mg at 08/02/17 2000   • lactulose 20 GM/30ML solution 30 mL  30 mL TID Renan Simpson D.O.   30 mL at 08/02/17 2000   • Respiratory Care per Protocol   Continuous RT Norm Ledezma M.D.       • senna-docusate (PERICOLACE or SENOKOT S) 8.6-50 MG per tablet 2 Tab  2 Tab BID Norm Ledezma M.D.   Stopped at 07/31/17 2139    And   • polyethylene glycol/lytes (MIRALAX) PACKET 1 Packet  1 Packet QDAY PRN Norm Ledezma M.D.        And   • magnesium hydroxide (MILK OF MAGNESIA) suspension 30 mL  30 mL QDAY PRN Norm Ledezma M.D.        And   • bisacodyl (DULCOLAX) suppository 10 mg  10 mg QDAY PRN Norm Ledezma M.D.       • ipratropium-albuterol (DUONEB) nebulizer solution 3 mL  3 mL Q2HRS PRN (RT) Norm Ledezma M.D.   3 mL at 07/30/17 2254   • haloperidol lactate (HALDOL) injection 5 mg  5 mg Q6HRS PRN Zach Evans M.D.   5 mg at 07/28/17 0802   • rifaximin (XIFAXAN) tablet 550 mg  550 mg BID Silas Rivera M.D.   550 mg at 08/02/17 2000   • acetaminophen (TYLENOL) tablet 650 mg  650 mg Q4HRS PRN GLADIS DentonODewayne   650 mg at 08/03/17 0407   • sucralfate (CARAFATE) 1 GM/10ML suspension 1 g  1 g Q6HRS GLADIS DentonO.   1 g at 08/03/17 0528   • ondansetron (ZOFRAN) syringe/vial injection 4 mg  4 mg Q4HRS PRN Doron Allison M.D.   4 mg at 07/23/17 0833   • ondansetron (ZOFRAN ODT) dispertab 4 mg  4 mg Q4HRS PRN Doron Allison M.D.   4 mg at 07/23/17 0832   • promethazine (PHENERGAN) tablet 12.5-25 mg  12.5-25 mg Q4HRS PRN Doron Allison M.D.   25 mg at 07/19/17 1025   • promethazine (PHENERGAN) suppository 12.5-25 mg  12.5-25 mg Q4HRS PRN Doron Allison M.D.       • prochlorperazine (COMPAZINE) injection 5-10 mg  5-10 mg Q4HRS PRN Doron Allison M.D.   5 mg at 07/23/17 0832   • glucose 4 g chewable tablet 16 g  16 g Q15 MIN PRN Doron Allison M.D.        And   • dextrose 50% (D50W) injection 25 mL  25 mL Q15 MIN PRN Doron Allison M.D.         Last reviewed on 7/17/2017  5:24 PM by Marli  ARVIND Garcia    Quality  Measures:  Radiology images reviewed, Medications reviewed and Labs reviewed                        Assessment and Plan:    Acute hypoxemic respiratory failure   - cont oxygen  S/P VDRF - liberated 7/30  Acute pulmonary edema   - hold diuresis with hematochezia  Acute UGIB secondary to esophageal/gastric varices   - S/P EGD with banding procedure 7/17/2017, repeat 7/26/17              - continue on PPI and octreotide   - completed ceftriaxone   - follow Hgb, conservative transfusion strategy  Acute blood loss anemia  Hepatic encephalopathy              - Lactulose and rifaximin    Keep in ICU.  Now on octreotide and PPI gtts.  Unstable status with high risk of worsening vital organ function.    Critical Care Time:  31 minutes  [id number]  No time overlap  Time excludes procedures  Discussed with RN, RT, Team

## 2017-08-03 NOTE — PROGRESS NOTES
Bedside report received from [employer] [place]. Pt arrived to [facility] at 0020 with 2 RN's, attached to transport monitor.

## 2017-08-03 NOTE — PROGRESS NOTES
[facility] Hospitalist Progress Note    Date of Service: 8/3/2017    Chief Complaint  57 y.o. male admitted 2017 with vomiting blood.     Interval Problem Update  Mr. Lincoln has hx of alcoholic cirrhosis that presented to an upper GI bleed and shock. He has undergone EGD x 2 with banding and had required intubation afterwards.   He has subsequently been extubated. He remains confused though mentation is improving though remains in restraints. He is tolerating tube feeds. He failed his swallow eval x 2 and is on tube feeding.  Last night Mr. Lincoln had a large, bloody BM with hypotension thus was transferred to the ICU. His nurse notes that tube feeds have been held due to blood in the cortrak.  He is in restraints.   Consultants/Specialty  Critical care  GI    Disposition  ICU        Review of Systems   Unable to perform ROS: mental acuity      Physical Exam  Laboratory/Imaging   Hemodynamics  Temp (24hrs), Av.7 °C (98 °F), Min:36.1 °C (97 °F), Max:37 °C (98.6 °F)   Temperature: 36.9 °C (98.4 °F)  Pulse  Av.4  Min: 64  Max: 139 Heart Rate (Monitored): 87  Blood Pressure: 112/73 mmHg, NIBP: 111/65 mmHg      Respiratory      Respiration: (!) 11, Pulse Oximetry: 100 %        RUL Breath Sounds: Clear, RML Breath Sounds: Diminished, RLL Breath Sounds: Diminished, LESLEY Breath Sounds: Clear, LLL Breath Sounds: Diminished    Fluids    Intake/Output Summary (Last 24 hours) at 17 0721  Last data filed at 17 0600   Gross per 24 hour   Intake   1880 ml   Output   2200 ml   Net   -320 ml       Nutrition  No orders of the defined types were placed in this encounter.     Physical Exam   Constitutional: No distress.   HENT:   Head: Normocephalic and atraumatic.   Neck:   Central line   Cardiovascular: Regular rhythm.    No murmur heard.  tachycardia   Pulmonary/Chest: Effort normal. No respiratory distress.   Abdominal: He exhibits distension.   Mild, diffuse tenderness   Musculoskeletal: He exhibits edema.  He exhibits no tenderness.   +edema. In restraints.    Neurological: He is alert.   Moving extremities equally. Confused. Speech is reasonable.    Skin: He is not diaphoretic. There is pallor.       Recent Labs      08/01/17 0200 08/02/17 0519 08/03/17   0008  08/03/17   0525   WBC  6.8  4.7*  7.9   --    RBC  2.61*  2.71*  2.74*   --    HEMOGLOBIN  7.8*  8.0*  7.9*  7.9*   HEMATOCRIT  25.3*  26.7*  26.8*  26.1*   MCV  96.9  98.5*  97.8   --    MCH  29.9  29.5  28.8   --    MCHC  30.8*  30.0*  29.5*   --    RDW  69.3*  90.8*  90.9*   --    PLATELETCT  46*  44*  57*   --    MPV  12.2  13.0*  12.6   --      Recent Labs      08/01/17 0200 08/02/17 0519 08/03/17   0008   SODIUM  147*  146*  145   POTASSIUM  3.3*  3.3*  3.6   CHLORIDE  116*  112  111   CO2  27  30  30   GLUCOSE  116*  106*  111*   BUN  20  20  23*   CREATININE  0.71  0.69  0.68   CALCIUM  8.3*  8.3*  8.1*     Recent Labs      08/03/17   0008   INR  1.37*                  Assessment/Plan     * Hypovolemic shock (CMS-HCC) (present on admission)  Assessment & Plan  - given IV fluids and s/p levophed  - pt was having significant bleeding from upper GI tract with melena.   - restart IV fluids.      Acute gastrointestinal hemorrhage (present on admission)  Assessment & Plan  - initially resolved with EGD and banding 7/17 and 7/26. Octreotide drip will be restarted as well as an IV nexium drip.  I have a call out to [facility] for a consult and consideration of further esophageal banding.    ARF (acute renal failure) (CMS-HCC) (present on admission)  Assessment & Plan  - likely ATN resolved with fluids    Acute blood loss anemia (present on admission)  Assessment & Plan  Hb has remained stable at 8. Hold on serial lab draws.   - received iron     Cirrhosis (CMS-HCC) (present on admission)  Assessment & Plan  Alchoholic  - with hepatic encephalopathy, mentation has improved but he remains encephalopathic.   - continue rifaximin and  lactulose     Schizophrenia, unspecified type (present on admission)  Assessment & Plan  - pt will need psych consult at some point when more alert and verbal     Alcoholism (CMS-HCC)  Assessment & Plan    - monitor for further detox     Low serum magnesium level  Assessment & Plan  - resolved     Hypokalemia  Assessment & Plan  - resolved     Acute respiratory failure (CMS-[facility])  Assessment & Plan  - remained intubated post EGD  - extubated on 7/30      Hypernatremia (present on admission)  Assessment & Plan  Free water added via cortrak.      Encephalopathy acute (present on admission)  Assessment & Plan  Likely from detox and hepatic encephalopathy.       Labs reviewed and Medications reviewed        DVT Prophylaxis: Contraindicated - High bleeding risk  DVT prophylaxis - mechanical: SCDs              Pt is critically ill in the ICU, 31 minutes of critical care time spent with patient and nursing and in specific management of GI bleed in the ICU. See orders.

## 2017-08-03 NOTE — PROGRESS NOTES
Call received by RN to discuss pt with large bloody BM, with clots. VS stable, pt remains borderline hypotensive as earlier today.     CBC H/H 7.9/26.8, PLT 57  INR 1.37    Plan:   -Transfer to ICU for close monitoring  -H/H every 6 hours x 4  -If continues to have GIB will call GI, likely will need NM bleeding scan  -Cont BID PPI    Orders given as appropriate, see order summary.     Rounding MD/APN to reassess in AM.  RN to call with changes.

## 2017-08-03 NOTE — PROGRESS NOTES
CNA informed RN that pt had large bloody BM with blood clots. Took pt vital signs, see adult obs. Paged Diane Bar. Received orders for CBC, BMP and hemoglobin and hematocrit STAT. Diane Bar also placed orders for ICU transfer. Will continue to monitor and prepare pt for transfer.

## 2017-08-03 NOTE — PROGRESS NOTES
Pt had small amount of bright red blood coming out of his cortrak. Tube feeds paused and MD paged.

## 2017-08-03 NOTE — PROGRESS NOTES
ICU RN Hallie transferred pt to ICU. Gave report to RN. Pt transferred safely out of unit via hospital bed.

## 2017-08-03 NOTE — PROGRESS NOTES
Received report from day shift RN. Discussed POC with pt., verbalized understanding, assumed care @1915. A&Ox2. Disoriented to event and time. Pt very pleasant, cooperative with care.  On 4 liters of O2 via nasal cannula. Upper lung sounds clear. Lower lung sounds diminished. Pt complained of pain on lower back rated 9/10 on pain scale. Medicated with Tylenol per MAR. Tube feeding in place. Running Replete Fiber @70cc/hr goal. On bilateral soft wrist restraints. Q2 checks initiated. Q2 turns. Bed alarm on.   Safety precautions in place; treaded socks on, call light within reach, personal belongings within reach, upper bed rails up.

## 2017-08-03 NOTE — PROGRESS NOTES
Pt bladder scanned at shift change for >999cc, Dr. Stevenson silva. Denisa cath'd by RN for 200cc. Will continue to monitor.

## 2017-08-04 NOTE — PROGRESS NOTES
"Pt's Hemoglobin 6.4 after 1 unit of PRBC's transfused. Pt actively bleeding from rectum and states \"tasting blood in my mouth.\" Dr. Gold notified and new orders received.   "

## 2017-08-04 NOTE — PROGRESS NOTES
Pulmonary Critical Care Progress Note        Date of service:  8/4/2017      Interval Events:  24 hour interval history reviewed  Reason for visit:  Respiratory failure, acute pulmonary edema, acute blood loss anemia, coagulopathy       - Hgb down   - 2 PRBC's and 1 platelets over night   - oriented to self   - ST   - 2 L NC   - TF off   - hematochezia   - octreotide and PPI gtts   - may need TIPS   - check TEG      PFSH:  No change.    Respiratory:     Pulse Oximetry: 95 %  2 L NC  CXR personally reviewed  CXR with improved edema  Few coarse crackles bilaterally    HemoDynamics:  Pulse: 100, Heart Rate (Monitored): 99  Blood Pressure: 108/97 mmHg, NIBP: 121/66 mmHg     ST    Neuro:  Awake, confused  No focal weakness    Fluids:  Intake/Output       08/02/17 0700 - 08/03/17 0659 08/03/17 0700 - 08/04/17 0659 08/04/17 0700 - 08/05/17 0659      [id number] 6584-7499 Total 5265-9587 1023-3357 Total 1444-5502 0757-6507 Total       Intake    P.O.  --  0 0  --  -- --  --  -- --    P.O. -- 0 0 -- -- -- -- -- --    I.V.  100  -- 100  740  2589 3329  --  -- --    IV Volume (Protonix) -- -- -- 100 300 400 -- -- --    IV Volume (MIVF) -- -- -- 500 1200 1700 -- -- --    IV Volume (Blood ) -- -- -- -- 969 969 -- -- --    IV Piggyback Volume 100 -- 100 100 -- 100 -- -- --    IV Piggyback Volume (Octreotide) -- -- -- 40 120 160 -- -- --    Other  60  60 120  90  60 150  --  -- --    Medications (P.O./ Enteral Liquids) 60 60 120 90 60 150 -- -- --    Enteral  950  710 1660  --  90 90  --  -- --    Enteral Volume 420 280 700 -- 0 0 -- -- --    Free Water / Tube Flush 530 430 960 -- 90 90 -- -- --    Total Intake 3474 861 3864 830 0133 2819 -- -- --       Output    Urine  1500  300 1800  200  350 550  --  -- --    Condom Catheter 8474 248 0777 -- -- -- -- -- --    Number of Times Voided -- -- -- 1 x -- 1 x -- -- --    Number of Times Incontinent of Urine -- -- -- -- 2 x 2 x -- -- --    Straight Catheter -- -- -- 200 -- 200 -- -- --     Void (ml) -- -- -- -- 350 350 -- -- --    Stool/Urine  --  400 400  --  500 500  --  -- --    Measurable Stool (ml) -- 400 400 -- 500 500 -- -- --    Stool  --  -- --  --  -- --  --  -- --    Number of Times Stooled 3 x 2 x 5 x 0 x -- 0 x -- -- --    Total Output 8023 849 7362  -- -- --       Net I/O     -390 70 - 2519 -- -- --        Weight: 101.9 kg (224 lb 10.4 oz)  Recent Labs      17   1425  17   SODIUM   --   146*  145  144   POTASSIUM   --   3.3*  3.6  4.9   CHLORIDE   --   112  111  113*   CO2   --   30  30  27   BUN   --   20  23*  31*   CREATININE   --   0.69  0.68  0.72   MAGNESIUM  1.8   --    --    --    PHOSPHORUS  3.9   --    --    --    CALCIUM   --   8.3*  8.1*  7.6*       GI/Nutrition:  Abd soft ND/NT    Liver Function  Recent Labs      17   ALTSGPT   --    --   18   ASTSGOT   --    --   39   ALKPHOSPHAT   --    --   51   TBILIRUBIN   --    --   3.6*   GLUCOSE  106*  111*  105*       Heme:  Recent Labs      17   17517   RBC  2.71*  2.74*   --    --    --   2.61*   HEMOGLOBIN  8.0*  7.9*   < >  6.8*  6.4*  7.7*   HEMATOCRIT  26.7*  26.8*   < >  22.3*  21.1*  24.9*   PLATELETCT  44*  57*   --    --    --   101*   PROTHROMBTM   --   17.3*   --    --    --    --    INR   --   1.37*   --    --    --    --     < > = values in this interval not displayed.       Infectious Disease:  Temp  Av.8 °C (98.2 °F)  Min: 36.6 °C (97.8 °F)  Max: 36.9 °C (98.4 °F)    Recent Labs      17   0519  17   0008  17   0352   WBC  4.7*  7.9  11.6*   NEUTSPOLYS  59.60  66.80   --    LYMPHOCYTES  23.00  19.60*   --    MONOCYTES  11.50  8.60   --    EOSINOPHILS  4.10  3.40   --    BASOPHILS  0.90  0.60   --    ASTSGOT   --    --   39   ALTSGPT   --    --   18   ALKPHOSPHAT   --    --   51   TBILIRUBIN   --    --    3.6*     Current Facility-Administered Medications   Medication Dose Frequency Provider Last Rate Last Dose   • 0.9 % NaCl with KCl 20 mEq infusion   Continuous Antoni Gamino M.D. 100 mL/hr at 08/03/17 2228     • octreotide (SANDOSTATIN) 1,250 mcg in  mL Infusion  50 mcg/hr Continuous Antoni Gamino M.D. 10 mL/hr at 08/03/17 1419 50 mcg/hr at 08/03/17 1419   • pantoprazole (PROTONIX) 80 mg in  mL Infusion  8 mg/hr Continuous Antoni Gamino M.D. 25 mL/hr at 08/03/17 2228 8 mg/hr at 08/03/17 2228   • lactulose 20 GM/30ML solution 30 mL  30 mL TID Renan Simpson D.O.   Stopped at 08/03/17 2100   • Respiratory Care per Protocol   Continuous RT Norm Ledezma M.D.       • senna-docusate (PERICOLACE or SENOKOT S) 8.6-50 MG per tablet 2 Tab  2 Tab BID Norm Ledezma M.D.   Stopped at 07/31/17 2139    And   • polyethylene glycol/lytes (MIRALAX) PACKET 1 Packet  1 Packet QDAY PRN Norm Ledezma M.D.        And   • magnesium hydroxide (MILK OF MAGNESIA) suspension 30 mL  30 mL QDAY PRN Norm Ledezma M.D.        And   • bisacodyl (DULCOLAX) suppository 10 mg  10 mg QDAY PRN Norm Ledezma M.D.       • ipratropium-albuterol (DUONEB) nebulizer solution 3 mL  3 mL Q2HRS PRN (RT) Norm Ledezma M.D.   3 mL at 07/30/17 2254   • haloperidol lactate (HALDOL) injection 5 mg  5 mg Q6HRS PRN Zach Evans M.D.   5 mg at 07/28/17 0802   • rifaximin (XIFAXAN) tablet 550 mg  550 mg BID Silas Rivera M.D.   550 mg at 08/03/17 1942   • acetaminophen (TYLENOL) tablet 650 mg  650 mg Q4HRS PRN Doron Martinez DDewayneODewayne   650 mg at 08/03/17 1131   • sucralfate (CARAFATE) 1 GM/10ML suspension 1 g  1 g Q6HRS Doron Martinez D.O.   Stopped at 08/04/17 0600   • ondansetron (ZOFRAN) syringe/vial injection 4 mg  4 mg Q4HRS PRN Doron Allison M.D.   4 mg at 07/23/17 0833   • ondansetron (ZOFRAN ODT) dispertab 4 mg  4 mg Q4HRS PRN Doron Allison M.D.   4 mg at 07/23/17 0832   • promethazine (PHENERGAN) tablet 12.5-25 mg  12.5-25  mg Q4HRS PRN Doron Allison M.D.   25 mg at 07/19/17 1025   • promethazine (PHENERGAN) suppository 12.5-25 mg  12.5-25 mg Q4HRS PRN Doron Allison M.D.       • prochlorperazine (COMPAZINE) injection 5-10 mg  5-10 mg Q4HRS PRN Doron Allison M.D.   5 mg at 07/23/17 0832   • glucose 4 g chewable tablet 16 g  16 g Q15 MIN PRN Droon Allison M.D.        And   • dextrose 50% (D50W) injection 25 mL  25 mL Q15 MIN PRN Doron Allison M.D.         Last reviewed on 7/17/2017  5:24 PM by Marli Garcia    Quality  Measures:  Radiology images reviewed, Medications reviewed and Labs reviewed                        Assessment and Plan:    Acute hypoxemic respiratory failure   - cont oxygen  S/P VDRF - liberated 7/30  Acute pulmonary edema   - improved  Acute UGIB secondary to esophageal/gastric varices   - S/P EGD with banding procedure 7/17/2017, repeat 7/26/17   - EGD with sclerotherapy 8/4              - continue on PPI and octreotide   - completed ceftriaxone   - follow Hgb, conservative transfusion strategy   - follow TEG with platelet mapping - transfuse as necessary  Acute blood loss anemia  Hepatic encephalopathy              - Lactulose and rifaximin    Keep in ICU.  Critically ill.  Continued UGI bleeding.  High risk for deterioration and worsening vital organ dysfunction.  May need TIPS if he continues to bleed.    Critical Care Time:  35 minutes  69978  No time overlap  Time excludes procedures  Discussed with RN, RT, Team

## 2017-08-04 NOTE — PROGRESS NOTES
[facility] Hospitalist Progress Note    Date of Service: 2017    Chief Complaint  57 y.o. male admitted 2017 with vomiting blood.     Interval Problem Update  Mr. Lincoln has hx of alcoholic cirrhosis that presented to an upper GI bleed and shock. He has undergone EGD x 2 with banding and had required intubation afterwards.   He has subsequently been extubated. He remains confused though mentation is improving though remains in restraints. He is tolerating tube feeds. He failed his swallow eval x 2 and is on tube feeding.  On 8/3 Mr. Lincoln had a large, bloody BM with hypotension thus was transferred to the ICU. He is experiencing frequent, bloody BMs and is hypotensive. I discussed his condition with his sister about the possibility of a TIPS  He remains in restraints. TEG ordered.  Consultants/Specialty  Critical care  GI  I discussed with Dr. Samano on ICU Hot Rounds and Dr. Moss.  Disposition  ICU        Review of Systems   Unable to perform ROS: mental acuity      Physical Exam  Laboratory/Imaging   Hemodynamics  Temp (24hrs), Av.8 °C (98.2 °F), Min:36.6 °C (97.8 °F), Max:36.9 °C (98.4 °F)   Temperature: 36.6 °C (97.8 °F)  Pulse  Av.9  Min: 64  Max: 139 Heart Rate (Monitored): 99  Blood Pressure: 108/97 mmHg, NIBP: 121/66 mmHg      Respiratory      Respiration: 17, Pulse Oximetry: 95 %     Work Of Breathing / Effort: Shallow  RUL Breath Sounds: Clear, RML Breath Sounds: Diminished, RLL Breath Sounds: Diminished, LESLEY Breath Sounds: Clear, LLL Breath Sounds: Diminished    Fluids    Intake/Output Summary (Last 24 hours) at 17 0745  Last data filed at 17 0600   Gross per 24 hour   Intake   3569 ml   Output   1050 ml   Net   2519 ml       Nutrition  Orders Placed This Encounter   Procedures   • DIET NPO     Standing Status: Standing      Number of Occurrences: 1      Standing Expiration Date:      Order Specific Question:  Restrict to:     Answer:  Strict [1]     Physical Exam    Constitutional: He appears distressed.   HENT:   Head: Normocephalic and atraumatic.   Neck: Neck supple.   Right IJ central line   Cardiovascular: Regular rhythm.    No murmur heard.  tachycardia   Pulmonary/Chest: Effort normal. No respiratory distress.   Abdominal: He exhibits distension.   Mild, diffuse tenderness   Musculoskeletal: He exhibits edema. He exhibits no tenderness.   +edema. In restraints.    Neurological: He is alert.   Moving extremities equally. Confused. Speech is intelligible.    Skin: Skin is dry. No rash noted. He is not diaphoretic. There is pallor.       Recent Labs      08/02/17   0519  08/03/17   0008   08/03/17   1755  08/03/17   2315  08/04/17   0352   WBC  4.7*  7.9   --    --    --   11.6*   RBC  2.71*  2.74*   --    --    --   2.61*   HEMOGLOBIN  8.0*  7.9*   < >  6.8*  6.4*  7.7*   HEMATOCRIT  26.7*  26.8*   < >  22.3*  21.1*  24.9*   MCV  98.5*  97.8   --    --    --   95.4   MCH  29.5  28.8   --    --    --   29.5   MCHC  30.0*  29.5*   --    --    --   30.9*   RDW  90.8*  90.9*   --    --    --   80.3*   PLATELETCT  44*  57*   --    --    --   101*   MPV  13.0*  12.6   --    --    --   12.5    < > = values in this interval not displayed.     Recent Labs      08/02/17   0519 08/03/17   0008  08/04/17   0352   SODIUM  146*  145  144   POTASSIUM  3.3*  3.6  4.9   CHLORIDE  112  111  113*   CO2  30  30  27   GLUCOSE  106*  111*  105*   BUN  20  23*  31*   CREATININE  0.69  0.68  0.72   CALCIUM  8.3*  8.1*  7.6*     Recent Labs      08/03/17   0008   INR  1.37*                  Assessment/Plan     * Hypovolemic shock (CMS-HCC) (present on admission)  Assessment & Plan  - IV  Levophed, RBCs  - pt was having significant bleeding from upper GI tract with melena.   - restart IV fluids.      Acute gastrointestinal hemorrhage (present on admission)  Assessment & Plan  - initially resolved with EGD and banding 7/17 and 7/26. Octreotide drip  restarted as well as an IV nexium drip.    Pfau attempting further esophageal banding.  He may require a TIPS procedure.     ARF (acute renal failure) (CMS-HCC) (present on admission)  Assessment & Plan  - likely ATN resolved with fluids    Acute blood loss anemia (present on admission)  Assessment & Plan  Hb has remained stable at 8. Hold on serial lab draws.   - received iron     Cirrhosis (CMS-HCC) (present on admission)  Assessment & Plan  Alchoholic  - with hepatic encephalopathy, mentation has improved but he remains encephalopathic.   - continue rifaximin and lactulose     Schizophrenia, unspecified type (present on admission)  Assessment & Plan  - pt will need psych consult at some point when more alert and verbal     Alcoholism (CMS-HCC)  Assessment & Plan    - monitor for further detox     Low serum magnesium level  Assessment & Plan  - resolved     Hypokalemia  Assessment & Plan  - resolved     Acute respiratory failure (CMS-HCC)  Assessment & Plan  - remained intubated post EGD  - extubated on 7/30      Hypernatremia (present on admission)  Assessment & Plan  Free water added via cortrak.      Encephalopathy acute (present on admission)  Assessment & Plan  Likely from detox and hepatic encephalopathy.       Labs reviewed and Medications reviewed        DVT Prophylaxis: Contraindicated - High bleeding risk  DVT prophylaxis - mechanical: SCDs              Pt is critically ill in the ICU, 35 minutes of critical care time spent with patient and nursing and in specific management of GI bleed in the ICU with hypotension not including procedures. See orders for IV pressors.

## 2017-08-04 NOTE — PROGRESS NOTES
Jey from Lab called with critical result of H&H at 6.8 & 22.3. Critical lab result read back to Jey.   Dr. Gamino notified of critical lab result at 1842.  Critical lab result read back by Dr. Gamino.

## 2017-08-04 NOTE — PROGRESS NOTES
Dr. Cuellar updated about pt's current hemoglobin and active bleeding. Per MD Cuellar's request Dr. Gold notified of Dr. Cuellar's suggestion of possible TIPS procedure. Orders to transfuse pt 1 unit of PRBC's and 1 unit of platelets received and implemented.

## 2017-08-04 NOTE — PROGRESS NOTES
Gastroenterology Progress Note                                            Date & Time Created: 8/4/2017 1:34 PM   Attending; Dr Jackson Moss  Resident: Stephany Siegel    Interval History:  No new complaints. No abdominal pain, another episode of LA bleeding last night       Review of Systems:  Review of Systems   Constitutional: Negative for fever and chills.   Respiratory: Negative.    Cardiovascular:        Stable HD   Gastrointestinal: Positive for blood in stool.        Ascites moderate   Skin: Negative for rash.       Physical Exam:  Physical Exam   Constitutional: He appears well-developed. No distress. He is sedated and not intubated.   HENT:   Head: Atraumatic.   Neck: No tracheal deviation present.   Cardiovascular: Normal rate and regular rhythm.    Pulmonary/Chest: Effort normal. No accessory muscle usage. He is not intubated. No respiratory distress. He has no wheezes.   Abdominal: Soft. He exhibits distension. There is no tenderness. There is no rebound and no guarding.   Neurological: He is alert.   Oriented to self and place only   Skin: Skin is warm and dry.   Nursing note and vitals reviewed.      Labs:        Invalid input(s): [id number]      Recent Labs      08/01/17   1425  08/02/17 0519 08/03/17 0008  08/04/17   0352   SODIUM   --   146*  145  144   POTASSIUM   --   3.3*  3.6  4.9   CHLORIDE   --   112  111  113*   CO2   --   30  30  27   BUN   --   20  23*  31*   CREATININE   --   0.69  0.68  0.72   MAGNESIUM  1.8   --    --    --    PHOSPHORUS  3.9   --    --    --    CALCIUM   --   8.3*  8.1*  7.6*     Recent Labs      08/02/17   0519 08/03/17   0008  08/04/17   0352   ALTSGPT   --    --   18   ASTSGOT   --    --   39   ALKPHOSPHAT   --    --   51   TBILIRUBIN   --    --   3.6*   GLUCOSE  106*  111*  105*     Recent Labs      08/02/17   0519  08/03/17   0008   08/03/17   2315  08/04/17   0352  08/04/17   0846   RBC  2.71*  2.74*   --    --   2.61*   --    HEMOGLOBIN  8.0*   7.9*   < >  6.4*  7.7*  7.9*   HEMATOCRIT  26.7*  26.8*   < >  21.1*  24.9*  24.7*   PLATELETCT  44*  57*   --    --   101*   --    PROTHROMBTM   --   17.3*   --    --    --    --    INR   --   1.37*   --    --    --    --     < > = values in this interval not displayed.     Recent Labs      17   0519  17   0008  17   0352   WBC  4.7*  7.9  11.6*   NEUTSPOLYS  59.60  66.80   --    LYMPHOCYTES  23.00  19.60*   --    MONOCYTES  11.50  8.60   --    EOSINOPHILS  4.10  3.40   --    BASOPHILS  0.90  0.60   --    ASTSGOT   --    --   39   ALTSGPT   --    --   18   ALKPHOSPHAT   --    --   51   TBILIRUBIN   --    --   3.6*     Hemodynamics:  Temp (24hrs), Av.8 °C (98.2 °F), Min:36.6 °C (97.8 °F), Max:36.9 °C (98.4 °F)  Temperature: 36.8 °C (98.2 °F)  Pulse  Av.2  Min: 64  Max: 139Heart Rate (Monitored): (!) 101  Blood Pressure: 108/97 mmHg, NIBP: 110/68 mmHg     Respiratory:    Respiration: (!) 0, Pulse Oximetry: 96 %     Work Of Breathing / Effort: Shallow  RUL Breath Sounds: Clear, RML Breath Sounds: Diminished, RLL Breath Sounds: Diminished, LESLEY Breath Sounds: Clear, LLL Breath Sounds: Diminished  Fluids:    Intake/Output Summary (Last 24 hours) at 17 1346  Last data filed at 17 0800   Gross per 24 hour   Intake   1165 ml   Output    465 ml   Net    700 ml     Weight: 101.9 kg (224 lb 10.4 oz)  GI/Nutrition:  Orders Placed This Encounter   Procedures   • DIET NPO     Standing Status: Standing      Number of Occurrences: 1      Standing Expiration Date:      Order Specific Question:  Restrict to:     Answer:  Strict [1]     Medical Decision Making, by Problem:  Active Hospital Problems    Diagnosis   • Acute gastrointestinal hemorrhage: Recurrent variceal hemorrhage s/p 2 endoscopies w/ banding.   • ARF (acute renal failure): Resolving.    • Acute blood loss anemia: Stable.    • Encephalopathy   • Alcoholism (CMS-HCC) [F10.20]   • Cirrhosis (CMS-HCC) [K74.60]       Plan:  Continue  PPI/Octreotide infusions.  Serial H and H, transfuse if < 7   NPO  Repeat EGD today           Labs reviewed, Radiology images reviewed and Medications reviewed

## 2017-08-04 NOTE — CARE PLAN
Problem: Safety  Goal: Will remain free from falls  Intervention: Assess risk factors for falls    08/03/17 0800 08/03/17 2000   OTHER   Fall Risk --  High Risk to Fall - 2 or more points    Risk for Injury-Any positive answers results in the pt being at high risk for fall related injury --  Alcohol Abuse   Mobility Status Assessment --  2-2 Healthcare Providers Required for Assistance with Ambulation & Transfer   History of fall --  2   Date of Last Fall 07/16/17 --    Pt Calls for Assistance --  Yes       Intervention: Implement fall precautions    08/02/17 1953 08/03/17 2000   OTHER   Environmental Precautions --  Treaded Slipper Socks on Patient;Personal Belongings, Wastebasket, Call Bell etc. in Easy Reach;Bed in Low Position;Report Given to Other Health Care Providers Regarding Fall Risk;Communication Sign for Patients & Families;Mobility Assessed & Appropriate Sign Placed   IV Pole on Same Side of Bed as Bathroom (saline locked) --    Bedrails --  Alternative to Bedrails Used   Chair/Bed Strip Alarm --  Yes - Alarm On   Bed Alarm (Built in - for ICU ONLY) --  Yes - Alarm On           Problem: Skin Integrity  Goal: Risk for impaired skin integrity will decrease  Intervention: Implement precautions to protect skin integrity in collaboration with the interdisciplinary team    08/02/17 1953 08/03/17 2000   OTHER   Skin Preventative Measures --  Pillows in Use to Float Heels;Heel Float Boots in Use    Bed Types --  Low Air Loss Mattress   Friction Interventions --  Draw Sheet / Pad Used for Repositioning   Protocols --  Pressure Ulcer Prevention / Intervention Protocol in Place   Moisturizers --  Moisturizer ;Barrier Wipes   Containment Devices (condom cath) --    Activity  --  Bed;Range of motion   Patient Turns / Repositioning --  Left Side   Assistance / Tolerance for Turning/Repositioning --  Assistance of Two or More   Patient is Receiving Nutrition --  Tube Feeding Nutrition   Nutrition Consult Ordered --   No, Consult has Already been Placed   Vitamin Therapy in Use --  No

## 2017-08-05 NOTE — PROGRESS NOTES
Gastroenterology Progress Note     Author: Jackson Popantelmo   Date & Time Created: 8/5/2017 10:24 AM    Interval History:  EGD yesterday with injection of sclerosant into actively bleeding esophageal varices due to inability to attach standard bands.    Patient has had no bleeding since procedure, Hgb has been stable (now 7.8) and has not received transfusion since EGD.  Overall condition worsened with respiratory distress, leukocytosis, acute on chronic worsening of LFTs.    Review of Systems:  Review of Systems   Unable to perform ROS: medical condition       Physical Exam:  Physical Exam   Cardiovascular:   Heart rate ~120   Pulmonary/Chest:   Labored breathing, on O2 by facemask   Abdominal: Soft.       Labs:        Invalid input(s): [id number]      Recent Labs      08/03/17 0008 08/04/17 0352 08/05/17   0540   SODIUM  145  144  148*   POTASSIUM  3.6  4.9  5.2   CHLORIDE  111  113*  116*   CO2  30  27  12*   BUN  23*  31*  42*   CREATININE  0.68  0.72  1.24   MAGNESIUM   --    --   1.8   PHOSPHORUS   --    --   4.5   CALCIUM  8.1*  7.6*  8.1*     Recent Labs      08/03/17 0008 08/04/17 0352 08/05/17   0540   ALTSGPT   --   18  160*   ASTSGOT   --   39  350*   ALKPHOSPHAT   --   51  43   TBILIRUBIN   --   3.6*  6.8*   GLUCOSE  111*  105*  71     Recent Labs      08/03/17 0008 08/04/17 0352 08/04/17   2030  08/04/17 2355  08/05/17   0540   RBC  2.74*   --   2.61*   --    --    --   2.52*   HEMOGLOBIN  7.9*   < >  7.7*   < >  7.7*  7.1*  7.8*   HEMATOCRIT  26.8*   < >  24.9*   < >  24.8*  22.8*  24.2*   PLATELETCT  57*   --   101*   --    --    --   154*   PROTHROMBTM  17.3*   --    --    --    --    --    --    INR  1.37*   --    --    --    --    --    --     < > = values in this interval not displayed.     Recent Labs      08/03/17 0008 08/04/17 0352 08/05/17   0540   WBC  7.9  11.6*  32.1*   NEUTSPOLYS  66.80   --   95.50*   LYMPHOCYTES  19.60*   --   0.00*   MONOCYTES  8.60   --    4.50   EOSINOPHILS  3.40   --   0.00   BASOPHILS  0.60   --   0.00   ASTSGOT   --   39  350*   ALTSGPT   --   18  160*   ALKPHOSPHAT   --   51  43   TBILIRUBIN   --   3.6*  6.8*     Hemodynamics:  Temp (24hrs), Av.4 °C (97.5 °F), Min:36.1 °C (97 °F), Max:36.8 °C (98.2 °F)  Temperature: 36.3 °C (97.3 °F), Monitored Temp: 36.6 °C (97.9 °F)  Pulse  Av.7  Min: 32  Max: 139Heart Rate (Monitored): (!) 137  Blood Pressure: (!) 92/52 mmHg, NIBP: 103/57 mmHg  CVP (mm Hg): (!) 7 MM HG  Respiratory:    Respiration: (!) 33, Pulse Oximetry: 92 %     Work Of Breathing / Effort: Shallow  RUL Breath Sounds: Coarse Crackles;Expiratory Wheezes, RML Breath Sounds: Diminished, RLL Breath Sounds: Diminished, LESLEY Breath Sounds: Coarse Crackles;Expiratory Wheezes, LLL Breath Sounds: Diminished  Fluids:    Intake/Output Summary (Last 24 hours) at 17 1024  Last data filed at 17 0600   Gross per 24 hour   Intake   4883 ml   Output    495 ml   Net   4388 ml     Weight: 108.4 kg (238 lb 15.7 oz)  GI/Nutrition:  Orders Placed This Encounter   Procedures   • DIET NPO     Standing Status: Standing      Number of Occurrences: 1      Standing Expiration Date:      Order Specific Question:  Restrict to:     Answer:  Strict [1]     Medical Decision Making, by Problem:  Active Hospital Problems    Diagnosis   • *Hypovolemic shock (CMS-HCC) [R57.1]   • Acute gastrointestinal hemorrhage [K92.2]   • ARF (acute renal failure) (CMS-HCC) [N17.9]   • Acute blood loss anemia [D62]   • Encephalopathy acute [G93.40]   • Hypernatremia [E87.0]   • Acute respiratory failure (CMS-HCC) [J96.00]   • Hypokalemia [E87.6]   • Low serum magnesium level [E83.42]   • Alcoholism (CMS-HCC) [F10.20]   • Schizophrenia, unspecified type [F20.9]   • Cirrhosis (CMS-HCC) [K74.60]       Plan:  Despite cessation of bleeding, overall worsening of clinical condition.  Understand per nursing that patient to be made DNR by family and no further aggressive plans are  contemplated.  Will follow    Core Measures

## 2017-08-05 NOTE — CARE PLAN
Problem: Skin Integrity  Goal: Risk for impaired skin integrity will decrease  Intervention: Implement precautions to protect skin integrity in collaboration with the interdisciplinary team  Turning q2hrs and skin precautions in effect as charted.       Problem: Urinary Elimination:  Goal: Ability to reestablish a normal urinary elimination pattern will improve  Intervention: Evaluate need to continue indwelling urinary catheter  Need for catheter re-evaluated. Monitoring strict I/O

## 2017-08-05 NOTE — OR NURSING
EGD started 1647. 1729 EGD stopped and central line placed. 1753 EGD restarted. 1800 EGD finished. See MD notes

## 2017-08-05 NOTE — PROCEDURES
Procedure: central line    Indication: hypotension    Consent: emergent, 2 physicians will sign    Description: a time-out was called. The right neck was prepped and draped in a sterile fashion.  Using sterile ultrasound guidance, the right internal jugular vein was identified. 2 mL of 1% lidocaine was used for anesthesia.  A large bore needle was used to aspirate dark, red, non-pulsating blood. Seldinger technique was used to place the central venous catheter. All three ports were aspirated and flushed with sterile saline.  The central line was adhered to the skin with sutures.     Blood loss: 1 mL    A stat chest x-ray is pending at this time.

## 2017-08-05 NOTE — PROGRESS NOTES
1930: Pt's central line found to be out of place. Dr. Kim was notified as he was at nurses' station.    2000: Informed consent obtained from pt's mother. New central line placed by Dr. Kim.    Pt tolerated procedure well.

## 2017-08-05 NOTE — OP REPORT
DATE OF SERVICE:  08/04/2017    EGD with hemostasis.    INDICATION FOR PROCEDURE:  Recurrent variceal bleeding.    CONSENT:  Informed consent was obtained from the patient's family after   benefits, risks and possible alternatives were discussed.    MEDICATIONS:  The patient received 100 mcg of fentanyl and 5 mg of Versed   throughout the procedure.    PROCEDURE DESCRIPTION:  The patient was placed in the left lateral decubitus   position and provided with supplemental oxygen via nasal cannula.  His vital   signs were monitored continuously throughout the procedure.  When ready, the   upper endoscope was placed in the patient's mouth and advanced easily and   carefully to the esophagus and subsequently the stomach and duodenum.  There   was fresh blood presents in the esophagus.  Grade II esophageal varices were   noted, along with ulcerations present consistent with recent banding.    Additionally, at one of these locations, there was a visible vessel versus   platelet plug present identified at 35 cm from the incisors.  Otherwise, the   stomach and duodenum appeared normal other than the presence of blood in those   locations as well in relatively small amounts.  The scope was then withdrawn   and banding equipment was affixed.  The scope was then replaced back into the   esophagus, and the lesion of 35 cm was directly targeted and banded.  The   tissue was scarred, and sufficient tissue could not be suctioned into the   banding cap, thus banding ultimately failed.  This was repeated several times,   each with the band immediately falling off the tissue.  Banding was attempted   immediately inferior to this lesion and this was successful with 1 band,   though tissue purchase was poor, and this band eventually fell off   spontaneously.  A total of 20 bands were attempted to be placed directly at   this bleeding lesion which was actively bleeding during these attempts, and   around the bleeding lesion.  It was  decided to proceed with ethanolamine   injections and banding had been unsuccessful.  A total of 10 mL of   ethanolamine was injected in 1-2 mL aliquots directly into the location of the   platelet plug, as well as directly around this location into the varices   above and below into the side of the actively bleeding location.  With this   injection, there was complete resolution of bleeding.  The scope was then   withdrawn.    COMPLICATIONS:  No complications during or in the immediate postoperative   period.    IMPRESSION:  Actively bleeding esophageal varices as described above.    RECOMMENDATION:  As the tissue has scar down with previous bandings, placement   of bands despite multiple attempts as described above were also unsuccessful,   thus necessitating injection of a sclerosant.  I would recommend following   this patient very carefully.  Continuing him on octreotide, transfuse p.r.n.    If we believe that he continues to be actively bleeding despite the previous   bandings and the injection of the sclerosant on this occasion, then the   patient should be referred for urgent TIPS procedure.       ____________________________________     MD MELISSA CASTELLON / NELI    DD:  08/04/2017 18:12:00  DT:  08/04/2017 18:59:15    D#:  [id number]  Job#:  [id number]

## 2017-08-05 NOTE — CARE PLAN
Problem: Safety  Goal: Will remain free from falls  Intervention: Implement fall precautions  Fall precautions in effect and explained to patient as charted      Problem: Venous Thromboembolism (VTW)/Deep Vein Thrombosis (DVT) Prevention:  Goal: Patient will participate in Venous Thrombosis (VTE)/Deep Vein Thrombosis (DVT)Prevention Measures  Intervention: Ensure patient wears graduated elastic stockings (ANDRES hose) and/or SCDs, if ordered, when in bed or chair (Remove at least once per shift for skin check)  Encouraging / reinforcing use of bilat SCD

## 2017-08-05 NOTE — CARE PLAN
Problem: Fluid Volume:  Goal: Will maintain balanced intake and output  Outcome: PROGRESSING AS EXPECTED  Allan inserted per MD order to monitor  I/Os. IVF in place.    Problem: Bowel/Gastric:  Goal: Will show no signs and symptoms of gastrointestinal bleeding  Outcome: PROGRESSING SLOWER THAN EXPECTED  Bowel sounds assessed q4h. NPO per MD order related to emesis and egd today. Q6h hemoglobins per MD order. Notifying Md of any signs of bleeding.

## 2017-08-05 NOTE — PROGRESS NOTES
Dr. Ledezma notified of pt's continued urine output, bladder scan results, and that RN has flushed medellin. Continue plan of care.

## 2017-08-05 NOTE — OR SURGEON
Operative Report    PreOp Diagnosis: esophageal variceal bleeding    PostOp Diagnosis: esophageal variceal bleeding    Procedure(s):  GASTROSCOPY-ENDO - Wound Class: Clean    Surgeon(s):  Jackson Moss M.D.    Anesthesiologist/Type of Anesthesia:  No anesthesia staff entered./ fentanyl 100 mcg  Versed 5 mg    Surgical Staff:  Endoscopy Technician: Yoon Arriola  Sedation/Monitoring Nurse: Patricia Resendiz R.N.    Specimens:  none    Estimated Blood Loss: 100 cc    Findings: sclerosed, actively bleeding esophageal varices.  Unable to band further due to previously scarred variceal tissue.  Treated with ethanolamine, no active bleeding at conclusion of procedure    Complications: none    Plan:  Follow, transfuse PRN.  If repeat bleeding, then will need urgent TIPS.        8/4/2017 6:02 PM Jackson Moss

## 2017-08-05 NOTE — PROGRESS NOTES
Call received from Dr. Ledezma. Updated on pt's low urine output and low CVP. Order to transfuse 1PRBC received and order for 500ml bolus NS.

## 2017-08-05 NOTE — PROGRESS NOTES
Cecilia, sister, was given update on patient status and events over night via telephone. All other family members have not been able to be reached. Cecilia stated she has communicated with patient's son and mother. Cecilia stated the consensus between family members was not to prolong medical treatment but asked to wait for an official decision regarding continuation and/or ceasing medical treatment. Patient's son is expected to arrive from the [facility] at 0915 as per Cecilia.   Patient's worsening status and marked increase work of breathing was addressed with Dr Putnam and Dr Gamino during rounds. Dr Gamino in to assess patient. Both MD's agreed to speak to family members upon their arrival.

## 2017-08-05 NOTE — PROGRESS NOTES
Pulmonary Critical Care Progress Note        Date of service:  8/5/2017      Interval Events:  24 hour interval history reviewed  Reason for visit:  Respiratory failure, acute pulmonary edema, acute blood loss anemia, coagulopathy        - ST   - octreotide and PPI gtt   - 2 L NC   - CXR with increased edema   - start Unasyn for aspiration      PFSH:  No change.    Respiratory:     Pulse Oximetry: 99 %  2 L NC  CXR personally reviewed  CXR with increased edema  Coarse crackles bilaterally - worse  RR 30    HemoDynamics:  Pulse: (!) 122, Heart Rate (Monitored): (!) 120  Blood Pressure: (!) 92/52 mmHg, NIBP: (!) 83/53 mmHg  CVP (mm Hg): (!) 8 MM HG  ST    Neuro:  Lethargic    Fluids:  Intake/Output       08/03/17 0700 - 08/04/17 0659 08/04/17 0700 - 08/05/17 0659 08/05/17 0700 - 08/06/17 0659      0750-5401 8948-7818 Total 8479-6868 8499-5804 Total 1565-1636 3530-5065 Total       Intake    I.V.  740  2559 3299  1320  3120 4440  --  -- --    IV Volume (Protonix) 100 270 370 120 300 420 -- -- --    IV Volume (MIVF) 500 1200 1700 1200 1200 2400 -- -- --    IV Volume (Blood ) -- 969 969 -- -- -- -- -- --    IV Volume (NS) -- -- -- -- 1500 1500 -- -- --    IV Piggyback Volume 100 -- 100 -- -- -- -- -- --    IV Piggyback Volume (Octreotide) 40 120 160 -- 120 120 -- -- --    Blood  --  -- --  311  572 883  --  -- --    Volume (RELEASE RED BLOOD CELLS) -- -- -- 311 -- 311 -- -- --    Volume (RELEASE PLATELET PHERESIS) -- -- -- -- 222 222 -- -- --    Volume (RELEASE RED BLOOD CELLS) -- -- -- -- 350 350 -- -- --    Other  90  60 150  --  -- --  --  -- --    Medications (P.O./ Enteral Liquids) 90 60 150 -- -- -- -- -- --    Enteral  --  90 90  0  -- 0  --  -- --    Enteral Volume -- 0 0 0 -- 0 -- -- --    Free Water / Tube Flush -- 90 90 -- -- -- -- -- --    Total Intake 857 6164 3535 7790 3696 5323 -- -- --       Output    Urine  200  350 550  200  295 495  --  -- --    Number of Times Voided 1 x -- 1 x -- -- -- -- -- --     Number of Times Incontinent of Urine -- 2 x 2 x 1 x -- 1 x -- -- --    Indwelling Cathether -- -- -- -- 295 295 -- -- --    Straight Catheter 200 -- 200 -- -- -- -- -- --    Void (ml) -- 350 350 200 -- 200 -- -- --    Stool/Urine  --  500 500  --  -- --  --  -- --    Measurable Stool (ml) -- 500 500 -- -- -- -- -- --    Stool  --  -- --  --  -- --  --  -- --    Number of Times Stooled 0 x -- 0 x -- -- -- -- -- --    Total Output  200 295 495 -- -- --       Net I/O     630 1859 1539 9131 2954 3796 -- -- --        Weight: 108.4 kg (238 lb 15.7 oz)  Recent Labs      17   0540   SODIUM  145  144  148*   POTASSIUM  3.6  4.9  5.2   CHLORIDE  111  113*  116*   CO2  30  27  12*   BUN  23*  31*  42*   CREATININE  0.68  0.72  1.24   MAGNESIUM   --    --   1.8   PHOSPHORUS   --    --   4.5   CALCIUM  8.1*  7.6*  8.1*       GI/Nutrition:  Abd soft ND/NT    Liver Function  Recent Labs      17   0540   ALTSGPT   --   18  160*   ASTSGOT   --   39  350*   ALKPHOSPHAT   --   51  43   TBILIRUBIN   --   3.6*  6.8*   GLUCOSE  111*  105*  71       Heme:  Recent Labs      17   00017   2030  17   2355  17   0540   RBC  2.74*   --   2.61*   --    --    --   2.52*   HEMOGLOBIN  7.9*   < >  7.7*   < >  7.7*  7.1*  7.8*   HEMATOCRIT  26.8*   < >  24.9*   < >  24.8*  22.8*  24.2*   PLATELETCT  57*   --   101*   --    --    --   154*   PROTHROMBTM  17.3*   --    --    --    --    --    --    INR  1.37*   --    --    --    --    --    --     < > = values in this interval not displayed.       Infectious Disease:  Temp  Av.4 °C (97.6 °F)  Min: 36.1 °C (97 °F)  Max: 36.8 °C (98.2 °F)  Monitored Temp  Av.9 °C (96.7 °F)  Min: 34.7 °C (94.5 °F)  Max: 36.4 °C (97.5 °F)    Recent Labs      17   0008  17   0352  17   0540   WBC  7.9  11.6*  32.1*   NEUTSPOLYS  66.80   --   95.50*    LYMPHOCYTES  19.60*   --   0.00*   MONOCYTES  8.60   --   4.50   EOSINOPHILS  3.40   --   0.00   BASOPHILS  0.60   --   0.00   ASTSGOT   --   39  350*   ALTSGPT   --   18  160*   ALKPHOSPHAT   --   51  43   TBILIRUBIN   --   3.6*  6.8*     Current Facility-Administered Medications   Medication Dose Frequency Provider Last Rate Last Dose   • norepinephrine (LEVOPHED) 8 mg in  mL Infusion  0.5-30 mcg/min Continuous Antoni Gamino M.D.   Stopped at 08/04/17 1730   • ethanolamine (ETHAMOLIN) 5 % injection 1-10 mL  1-10 mL Once Jackson Moss M.D.       • lactated ringers infusion   Continuous Doron Watt M.D. 100 mL/hr at 08/05/17 0556     • octreotide (SANDOSTATIN) 1,250 mcg in  mL Infusion  50 mcg/hr Continuous Antoni Gamino M.D. 10 mL/hr at 08/04/17 2042 50 mcg/hr at 08/04/17 2042   • pantoprazole (PROTONIX) 80 mg in  mL Infusion  8 mg/hr Continuous Antoni Gamino M.D. 25 mL/hr at 08/05/17 0556 8 mg/hr at 08/05/17 0556   • lactulose 20 GM/30ML solution 30 mL  30 mL TID Renan Simpson D.O.   Stopped at 08/04/17 2100   • Respiratory Care per Protocol   Continuous RT Norm Ledezma M.D.       • senna-docusate (PERICOLACE or SENOKOT S) 8.6-50 MG per tablet 2 Tab  2 Tab BID Norm Ledezma M.D.   Stopped at 07/31/17 2139    And   • polyethylene glycol/lytes (MIRALAX) PACKET 1 Packet  1 Packet QDAY PRN Norm Ledezma M.D.        And   • magnesium hydroxide (MILK OF MAGNESIA) suspension 30 mL  30 mL QDAY PRN Norm Ledezma M.D.        And   • bisacodyl (DULCOLAX) suppository 10 mg  10 mg QDAY PRN Norm Ledezma M.D.       • ipratropium-albuterol (DUONEB) nebulizer solution 3 mL  3 mL Q2HRS PRN (RT) Norm Ledezma M.D.   3 mL at 07/30/17 2254   • haloperidol lactate (HALDOL) injection 5 mg  5 mg Q6HRS PRN Zach Evans M.D.   5 mg at 07/28/17 0802   • rifaximin (XIFAXAN) tablet 550 mg  550 mg BID Silas Rivera M.D.   Stopped at 08/04/17 2100   • acetaminophen (TYLENOL) tablet 650  mg  650 mg Q4HRS PRN Doron Martinez D.O.   650 mg at 08/03/17 1131   • sucralfate (CARAFATE) 1 GM/10ML suspension 1 g  1 g Q6HRS Doron Martinez D.O.   Stopped at 08/05/17 0000   • ondansetron (ZOFRAN) syringe/vial injection 4 mg  4 mg Q4HRS PRN Doron Allison M.D.   4 mg at 07/23/17 0833   • ondansetron (ZOFRAN ODT) dispertab 4 mg  4 mg Q4HRS PRN Doron Allison M.D.   4 mg at 07/23/17 0832   • promethazine (PHENERGAN) tablet 12.5-25 mg  12.5-25 mg Q4HRS PRN Doron Allison M.D.   25 mg at 07/19/17 1025   • promethazine (PHENERGAN) suppository 12.5-25 mg  12.5-25 mg Q4HRS PRN Doron Allison M.D.       • prochlorperazine (COMPAZINE) injection 5-10 mg  5-10 mg Q4HRS PRN Doron Allison M.D.   5 mg at 07/23/17 0832   • glucose 4 g chewable tablet 16 g  16 g Q15 MIN PRN Doron Allison M.D.        And   • dextrose 50% (D50W) injection 25 mL  25 mL Q15 MIN PRN Doron Allison M.D.         Last reviewed on 7/17/2017  5:24 PM by Marli Garcia    Quality  Measures:  Radiology images reviewed, Medications reviewed and Labs reviewed                        Assessment and Plan:    Acute hypoxemic respiratory failure   - cont oxygen  S/P VDRF - liberated 7/30  Acute pulmonary edema  Aspiration pneumonia   - start Unasyn  Acute UGIB secondary to esophageal/gastric varices   - S/P EGD with banding procedure 7/17/2017, repeat 7/26/17   - EGD with sclerotherapy 8/4              - continue on PPI and octreotide   - completed ceftriaxone   - follow Hgb, conservative transfusion strategy   - follow TEG with platelet mapping - transfuse as necessary  Acute blood loss anemia  Hepatic encephalopathy              - Lactulose and rifaximin    Worsening pulmonary status today.  Prognosis is very poor.  Likely has aspirated.  High risk for deterioration and worsening vital organ dysfunction.    16:00 hours:  Goals of care discussions were held with family.  Prognosis is grave.  Family decided to transition to comfort care.  This is  entirely appropriate given this gentleman's very poor prognosis.    Critical Care Time:  33 minutes  [id number]  No time overlap  Time excludes procedures  Discussed with RN, RT, Team

## 2017-08-05 NOTE — OP REPORT
DATE OF SERVICE:  08/04/2017    TITLE OF THE PROCEDURE:  Central venous catheter placement.    INDICATION FOR THE PROCEDURE:  A gentleman with acute upper gastrointestinal   hemorrhage with hypotension and acute blood loss anemia requires central   venous access for blood product medication administration.    NARRATIVE:  The right neck was prepped with chlorhexidine and draped in usual   sterile fashion.  Xylocaine 1% solution was used for topical anesthesia.  A   triple lumen central venous catheter was easily placed into the right internal   jugular vein under ultrasound guidance on the first attempt using the   technique described by Emilie without difficulty or apparent complication.    The line was sutured into place and a sterile dressing was placed over the   line.  All 3 ports flushed and returned venous blood easily.  The patient   tolerated the procedure quite nicely.  No complications are apparent.  A chest   x-ray will be obtained in the next few minutes to confirm placement.       ____________________________________     MD ULISES NAVARRO / NELI    DD:  08/04/2017 20:21:07  DT:  08/04/2017 20:35:32    D#:  [id number]  Job#:  [id number]

## 2017-08-06 NOTE — DISCHARGE SUMMARY
DATE OF ADMISSION:  2017    DATE OF DEATH:  2017    CAUSE OF DEATH:  Hypovolemic shock secondary to upper gastrointestinal   hemorrhage secondary to esophageal varices secondary to cirrhosis secondary to   alcohol abuse.    MAJOR COMORBID CONDITIONS:  1.  Acute respiratory failure.  2.  Schizophrenia.  3.  Alcoholism.  4.  Acute severe hepatic encephalopathy.  5.  Hepatic coma.  6.  Severe protein-calorie malnutrition.  7.  Chronic obstructive pulmonary disease.  8.  Tobacco dependence.    CONSULTATIONS:  Dr. Ledezma, pulmonology was consulted, [facility], Dr. Moss was consulted.    PROCEDURES:  He underwent an EGD by Dr. Moss on 2017 showing esophageal   varices, which were banded.  He then underwent a second EGD on 2017    esophageal banding by Dr. Mcdaniel.  He underwent an EGD on 2015 by Dr. Moss with injection of a sclerosant.    HOSPITAL COURSE:  On 2017, this 57-year-old male with history of   significant ongoing alcohol use and alcohol-induced cirrhosis was admitted   with upper GI bleed.  He underwent the aforementioned procedures and was then   kept in intensive care unit, continued to bleed.  On 2015, he underwent   the aforementioned EGD with ongoing severe blood loss.  Today, he remained   extremely encephalopathic.  I met with his brother, who is his caretaker as   well as his sister and his 2 sons.  Decision was made not to undergo a TIPS   Procedure with a worsening of his mentation, therefore he was taken off   intravenous pressors, fluids and octreotide and Protonix and comfort care   measures were carried out and he  in comfort and with dignity.    Thirty five minutes were spent on death arrangements on the day of death.       ____________________________________     MD JEANNETTE MOORE / NELI    DD:  2017 19:29:48  DT:  2017 21:34:46    D#:  [id number]  Job#:  [id number]

## 2017-08-28 NOTE — PROCEDURES
DATE OF SERVICE:  07/17/2017    NAME OF PROCEDURE:  Left subclavian central line insertion.    INDICATIONS:  Acute gastrointestinal hemorrhage with need for central venous   access.    DESCRIPTION OF PROCEDURE:  After consents were obtained, he was placed in   supine position.  The left chest wall was prepped and draped in a sterile   fashion using chlorhexidine.  A 5 mL of 1% lidocaine were used for local   anesthesia.  Left subclavian vein was then cannulated and line introduced   through the Seldinger technique without difficulty.  The line was secured via   sutures and protected with a Biopatch.    ESTIMATED BLOOD LOSS:  None.    COMPLICATIONS:  None.       ____________________________________     MD BE MEJÍA / NELI    DD:  08/28/2017 10:13:07  DT:  08/28/2017 10:20:46    D#:  [id number]  Job#:  [id number]

## 2017-09-12 NOTE — OP REPORT
DATE OF SERVICE:  08/04/2017    PROCEDURE:  Upper endoscopy with esophageal variceal banding.    CONSENT:  Informed consent was obtained directly from the patient after   benefits, risks and possible alternatives were discussed.    MEDICATIONS:  Patient received 100 mcg of fentanyl and 5 mg of Versed given   throughout the procedure.    PROCEDURE DESCRIPTION:  The patient was placed in the left lateral decubitus   position and was provided with supplemental oxygen via nasal cannula.  His   vital signs were monitored continuously throughout the procedure.  When ready,   the upper endoscope was placed in the patient's mouth and advanced easily and   carefully to the esophagus and subsequently to the stomach and duodenum.    FINDINGS:  The patient had actively bleeding esophageal varices.  Multiple   attempts were made to band these varices; however, because the varices had   been scarred from previous bandings, placement of bands was not possible,   despite multiple attempts.  Because of this, ethanolamine was then injected   into the actively bleeding varices, with complete resolution of bleeding at   the conclusion of the procedure.    COMPLICATIONS:  No complications during or in the immediate postoperative   period.    IMPRESSION:  1.  Scarred down actively bleeding esophageal varices, successfully treated   with ethanolamine injection.    RECOMMENDATION:  Continue the octreotide infusion, followed medically.       ____________________________________     TOMAS CHILDRESS MD    WP / NTS    DD:  09/12/2017 12:26:35  DT:  09/12/2017 12:59:35    D#:  [id number]  Job#:  [id number]

## 2017-09-14 NOTE — OP REPORT
DATE OF SERVICE:  08/05/2017    PROCEDURE:  Upper endoscopy with esophageal variceal banding, and sclerosis   therapy.    INDICATION FOR PROCEDURE:  Upper gastrointestinal bleeding.    CONSENT:  Informed consent was obtained after benefits, risks and possible   alternatives were discussed.    MEDICATIONS:  The patient received 100 mcg of fentanyl and 5 mg of Versed   given throughout the procedure.      DICTATION ENDS HERE.       ____________________________________     TOMAS CHILDRESS MD    WP / NTS    DD:  09/14/2017 09:01:06  DT:  09/14/2017 09:19:02    D#:  [id number]  Job#:  [id number]

## (undated) DEVICE — CON SEDATION/>5 YR 1ST 15 MIN

## (undated) DEVICE — SYRINGE 6 CC 20 GA X 1 1/2 - NDL SAFETY  (50/BX)

## (undated) DEVICE — CONTAINER, SPECIMEN, STERILE

## (undated) DEVICE — FILM CASSETTE ENDO

## (undated) DEVICE — ELECTRODE 850 FOAM ADHESIVE - HYDROGEL RADIOTRNSPRNT (50/PK)

## (undated) DEVICE — BITEBLOCK ENDOSCOPIC PEDI. - (25/BX)

## (undated) DEVICE — FORCEP RADIAL JAW 4 STANDARD CAPACITY W/NEEDLE 240CM (40EA/BX)

## (undated) DEVICE — DEVICE HEMOSTATIC CLIPPING RESOLUTION 360 DEGREES (20EA/BX)

## (undated) DEVICE — SOD. CHL 10CC SYRINGE PREFILL - W/10 CC (30/BX)

## (undated) DEVICE — SPONGE GAUZE NON-STERILE 4X4 - (2000/CA 10PK/CA)

## (undated) DEVICE — BASIN EMESIS DISP. - (250/CA)

## (undated) DEVICE — SPEEDBAND SUPERVIEW SUPER 7 (4/BX)

## (undated) DEVICE — KIT CUSTOM PROCEDURE SINGLE FOR ENDO  (15/CA)

## (undated) DEVICE — SYRINGE DISP. 50CC LS - (40/BX)

## (undated) DEVICE — BITE BLOCK ADULT 60FR (100EA/CA)

## (undated) DEVICE — GOWN SURGEONS X-LARGE - DISP. (30/CA)

## (undated) DEVICE — CATHERTER CLEAR SINGLE USE INJECTION THERAPY NEEDLE 25GA X 4MM  2.3MM X 240CM (5EA/BX)

## (undated) DEVICE — CANISTER SUCTION RIGID RED 1500CC (40EA/CA)

## (undated) DEVICE — CON SEDATION EA ADDL 15 MIN

## (undated) DEVICE — SYRINGE 3 CC 22 GA X 1-1/2 - NDL SAFETY (50/BX 8BX/CA)

## (undated) DEVICE — TUBE CONNECTING SUCTION - CLEAR PLASTIC STERILE 72 IN (50EA/CA)

## (undated) DEVICE — GVL 4 STAT DISPOSABLE - (10/BX)